# Patient Record
Sex: FEMALE | Race: WHITE | NOT HISPANIC OR LATINO | Employment: STUDENT | URBAN - METROPOLITAN AREA
[De-identification: names, ages, dates, MRNs, and addresses within clinical notes are randomized per-mention and may not be internally consistent; named-entity substitution may affect disease eponyms.]

---

## 2016-10-10 LAB
HBV SURFACE AG SERPL QL IA: NEGATIVE
HIV 1+2 AB+HIV1 P24 AG SERPL QL IA: NON REACTIVE
RPR: NON REACTIVE
RUBELLA IMMUNE STATUS: NORMAL

## 2016-11-15 LAB
HBV SURFACE AG SERPL QL IA: NEGATIVE
HIV 1+2 AB+HIV1 P24 AG SERPL QL IA: NORMAL
RPR: NORMAL
RUBELLA IMMUNE STATUS: NORMAL

## 2017-01-12 ENCOUNTER — HOSPITAL ENCOUNTER (EMERGENCY)
Facility: HOSPITAL | Age: 37
Discharge: HOME | End: 2017-01-12
Attending: EMERGENCY MEDICINE
Payer: COMMERCIAL

## 2017-01-12 VITALS
SYSTOLIC BLOOD PRESSURE: 141 MMHG | WEIGHT: 158 LBS | DIASTOLIC BLOOD PRESSURE: 87 MMHG | TEMPERATURE: 99 F | OXYGEN SATURATION: 98 % | HEIGHT: 67 IN | BODY MASS INDEX: 24.8 KG/M2 | HEART RATE: 89 BPM | RESPIRATION RATE: 13 BRPM

## 2017-01-12 DIAGNOSIS — O20.0 THREATENED ABORTION IN EARLY PREGNANCY: Primary | ICD-10-CM

## 2017-01-12 DIAGNOSIS — O20.0 THREATENED ABORTION: ICD-10-CM

## 2017-01-12 LAB
ALBUMIN SERPL BCP-MCNC: 3.5 G/DL
ALP SERPL-CCNC: 48 U/L
ALT SERPL W/O P-5'-P-CCNC: 23 U/L
ANION GAP SERPL CALC-SCNC: 10 MMOL/L
AST SERPL-CCNC: 15 U/L
BASOPHILS # BLD AUTO: 0.02 K/UL
BASOPHILS NFR BLD: 0.2 %
BILIRUB SERPL-MCNC: 0.8 MG/DL
BUN SERPL-MCNC: 9 MG/DL
CALCIUM SERPL-MCNC: 8.9 MG/DL
CHLORIDE SERPL-SCNC: 106 MMOL/L
CO2 SERPL-SCNC: 20 MMOL/L
CREAT SERPL-MCNC: 0.6 MG/DL
DIFFERENTIAL METHOD: ABNORMAL
EOSINOPHIL # BLD AUTO: 0.1 K/UL
EOSINOPHIL NFR BLD: 0.5 %
ERYTHROCYTE [DISTWIDTH] IN BLOOD BY AUTOMATED COUNT: 12.7 %
EST. GFR  (AFRICAN AMERICAN): >60 ML/MIN/1.73 M^2
EST. GFR  (NON AFRICAN AMERICAN): >60 ML/MIN/1.73 M^2
GLUCOSE SERPL-MCNC: 97 MG/DL
HCG INTACT+B SERPL-ACNC: NORMAL MIU/ML
HCT VFR BLD AUTO: 40.5 %
HGB BLD-MCNC: 13.7 G/DL
LYMPHOCYTES # BLD AUTO: 1.3 K/UL
LYMPHOCYTES NFR BLD: 12.6 %
MCH RBC QN AUTO: 29.7 PG
MCHC RBC AUTO-ENTMCNC: 33.8 %
MCV RBC AUTO: 88 FL
MONOCYTES # BLD AUTO: 0.5 K/UL
MONOCYTES NFR BLD: 4.6 %
NEUTROPHILS # BLD AUTO: 8.2 K/UL
NEUTROPHILS NFR BLD: 81.6 %
PLATELET # BLD AUTO: 269 K/UL
PMV BLD AUTO: 9.8 FL
POTASSIUM SERPL-SCNC: 3.6 MMOL/L
PROT SERPL-MCNC: 6.5 G/DL
RBC # BLD AUTO: 4.61 M/UL
SODIUM SERPL-SCNC: 136 MMOL/L
WBC # BLD AUTO: 9.99 K/UL

## 2017-01-12 PROCEDURE — 85025 COMPLETE CBC W/AUTO DIFF WBC: CPT

## 2017-01-12 PROCEDURE — 99284 EMERGENCY DEPT VISIT MOD MDM: CPT | Mod: 25

## 2017-01-12 PROCEDURE — 96361 HYDRATE IV INFUSION ADD-ON: CPT

## 2017-01-12 PROCEDURE — 96360 HYDRATION IV INFUSION INIT: CPT

## 2017-01-12 PROCEDURE — 80053 COMPREHEN METABOLIC PANEL: CPT

## 2017-01-12 PROCEDURE — 84702 CHORIONIC GONADOTROPIN TEST: CPT

## 2017-01-12 PROCEDURE — 25000003 PHARM REV CODE 250: Performed by: EMERGENCY MEDICINE

## 2017-01-12 RX ORDER — SODIUM CHLORIDE 9 MG/ML
125 INJECTION, SOLUTION INTRAVENOUS CONTINUOUS
Status: DISCONTINUED | OUTPATIENT
Start: 2017-01-12 | End: 2017-01-12 | Stop reason: HOSPADM

## 2017-01-12 RX ADMIN — SODIUM CHLORIDE 1000 ML: 0.9 INJECTION, SOLUTION INTRAVENOUS at 07:01

## 2017-01-12 NOTE — ED PROVIDER NOTES
"Encounter Date: 2017       History     Chief Complaint   Patient presents with    Vaginal Bleeding     vaginal bleeding x 30 minutes. pt is approximately 8 weeks preg. pt is under the care of .      Review of patient's allergies indicates:  No Known Allergies  Patient is a 36 y.o. female presenting with the following complaint: female genitourinary complaint. The history is provided by the patient.   Female  Problem   Primary symptoms include vaginal bleeding.    Primary symptoms include no pelvic pain, no fever.   This is a new problem. Illness onset: about 30 minutes PTA. The problem has been unchanged. The symptoms occur spontaneously. She is pregnant (8 weeks). Pertinent negatives include no light-headedness. She has tried nothing for the symptoms. Associated medical issues include miscarriage.   Patient is currently seeing a fertility doctor.   Past Medical History   Diagnosis Date    Anxiety     Arthritis 10/2014     right hip    Elevated BP     Miscarriage      No past medical history pertinent negatives.  Past Surgical History   Procedure Laterality Date     section      Dilation and curettage of uterus  2014     Family History   Problem Relation Age of Onset    Breast cancer Neg Hx     Colon cancer Neg Hx     Ovarian cancer Neg Hx      Social History   Substance Use Topics    Smoking status: Never Smoker    Smokeless tobacco: Never Used    Alcohol use No     Review of Systems   Constitutional: Negative for fever.   Gastrointestinal:        "morning sickness"   Genitourinary: Positive for vaginal bleeding. Negative for flank pain and pelvic pain.   Musculoskeletal: Negative for back pain.   Neurological: Negative for light-headedness.   All other systems reviewed and are negative.      Physical Exam   Initial Vitals   BP Pulse Resp Temp SpO2   17 0602 17 0602 17 0602 17 0607 17 0607   148/104 108 16 98.7 °F (37.1 °C) 98 %     Physical " Exam    Nursing note and vitals reviewed.  Constitutional: No distress.   HENT:   Head: Normocephalic and atraumatic.   Eyes: EOM are normal.   Neck: Normal range of motion. Neck supple.   Cardiovascular: Normal rate, regular rhythm and normal heart sounds.   Pulmonary/Chest: Breath sounds normal.   Abdominal: Soft. She exhibits no distension. There is no tenderness.   Genitourinary:   Genitourinary Comments: No vulvar or vaginal lesions.  Vaginal vault with scant amount of blood.  Cervix is open to fingertip.   Musculoskeletal: Normal range of motion. She exhibits no edema.   Neurological: She is alert and oriented to person, place, and time.   Skin: Skin is warm and dry.   Psychiatric: Her behavior is normal. Thought content normal.         ED Course   Procedures  Labs Reviewed   CBC W/ AUTO DIFFERENTIAL - Abnormal; Notable for the following:        Result Value    Gran # 8.2 (*)     Gran% 81.6 (*)     Lymph% 12.6 (*)     All other components within normal limits   COMPREHENSIVE METABOLIC PANEL - Abnormal; Notable for the following:     CO2 20 (*)     Alkaline Phosphatase 48 (*)     All other components within normal limits   HCG, QUANTITATIVE, PREGNANCY     Imaging Results         US OB Less Than 14 Wks with Transvaginal (xpd) (Final result) Result time:  01/12/17 09:05:42    Procedure changed from US OB Less Than 14 Wks First Gestation        Final result by Nemo Cervantes MD (01/12/17 09:05:42)    Impression:        1. Single viable intrauterine pregnancy with an estimated age of 8 weeks and 2 days.         Electronically signed by: NEMO CERVANTES MD  Date:     01/12/17  Time:    09:05     Narrative:    US OB less than 14 weeks with transvaginal    History: Positive urine pregnancy test, last menstrual period 11/19/16.Vaginal bleeding    Comparison: None    Results: Transabdominal followed by a transvaginal ultrasound of the pelvis obtained.    There is a single viable intrauterine pregnancy.  A yolk  sac is also identified. The crown-rump length is 1.7cm.  It corresponds to an estimated gestational age of  8 weeks and 2 days .  The fetal heart rate is 164 bpm.  The right ovary measures 4.3 x 2.3 x 2.8 cm .Possible corpus luteal cyst measuring 3.0 x 2.5 cm.  The left ovary measures 3.2 x 1.9 x 2.6 cm .Corpus luteal cyst 1.8 x 1.9 cm.  There is arterial and venous flow within both ovaries.  No free fluid.                      Medical Decision Making:   Clinical Tests:   Lab Tests: Ordered and Reviewed  The following lab test(s) were unremarkable: CBC and CMP  Radiological Study: Ordered and Reviewed  ED Management:  36-year-old female with a threatened  at 8 weeks.  Ultrasound shows a viable IUP.  Case was discussed with Dr. [place], who agrees patient may be discharged home.  She is been advised to return if patient saturates a pad every 30 minutes to 1 hour and this continues struck the day.  She'll also return for any weakness, lightheadedness or shortness of breath.  She will follow-up with Dr. Alves as soon as able for recheck.                   ED Course     Clinical Impression:   The primary encounter diagnosis was Threatened  in early pregnancy. A diagnosis of Threatened  was also pertinent to this visit.          Rhett Coffman MD  [id number]

## 2017-01-12 NOTE — DISCHARGE INSTRUCTIONS
Possible Miscarriage (Threatened )  You may be having a miscarriage.  Common signs of a miscarriage are pain and bleeding. A small amount of bleeding can be normal during the first 3 months of pregnancy. Often the pain and bleeding stop, and you have a normal pregnancy and baby. But heavy bleeding or severe cramping can be an early sign of miscarriage. A miscarriage means an unexpected loss of your pregnancy.  At this time, we dont know whether you will have a miscarriage, or if things will clear up and your pregnancy will continue normally. We understand that this is emotionally difficult. There is little we can say to change the way you feel. But understand that miscarriages are common.  About 1 or 2 out of every 10 pregnancies end this way. Some end even before you know you are pregnant. This happens for a number of reasons, and usually we never figure out why. Its important you know that it is not your fault. It didnt happen because you did anything wrong.  Having sex or exercising does not cause a miscarriage. These activities are usually safe unless you have pain or bleeding or your doctor tells you to stop. Even minor falls wont cause a miscarriage. Miscarriages happen because things were not developing as they were supposed to. No medicine can prevent a miscarriage.  Again, understand that things are uncertain right now. You may still have some bleeding. This may be light spotting or like a period, and you may pass some tissue. You may have some cramping. This is why follow-up care is important.  Home care  To improve the chance of keeping your pregnancy, you should take these steps:  · Rest in bed until the pain and bleeding stop.  · Dont have sex until your health care provider says its OK.  · Use sanitary napkins instead of tampons.  · Dont douche.  · Dont take aspirin, ibuprofen, or naproxen.  · Dont have alcoholic or caffeinated beverage or smoke.  Follow-up care  Make an  appointment with your doctor within the next week, or as directed by our staff.  Note: If you had an ultrasound, a radiologist will review it. You will be told of any new findings that may affect your care.  Call 911  Call 911 if you have:  · Severe pain and very heavy bleeding  · Severe lightheadedness, passing out, or fainting  · Rapid heart rate  · Difficulty breathing  · Confusion or difficulty waking up  When to seek medical advice  Call your health care provider right away if any of these occur:  · Vaginal bleeding or pain that lasts for more than 3 days  · Heavy bleeding. This means soaking 1 new pad an hour over 3 hours.  · Fever of 100.4°F (38°C) or higher, or as directed by your health care provider  · Pain in your lower belly (abdomen) that gets worse  · Weakness or dizziness  · Passage of anything that resembles tissue. This would be pink or grayish membrane or solid material. Save the tissue in a clean container and bring it to your provider.  © 1096-6707 [employer]. [address] [place] [address], [place] [address]. All rights reserved. This information is not intended as a substitute for professional medical care. Always follow your healthcare professional's instructions.

## 2017-01-12 NOTE — ED NOTES
Pt felt heavy vaginal bleeding so got up to sit on the bedside commode. Passed 2 large blood clots. Clots placed in specimen cup. Dr. Coffman informed

## 2017-01-12 NOTE — ED AVS SNAPSHOT
[facility]  [address]  [place] [address]               Crystal Hurley   2017  6:41 AM   ED    Description:  Female : 1980   Department:  [facility]           Your Care was Coordinated By:     Provider Role From To    Rhett Coffman MD Attending Provider 17 0710 --      Reason for Visit     Vaginal Bleeding           Diagnoses this Visit        Comments    Threatened  in early pregnancy    -  Primary     Threatened            ED Disposition     ED Disposition Condition Comment    Discharge             To Do List           Follow-up Information     Schedule an appointment as soon as possible for a visit with Tiffany Alves MD.    Specialties:  Obstetrics, Obstetrics and Gynecology    Contact information:    [address] [place] [address]  [phone]        [facility]     [facility] Nurse Care Line -  Assistance  Registered nurses in the [facility] provide clinical advisement, health education, appointment booking, and other advisory services.  Call for this free service at [phone].             Medications           Message regarding Medications     Verify the changes and/or additions to your medication regime listed below are the same as discussed with your clinician today.  If any of these changes or additions are incorrect, please notify your healthcare provider.        These medications were administered today        Dose Freq    sodium chloride 0.9% bolus 1,000 mL 1,000 mL ED 1 Time    Sig: Inject 1,000 mLs into the vein ED 1 Time.    Class: Normal    Route: Intravenous    0.9%  NaCl infusion 125 mL/hr Continuous    Sig: Inject 125 mL/hr into the vein continuous.    Class: Normal    Route: Intravenous           Verify that the below list of medications is an accurate representation of the medications you are currently taking.  If none reported, the list may be  "blank. If incorrect, please contact your healthcare provider. Carry this list with you in case of emergency.           Current Medications     0.9%  NaCl infusion Inject 125 mL/hr into the vein continuous.    folic acid (FOLVITE) 1 MG tablet Take 1 tablet (1 mg total) by mouth once daily.    ibuprofen (ADVIL,MOTRIN) 100 mg/5 mL suspension Take by mouth every 6 (six) hours as needed for Temperature greater than.    prenatal vit no.91-iron-FA-dha (PRENATAL + DHA) 28 mg iron- 975 mcg-200 mg Cmpk Take by mouth.    ranitidine (ZANTAC) 150 MG capsule Take 150 mg by mouth 2 (two) times daily as needed for Heartburn.           Clinical Reference Information           Your Vitals Were     BP Pulse Temp Resp Height Weight    146/84 84 98.7 °F (37.1 °C) (Oral) 13 5' 7" (1.702 m) 71.7 kg (158 lb)    SpO2 BMI             98% 24.75 kg/m2         Allergies as of 2017     No Known Allergies      Immunizations Administered on Date of Encounter - 2017     None      ED Micro, Lab, POCT     Start Ordered       Status Ordering Provider    17 0619  CBC W/ AUTO DIFFERENTIAL  STAT      Final result     17 0620 17 0619  Comp. Metabolic Panel  STAT      Final result     17 0620 17 0619  hCG, quantitative  STAT      Final result     17 0620 17 0619    Once,   Status:  Canceled      Canceled       ED Imaging Orders     Start Ordered       Status Ordering Provider    17 0809 17 0620  US OB Less Than 14 Wks with Transvaginal (xpd)  1 time imaging      Final result     17 0621 17 0620    1 time imaging,   Status:  Canceled      Canceled         Discharge Instructions         Possible Miscarriage (Threatened )  You may be having a miscarriage.  Common signs of a miscarriage are pain and bleeding. A small amount of bleeding can be normal during the first 3 months of pregnancy. Often the pain and bleeding stop, and you have a normal pregnancy and " baby. But heavy bleeding or severe cramping can be an early sign of miscarriage. A miscarriage means an unexpected loss of your pregnancy.  At this time, we dont know whether you will have a miscarriage, or if things will clear up and your pregnancy will continue normally. We understand that this is emotionally difficult. There is little we can say to change the way you feel. But understand that miscarriages are common.  About 1 or 2 out of every 10 pregnancies end this way. Some end even before you know you are pregnant. This happens for a number of reasons, and usually we never figure out why. Its important you know that it is not your fault. It didnt happen because you did anything wrong.  Having sex or exercising does not cause a miscarriage. These activities are usually safe unless you have pain or bleeding or your doctor tells you to stop. Even minor falls wont cause a miscarriage. Miscarriages happen because things were not developing as they were supposed to. No medicine can prevent a miscarriage.  Again, understand that things are uncertain right now. You may still have some bleeding. This may be light spotting or like a period, and you may pass some tissue. You may have some cramping. This is why follow-up care is important.  Home care  To improve the chance of keeping your pregnancy, you should take these steps:  · Rest in bed until the pain and bleeding stop.  · Dont have sex until your health care provider says its OK.  · Use sanitary napkins instead of tampons.  · Dont douche.  · Dont take aspirin, ibuprofen, or naproxen.  · Dont have alcoholic or caffeinated beverage or smoke.  Follow-up care  Make an appointment with your doctor within the next week, or as directed by our staff.  Note: If you had an ultrasound, a radiologist will review it. You will be told of any new findings that may affect your care.  Call 911  Call 911 if you have:  · Severe pain and very heavy bleeding  · Severe  lightheadedness, passing out, or fainting  · Rapid heart rate  · Difficulty breathing  · Confusion or difficulty waking up  When to seek medical advice  Call your health care provider right away if any of these occur:  · Vaginal bleeding or pain that lasts for more than 3 days  · Heavy bleeding. This means soaking 1 new pad an hour over 3 hours.  · Fever of 100.4°F (38°C) or higher, or as directed by your health care provider  · Pain in your lower belly (abdomen) that gets worse  · Weakness or dizziness  · Passage of anything that resembles tissue. This would be pink or grayish membrane or solid material. Save the tissue in a clean container and bring it to your provider.  © 4791-2907 [employer]. [address] [place] [address], [place] [address]. All rights reserved. This information is not intended as a substitute for professional medical care. Always follow your healthcare professional's instructions.           [facility] complies with applicable Federal civil rights laws and does not discriminate on the basis of race, color, national origin, age, disability, or sex.        Language Assistance Services     ATTENTION: Language assistance services are available, free of charge. Please call [phone].      ATENCIÓN: Si habla español, tiene a matias disposición servicios gratuitos de asistencia lingüística. Llame al [phone].     DANIELLE Ý: N?u b?n nói Ti?ng Vi?t, có các d?ch v? h? tr? ngôn ng? mi?n phí dành cho b?n. G?i s? [phone].

## 2017-01-12 NOTE — ED NOTES
Pt c/o heavy vaginal bleeding this morning. States she is 8 weeks pregnant, and had a normal ultrasound on Monday with her physician. Denies any recent abdominal pain. Reports history of 2 miscarriages in the past few years. Pt denies feeling weak or lightheaded.

## 2017-01-21 ENCOUNTER — HOSPITAL ENCOUNTER (EMERGENCY)
Facility: HOSPITAL | Age: 37
Discharge: HOME | End: 2017-01-21
Attending: EMERGENCY MEDICINE
Payer: COMMERCIAL

## 2017-01-21 VITALS
SYSTOLIC BLOOD PRESSURE: 150 MMHG | HEIGHT: 67 IN | TEMPERATURE: 98 F | RESPIRATION RATE: 18 BRPM | WEIGHT: 158 LBS | OXYGEN SATURATION: 98 % | BODY MASS INDEX: 24.8 KG/M2 | DIASTOLIC BLOOD PRESSURE: 69 MMHG | HEART RATE: 89 BPM

## 2017-01-21 DIAGNOSIS — O20.0 THREATENED MISCARRIAGE: Primary | ICD-10-CM

## 2017-01-21 LAB
ABO + RH BLD: NORMAL
ALBUMIN SERPL BCP-MCNC: 3.1 G/DL
ALP SERPL-CCNC: 45 U/L
ALT SERPL W/O P-5'-P-CCNC: 23 U/L
ANION GAP SERPL CALC-SCNC: 7 MMOL/L
AST SERPL-CCNC: 13 U/L
BACTERIA #/AREA URNS HPF: ABNORMAL /HPF
BASOPHILS # BLD AUTO: 0.02 K/UL
BASOPHILS NFR BLD: 0.3 %
BILIRUB SERPL-MCNC: 0.8 MG/DL
BILIRUB UR QL STRIP: NEGATIVE
BUN SERPL-MCNC: 7 MG/DL
CALCIUM SERPL-MCNC: 8 MG/DL
CHLORIDE SERPL-SCNC: 109 MMOL/L
CLARITY UR: ABNORMAL
CO2 SERPL-SCNC: 19 MMOL/L
COLOR UR: ABNORMAL
CREAT SERPL-MCNC: 0.6 MG/DL
DIFFERENTIAL METHOD: ABNORMAL
EOSINOPHIL # BLD AUTO: 0.1 K/UL
EOSINOPHIL NFR BLD: 1.2 %
ERYTHROCYTE [DISTWIDTH] IN BLOOD BY AUTOMATED COUNT: 12.8 %
EST. GFR  (AFRICAN AMERICAN): >60 ML/MIN/1.73 M^2
EST. GFR  (NON AFRICAN AMERICAN): >60 ML/MIN/1.73 M^2
GLUCOSE SERPL-MCNC: 96 MG/DL
GLUCOSE UR QL STRIP: NEGATIVE
HCG INTACT+B SERPL-ACNC: NORMAL MIU/ML
HCT VFR BLD AUTO: 38.2 %
HGB BLD-MCNC: 13 G/DL
HGB UR QL STRIP: ABNORMAL
HYALINE CASTS #/AREA URNS LPF: 0 /LPF
KETONES UR QL STRIP: NEGATIVE
LEUKOCYTE ESTERASE UR QL STRIP: ABNORMAL
LYMPHOCYTES # BLD AUTO: 1.2 K/UL
LYMPHOCYTES NFR BLD: 15.4 %
MCH RBC QN AUTO: 29.8 PG
MCHC RBC AUTO-ENTMCNC: 34 %
MCV RBC AUTO: 88 FL
MICROSCOPIC COMMENT: ABNORMAL
MONOCYTES # BLD AUTO: 0.5 K/UL
MONOCYTES NFR BLD: 5.8 %
NEUTROPHILS # BLD AUTO: 6 K/UL
NEUTROPHILS NFR BLD: 76.8 %
NITRITE UR QL STRIP: NEGATIVE
PH UR STRIP: >8 [PH] (ref 5–8)
PLATELET # BLD AUTO: 235 K/UL
PMV BLD AUTO: 10 FL
POTASSIUM SERPL-SCNC: 3.5 MMOL/L
PROT SERPL-MCNC: 5.8 G/DL
PROT UR QL STRIP: ABNORMAL
RBC # BLD AUTO: 4.36 M/UL
RBC #/AREA URNS HPF: >100 /HPF (ref 0–4)
SODIUM SERPL-SCNC: 135 MMOL/L
SP GR UR STRIP: 1.01 (ref 1–1.03)
URN SPEC COLLECT METH UR: ABNORMAL
UROBILINOGEN UR STRIP-ACNC: NEGATIVE EU/DL
WBC # BLD AUTO: 7.75 K/UL
WBC #/AREA URNS HPF: 5 /HPF (ref 0–5)

## 2017-01-21 PROCEDURE — 80053 COMPREHEN METABOLIC PANEL: CPT

## 2017-01-21 PROCEDURE — 84702 CHORIONIC GONADOTROPIN TEST: CPT

## 2017-01-21 PROCEDURE — 86901 BLOOD TYPING SEROLOGIC RH(D): CPT

## 2017-01-21 PROCEDURE — 86900 BLOOD TYPING SEROLOGIC ABO: CPT

## 2017-01-21 PROCEDURE — 85025 COMPLETE CBC W/AUTO DIFF WBC: CPT

## 2017-01-21 PROCEDURE — 81000 URINALYSIS NONAUTO W/SCOPE: CPT

## 2017-01-21 PROCEDURE — 99285 EMERGENCY DEPT VISIT HI MDM: CPT

## 2017-01-21 NOTE — ED AVS SNAPSHOT
[facility]  [address]  [place] [address]               Crystal Hurley   2017  6:30 AM   ED    Description:  Female : 1980   Department:  [facility]           Your Care was Coordinated By:     Provider Role From To    Khris Macias MD Attending Provider 17 0715 17 1210      Reason for Visit     Vaginal Bleeding           Diagnoses this Visit        Comments    Threatened miscarriage    -  Primary       ED Disposition     ED Disposition Condition Comment    Discharge             To Do List           Follow-up Information     Follow up with Dusty Navarro MD.    Specialty:  Family Medicine    Contact information:    [address]  [address]  [place] [address]  [phone]        [facility]     [facility] Care [facility] -  Assistance  Registered nurses in the [facility] provide clinical advisement, health education, appointment booking, and other advisory services.  Call for this free service at [phone].             Medications           Message regarding Medications     Verify the changes and/or additions to your medication regime listed below are the same as discussed with your clinician today.  If any of these changes or additions are incorrect, please notify your healthcare provider.             Verify that the below list of medications is an accurate representation of the medications you are currently taking.  If none reported, the list may be blank. If incorrect, please contact your healthcare provider. Carry this list with you in case of emergency.           Current Medications     folic acid (FOLVITE) 1 MG tablet Take 1 tablet (1 mg total) by mouth once daily.    ibuprofen (ADVIL,MOTRIN) 100 mg/5 mL suspension Take by mouth every 6 (six) hours as needed for Temperature greater than.    prenatal vit no.91-iron-FA-dha (PRENATAL + DHA) 28 mg iron- 975 mcg-200 mg Cmpk Take by mouth.  "   ranitidine (ZANTAC) 150 MG capsule Take 150 mg by mouth 2 (two) times daily as needed for Heartburn.           Clinical Reference Information           Your Vitals Were     BP Pulse Temp Resp Height Weight    150/69 89 98.2 °F (36.8 °C) (Oral) 18 5' 7" (1.702 m) 71.7 kg (158 lb)    SpO2 BMI             98% 24.75 kg/m2         Allergies as of 2017     No Known Allergies      Immunizations Administered on Date of Encounter - 2017     None      ED Micro, Lab, POCT     Start Ordered       Status Ordering Provider    17 0641 17 0641  CBC W/ AUTO DIFFERENTIAL  STAT      Final result     17 0641 17 0641  Comp. Metabolic Panel  STAT      Final result     17 0641 17 0641  hCG, quantitative  STAT      Final result     17 0641 17 0641  POCT urine pregnancy  Once      Acknowledged     17 0641 17 0641  Urinalysis  STAT      Final result     17 0641 17 0641  Urinalysis Microscopic  Once      Final result       ED Imaging Orders     Start Ordered       Status Ordering Provider    17 0856 17 0855  US OB Less Than 14 Wks with Transvag(xpd  1 time imaging      Final result         Discharge Instructions         Please follow-up with Dr. Pak or Dr. Alves for further evaluation and management of your pregnancy.  Refer to the additional material provided for further information.    Possible Miscarriage (Threatened )  You may be having a miscarriage.  Common signs of a miscarriage are pain and bleeding. A small amount of bleeding can be normal during the first 3 months of pregnancy. Often the pain and bleeding stop, and you have a normal pregnancy and baby. But heavy bleeding or severe cramping can be an early sign of miscarriage. A miscarriage means an unexpected loss of your pregnancy.  At this time, we dont know whether you will have a miscarriage, or if things will clear up and your pregnancy will continue normally. We " understand that this is emotionally difficult. There is little we can say to change the way you feel. But understand that miscarriages are common.  About 1 or 2 out of every 10 pregnancies end this way. Some end even before you know you are pregnant. This happens for a number of reasons, and usually we never figure out why. Its important you know that it is not your fault. It didnt happen because you did anything wrong.  Having sex or exercising does not cause a miscarriage. These activities are usually safe unless you have pain or bleeding or your doctor tells you to stop. Even minor falls wont cause a miscarriage. Miscarriages happen because things were not developing as they were supposed to. No medicine can prevent a miscarriage.  Again, understand that things are uncertain right now. You may still have some bleeding. This may be light spotting or like a period, and you may pass some tissue. You may have some cramping. This is why follow-up care is important.  Home care  To improve the chance of keeping your pregnancy, you should take these steps:  · Rest in bed until the pain and bleeding stop.  · Dont have sex until your health care provider says its OK.  · Use sanitary napkins instead of tampons.  · Dont douche.  · Dont take aspirin, ibuprofen, or naproxen.  · Dont have alcoholic or caffeinated beverage or smoke.  Follow-up care  Make an appointment with your doctor within the next week, or as directed by our staff.  Note: If you had an ultrasound, a radiologist will review it. You will be told of any new findings that may affect your care.  Call 911  Call 911 if you have:  · Severe pain and very heavy bleeding  · Severe lightheadedness, passing out, or fainting  · Rapid heart rate  · Difficulty breathing  · Confusion or difficulty waking up  When to seek medical advice  Call your health care provider right away if any of these occur:  · Vaginal bleeding or pain that lasts for more than 3  days  · Heavy bleeding. This means soaking 1 new pad an hour over 3 hours.  · Fever of 100.4°F (38°C) or higher, or as directed by your health care provider  · Pain in your lower belly (abdomen) that gets worse  · Weakness or dizziness  · Passage of anything that resembles tissue. This would be pink or grayish membrane or solid material. Save the tissue in a clean container and bring it to your provider.  © 3720-1596 [employer]. [address] [place] [address], [place] [address]. All rights reserved. This information is not intended as a substitute for professional medical care. Always follow your healthcare professional's instructions.           [facility] complies with applicable Federal civil rights laws and does not discriminate on the basis of race, color, national origin, age, disability, or sex.        Language Assistance Services     ATTENTION: Language assistance services are available, free of charge. Please call [phone].      ATENCIÓN: Si habla gem, tiene a matias disposición servicios gratuitos de asistencia lingüística. Llame al [phone].     CHÚ Ý: N?u b?n nói Ti?ng Vi?t, có các d?ch v? h? tr? ngôn ng? mi?n phí dành cho b?n. G?i s? [phone].

## 2017-01-21 NOTE — ED NOTES
Pt states she is 9 weeks pregnant.  States she just stood up and started having vaginal bleeding. States she was here last week for same complaint.

## 2017-01-21 NOTE — ED NOTES
Patient identifiers for Crystal Hurley checked and correct.  LOC: The patient is awake, alert and aware of environment with an appropriate affect, the patient is oriented x 3 and speaking appropriately.  APPEARANCE: Anxious. Tearful.  Patient uncomfortable.  SKIN: The skin is warm and dry, patient has normal skin turgor and moist mucus membranes, skin intact, no breakdown or brusing noted.  MUSKULOSKELETAL: Patient moving all extremities well, no obvious swelling or deformities noted.  RESPIRATORY: Airway is open and patent, respirations are spontaneous, patient has a normal effort and rate.  ABDOMEN: Soft and  tender to palpation, no distention noted.

## 2017-01-21 NOTE — ED PROVIDER NOTES
Encounter Date: 2017       History     Chief Complaint   Patient presents with    Vaginal Bleeding     intermittent vag bleeding x 1 week. pt was seen here last week for same complaint     Review of patient's allergies indicates:  No Known Allergies  HPI Comments: CHIEF COMPLAINT: Vaginal bleeding    HISTORY OF PRESENT ILLNESS: This is a 36-year-old female who presents to the emergency Department today with vaginal bleeding.  She is 10 weeks pregnant.  She is seen by Dr. Pak at the fertility clinic and Dr. Alves with OB/GYN.  She started with some vaginal spotting last week.  She had an ultrasound at that time which showed good heart rate.  Yesterday and today she started to develop a little bit more bleeding.  She is having no pain.  No vaginal discharge.  No lightheadedness or dizziness.  No palpitations.      REVIEW OF SYSTEMS:  Constitutional: No fever, no chills.  Eyes: No discharge. No pain.  HENT: No nasal drainage. No ear ache. No sore throat.  Cardiovascular: No chest pain, no palpitations.  Respiratory: No cough, no shortness of breath.  Gastrointestinal: No vomiting.  No diarrhea.  Genitourinary: No hematuria, dysuria, urgency.  Musculoskeletal: No back pain.  Skin: No rashes, no lesions.  Neurological: No headache, no focal weakness.    ALLERGIES reviewed  Family history reviewed  Home medications reviewed  Problem list reviewed        The history is provided by the patient.     Past Medical History   Diagnosis Date    Anxiety     Arthritis 10/2014     right hip    Elevated BP     Miscarriage      No past medical history pertinent negatives.  Past Surgical History   Procedure Laterality Date     section      Dilation and curettage of uterus  2014     Family History   Problem Relation Age of Onset    Breast cancer Neg Hx     Colon cancer Neg Hx     Ovarian cancer Neg Hx      Social History   Substance Use Topics    Smoking status: Never Smoker    Smokeless tobacco: Never  Used    Alcohol use No     Review of Systems   All other systems reviewed and are negative.      Physical Exam   Initial Vitals   BP Pulse Resp Temp SpO2   01/21/17 0631 01/21/17 0631 01/21/17 0631 01/21/17 0631 01/21/17 0631   155/92 93 18 98.2 °F (36.8 °C) 100 %     Physical Exam    Nursing note and vitals reviewed.  Constitutional: She appears well-developed and well-nourished.   HENT:   Head: Normocephalic and atraumatic.   Nose: Nose normal.   Mouth/Throat: Oropharynx is clear and moist.   Eyes: Conjunctivae and EOM are normal. Pupils are equal, round, and reactive to light. No scleral icterus.   Neck: Normal range of motion. Neck supple. No JVD present.   Cardiovascular: Normal rate, regular rhythm, normal heart sounds and intact distal pulses. Exam reveals no gallop and no friction rub.    No murmur heard.  Pulmonary/Chest: Breath sounds normal. No stridor. No respiratory distress. She has no wheezes. She exhibits no tenderness.   Abdominal: Soft. Bowel sounds are normal. She exhibits no distension and no mass. There is no tenderness. There is no rebound and no guarding.   Musculoskeletal: Normal range of motion. She exhibits no edema or tenderness.   Neurological: She is alert and oriented to person, place, and time. She has normal strength. No cranial nerve deficit.   Skin: Skin is warm and dry. No rash noted. No pallor.   Psychiatric: She has a normal mood and affect. Thought content normal.         ED Course   Procedures  Labs Reviewed   CBC W/ AUTO DIFFERENTIAL - Abnormal; Notable for the following:        Result Value    Gran% 76.8 (*)     Lymph% 15.4 (*)     All other components within normal limits   COMPREHENSIVE METABOLIC PANEL - Abnormal; Notable for the following:     Sodium 135 (*)     CO2 19 (*)     Calcium 8.0 (*)     Total Protein 5.8 (*)     Albumin 3.1 (*)     Alkaline Phosphatase 45 (*)     Anion Gap 7 (*)     All other components within normal limits   URINALYSIS - Abnormal; Notable for  the following:     Color, UA Red (*)     Appearance, UA Cloudy (*)     pH, UA >8.0 (*)     Protein, UA 2+ (*)     Occult Blood UA 3+ (*)     Leukocytes, UA Trace (*)     All other components within normal limits   URINALYSIS MICROSCOPIC - Abnormal; Notable for the following:     RBC, UA >100 (*)     Bacteria, UA Few (*)     All other components within normal limits   HCG, QUANTITATIVE, PREGNANCY   POCT URINE PREGNANCY   GROUP & RH             Medical Decision Making:   Differential Diagnosis:   Ectopic pregnancy, placental abruption, placenta previa, ruptured ovarian cyst, ovarian torsion, infection, foreign body, coagulation disorder, neoplasm, menstruation, drug effect.  Clinical Tests:   Lab Tests: Ordered and Reviewed  Radiological Study: Ordered and Reviewed  ED Management:  This is a 36-year-old who presents emergency Department today with vaginal bleeding.  She has a previous ultrasound which showed an appropriate heart rate.  Given her increased bleeding and have obtained a ultrasound today.  We do see appropriate heartbeat.  There is a small subchorionic hemorrhage seen today.  She will need to follow up with her OB/GYN for continued management of this threatened miscarriage.  I have discussed this at length with the patient she understands and will comply. After taking into careful account the historical factors and physical exam findings of the patient's presentation today, in conjunction with the empirical and objective data obtained on ED workup, no acute emergent medical condition has been identified. The patient appears to be low risk for an emergent medical condition and I feel it is safe and appropriate at this time for the patient to be discharged to follow-up as detailed in their discharge instructions for reevaluation and possible continued outpatient workup and management. I have discussed the specifics of the workup with the patient and the patient has verbalized understanding of the details of  the workup, the diagnosis, the treatment plan, and the need for outpatient follow-up. In addition, I have advised the patient that they can return to the ED and/or activate EMS at any time with worsening of their symptoms, change of their symptoms, or with any other medical complaint. The patient remained comfortable and stable during their visit in the ED.  Discharge and follow-up instructions discussed with the patient who expressed understanding and willingness to comply with my recommendations.     This medical record was prepared using voice dictation software. There may be phonetic errors.                    ED Course    I have discussed the results the patient's workup with the patient.    Past medical records reviewed    Clinical Impression:   The encounter diagnosis was Threatened miscarriage.    Disposition:   Disposition: Discharged  Condition: Stable       Khris Macias MD  01/21/17 [id number]

## 2017-01-21 NOTE — ED NOTES
Dr. Macias at bedside for re-eval of pt. Pt updated on plan of care and verbalized understanding of further instructions.

## 2017-01-21 NOTE — ED NOTES
Assisted pt to bedside commode. Pt stated she felt weak, transferred with assistance. Tolerated well. NAD noted. Will cont to monitor.

## 2017-01-21 NOTE — ED NOTES
Bedside report with PAULINE Garcia. Care assumed. Pt lying awake in bed, just returned from ultrasound. No acute distress noted.  at bedside. Pt has no other complaints at this time. Side rails up x 2, call light within reach. Will continue to monitor.

## 2017-01-21 NOTE — DISCHARGE INSTRUCTIONS
Please follow-up with Dr. Pak or Dr. Alves for further evaluation and management of your pregnancy.  Refer to the additional material provided for further information.    Possible Miscarriage (Threatened )  You may be having a miscarriage.  Common signs of a miscarriage are pain and bleeding. A small amount of bleeding can be normal during the first 3 months of pregnancy. Often the pain and bleeding stop, and you have a normal pregnancy and baby. But heavy bleeding or severe cramping can be an early sign of miscarriage. A miscarriage means an unexpected loss of your pregnancy.  At this time, we dont know whether you will have a miscarriage, or if things will clear up and your pregnancy will continue normally. We understand that this is emotionally difficult. There is little we can say to change the way you feel. But understand that miscarriages are common.  About 1 or 2 out of every 10 pregnancies end this way. Some end even before you know you are pregnant. This happens for a number of reasons, and usually we never figure out why. Its important you know that it is not your fault. It didnt happen because you did anything wrong.  Having sex or exercising does not cause a miscarriage. These activities are usually safe unless you have pain or bleeding or your doctor tells you to stop. Even minor falls wont cause a miscarriage. Miscarriages happen because things were not developing as they were supposed to. No medicine can prevent a miscarriage.  Again, understand that things are uncertain right now. You may still have some bleeding. This may be light spotting or like a period, and you may pass some tissue. You may have some cramping. This is why follow-up care is important.  Home care  To improve the chance of keeping your pregnancy, you should take these steps:  · Rest in bed until the pain and bleeding stop.  · Dont have sex until your health care provider says its OK.  · Use sanitary napkins  instead of tampons.  · Dont douche.  · Dont take aspirin, ibuprofen, or naproxen.  · Dont have alcoholic or caffeinated beverage or smoke.  Follow-up care  Make an appointment with your doctor within the next week, or as directed by our staff.  Note: If you had an ultrasound, a radiologist will review it. You will be told of any new findings that may affect your care.  Call 911  Call 911 if you have:  · Severe pain and very heavy bleeding  · Severe lightheadedness, passing out, or fainting  · Rapid heart rate  · Difficulty breathing  · Confusion or difficulty waking up  When to seek medical advice  Call your health care provider right away if any of these occur:  · Vaginal bleeding or pain that lasts for more than 3 days  · Heavy bleeding. This means soaking 1 new pad an hour over 3 hours.  · Fever of 100.4°F (38°C) or higher, or as directed by your health care provider  · Pain in your lower belly (abdomen) that gets worse  · Weakness or dizziness  · Passage of anything that resembles tissue. This would be pink or grayish membrane or solid material. Save the tissue in a clean container and bring it to your provider.  © 4125-7443 [employer]. [address] [place] [address], [place] [address]. All rights reserved. This information is not intended as a substitute for professional medical care. Always follow your healthcare professional's instructions.

## 2017-01-23 ENCOUNTER — TELEPHONE (OUTPATIENT)
Dept: OBSTETRICS AND GYNECOLOGY | Facility: CLINIC | Age: 37
End: 2017-01-23

## 2017-01-23 DIAGNOSIS — O20.9: Primary | ICD-10-CM

## 2017-01-23 NOTE — TELEPHONE ENCOUNTER
Patient was seen in the ED on 1/12/17 and 1/21/17 for bleeding.  Ultrasound done in ED showed a 10w 0d IUP.  There is a subchorionic hemorrhage w/ a small blood clot that measures 1.5cm.    Patient states she is being released from  but she wants the patient to be seen two weeks from today.  Patient states when she was seen by  this morning she did another ultrasound and the blood clot was much smaller and moved away from the baby.   Patient states  is suppose to be sending her records to  and was not sure if  is recommending an ultrasound with her visit when she sees  in two weeks.   Notified I will discuss with  and get back with her on scheduling.  Patient verbalized understanding.    Please advise.

## 2017-01-23 NOTE — TELEPHONE ENCOUNTER
----- Message from Andreia Quiroga sent at 1/23/2017 10:56 AM CST -----  Contact: Self [phone]  Patient is calling to schedule a 2 week follow up and the next available is until 2/16/17 and she would like something sooner. Please advice

## 2017-02-06 ENCOUNTER — ROUTINE PRENATAL (OUTPATIENT)
Dept: OBSTETRICS AND GYNECOLOGY | Facility: CLINIC | Age: 37
End: 2017-02-06
Payer: COMMERCIAL

## 2017-02-06 ENCOUNTER — OFFICE VISIT (OUTPATIENT)
Dept: OBSTETRICS AND GYNECOLOGY | Facility: CLINIC | Age: 37
End: 2017-02-06
Payer: COMMERCIAL

## 2017-02-06 VITALS
BODY MASS INDEX: 24.62 KG/M2 | WEIGHT: 157.19 LBS | SYSTOLIC BLOOD PRESSURE: 144 MMHG | DIASTOLIC BLOOD PRESSURE: 86 MMHG

## 2017-02-06 DIAGNOSIS — O16.1 HYPERTENSION AFFECTING PREGNANCY IN FIRST TRIMESTER: ICD-10-CM

## 2017-02-06 DIAGNOSIS — Z34.81 ENCOUNTER FOR SUPERVISION OF OTHER NORMAL PREGNANCY IN FIRST TRIMESTER: ICD-10-CM

## 2017-02-06 DIAGNOSIS — Z98.891 HISTORY OF C-SECTION: ICD-10-CM

## 2017-02-06 DIAGNOSIS — Z87.51 HISTORY OF PRETERM DELIVERY: ICD-10-CM

## 2017-02-06 DIAGNOSIS — O20.9: ICD-10-CM

## 2017-02-06 PROBLEM — Z34.91 ENCOUNTER FOR SUPERVISION OF NORMAL PREGNANCY IN FIRST TRIMESTER: Status: ACTIVE | Noted: 2017-02-06

## 2017-02-06 PROCEDURE — 87480 CANDIDA DNA DIR PROBE: CPT

## 2017-02-06 PROCEDURE — 76801 OB US < 14 WKS SINGLE FETUS: CPT | Mod: S$GLB,,, | Performed by: OBSTETRICS & GYNECOLOGY

## 2017-02-06 PROCEDURE — 0500F INITIAL PRENATAL CARE VISIT: CPT | Mod: S$GLB,,, | Performed by: OBSTETRICS & GYNECOLOGY

## 2017-02-06 PROCEDURE — 87086 URINE CULTURE/COLONY COUNT: CPT

## 2017-02-06 PROCEDURE — 99999 PR PBB SHADOW E&M-EST. PATIENT-LVL II: CPT | Mod: PBBFAC,,, | Performed by: OBSTETRICS & GYNECOLOGY

## 2017-02-06 RX ORDER — DOXYLAMINE SUCCINATE AND PYRIDOXINE HYDROCHLORIDE 10; 10 MG/1; MG/1
TABLET, DELAYED RELEASE ORAL
COMMUNITY
Start: 2017-01-31 | End: 2017-03-06 | Stop reason: SDUPTHER

## 2017-02-06 NOTE — PROGRESS NOTES
OBSTETRIC HISTORY:   OB History      Para Term  AB TAB SAB Ectopic Multiple Living    4 1 0 1 2  2   1         COMPREHENSIVE GYN HISTORY:  PAP History: Denies abnormal Paps.  Infection History: Denies STDs. Denies PID.  Benign History: Denies uterine fibroids. Denies ovarian cysts. Denies endometriosis.   Cancer History: Denies cervical cancer. Denies uterine cancer or hyperplasia. Denies ovarian cancer. Denies vulvar cancer or pre-cancer. Denies vaginal cancer or pre-cancer. Denies breast cancer. Denies colon cancer.  Sexual Activity History:  has no sexual activity history on file.   Menstrual History: Age of menarche: 13 years. Every 28 days, flows for 5 days. Moderate flow.  Dysmenorrhea History: Reports severe dysmenorrhea.  Contraception History: None      HPI:   36 y.o.  Patient's last menstrual period was 2016 (approximate).   Patient is  here for pregnancy conceived by IUI. All OB labs have been done as well as Materni T 21(per patient normal) She denies bladder, breast and bowel complaints.    ROS:  GENERAL: Denies weight gain or weight loss. Feeling well overall.   SKIN: Denies rash or lesions.   HEAD: Denies headache.   NODES: Denies enlarged lymph nodes.   CHEST: Denies shortness of breath.   ABDOMEN: No abdominal pain, constipation, diarrhea, nausea, vomiting or rectal bleeding.   URINARY: No frequency, dysuria, hematuria, or burning on urination.  REPRODUCTIVE: See HPI.   BREASTS: The patient denies pain, lumps, or nipple discharge.   HEMATOLOGIC: No easy bruisability.   MUSCULOSKELETAL: Denies joint pain or back pain.   NEUROLOGIC: Denies weakness.   PSYCHIATRIC: Denies depression, anxiety or mood swings.    PE:   Visit Vitals    BP (!) 144/86    Wt 71.3 kg (157 lb 3 oz)    LMP 2016 (Approximate)    BMI 24.62 kg/m2     APPEARANCE: Well nourished, well developed, in no acute distress.  ABDOMEN: Soft. No tenderness or masses. No hernias.  PELVIC:   VULVA: No lesions.  Normal female genitalia.  URETHRAL MEATUS: Normal size and location, no lesions, no prolapse.  URETHRA: No masses, tenderness, prolapse or scarring.  VAGINA: Moist and well rugated, some discharge (maybe Crinone), no significant cystocele or rectocele.  CERVIX: No lesions and discharge.  UTERUS: Normal size, regular shape, mobile, non-tender, bladder base nontender.  ADNEXA: No masses or tenderness.    ASSESSMENT:  AMA pregnancy  Conceived IUI  History of PTD at 36 weeks will need Pippa BUSCHN    PLAN:  RTO 4 weeks  Increase Diclegis to 1 tablet qam and 1 at 2pm.  Continue Crinone for now with history

## 2017-02-06 NOTE — PROGRESS NOTES
"Complaining of nausea, mostly in the evenings but is "queezy" all through the day. States she doesn't find Diclegis is really helping.  "

## 2017-02-08 LAB
BACTERIA UR CULT: NO GROWTH
CANDIDA RRNA VAG QL PROBE: NEGATIVE
G VAGINALIS RRNA GENITAL QL PROBE: NEGATIVE
T VAGINALIS RRNA GENITAL QL PROBE: NEGATIVE

## 2017-02-10 ENCOUNTER — TELEPHONE (OUTPATIENT)
Dept: OBSTETRICS AND GYNECOLOGY | Facility: CLINIC | Age: 37
End: 2017-02-10

## 2017-02-10 NOTE — TELEPHONE ENCOUNTER
We do not have coupon. Other option would be to try vaginal Prometrium but it is expensive too but not as expensive.

## 2017-02-10 NOTE — TELEPHONE ENCOUNTER
Patient states she went to the pharmacy and was charged $900 for CRINONE. Patient called clinic looking for coupon, she was notified we do not have any coupons for CRINONE to look on the website, patient verbalized the website did not have any coupons for CRINONE and was hoping we would have some in office. Patient was advised message would be sent to Dr Alves if she can recommend any other suggestions. Please advise.

## 2017-02-10 NOTE — TELEPHONE ENCOUNTER
----- Message from Eileen Stone sent at 2/10/2017 11:41 AM CST -----  Contact: [phone]  Sulaiman cost $900 and would like to know if she can  a coupon from the office today

## 2017-02-13 ENCOUNTER — TELEPHONE (OUTPATIENT)
Dept: OBSTETRICS AND GYNECOLOGY | Facility: CLINIC | Age: 37
End: 2017-02-13

## 2017-02-13 NOTE — TELEPHONE ENCOUNTER
Prescription was phoned in with Jonas from [facility] states she will call patient to confirm address for delivery. Patient was notified and verbalized understanding.

## 2017-02-13 NOTE — TELEPHONE ENCOUNTER
----- Message from Andreia Quiroga sent at 2/13/2017  1:41 PM CST -----  Contact: Self [phone]  Patient is calling to see if her medication was sent to the pharmacy. Please advice

## 2017-02-13 NOTE — TELEPHONE ENCOUNTER
Pt was informed, verbalized understanding.  States she has gone through [facility] [phone], they will fill the CRINONE for a cheaper price, patient doesn't mind paying $250 through them. Patient was advised I will call after clinic this morning.

## 2017-02-13 NOTE — TELEPHONE ENCOUNTER
Left v/m with Bimal at [facility] for [facility]

## 2017-02-16 ENCOUNTER — TELEPHONE (OUTPATIENT)
Dept: OBSTETRICS AND GYNECOLOGY | Facility: CLINIC | Age: 37
End: 2017-02-16

## 2017-02-16 NOTE — TELEPHONE ENCOUNTER
----- Message from Kaitlynn Mares sent at 2/16/2017 11:36 AM CST -----  Contact: Marek Hubbard/Katiana  [phone]  She states the pt marek has been approved with [employer] from 2/16/17--7/16/17  Authorization# [id number].  [facility] will be filling and [facility] at [phone] will be providing Home Health.  (this is the message Katiana wanted me to send).  Please advise

## 2017-02-24 ENCOUNTER — PATIENT MESSAGE (OUTPATIENT)
Dept: OBSTETRICS AND GYNECOLOGY | Facility: CLINIC | Age: 37
End: 2017-02-24

## 2017-02-24 NOTE — TELEPHONE ENCOUNTER
1. Did not receive labs from Dr. Pak' office  2. Use Crinone the night before starting injections which start at 16 weeks     --I know it is approved but do we have it set up for her to receive medicine (her Dad will give her the shots)  No weaning needed and needs to be same day each week.  3. Home dopplers safe but they can cause a lot of anxiety.

## 2017-03-01 ENCOUNTER — PATIENT MESSAGE (OUTPATIENT)
Dept: OBSTETRICS AND GYNECOLOGY | Facility: CLINIC | Age: 37
End: 2017-03-01

## 2017-03-02 NOTE — TELEPHONE ENCOUNTER
Spoke with patient.  She will have her dad or her aunt who is a nurse administer the injections to her.

## 2017-03-06 ENCOUNTER — ROUTINE PRENATAL (OUTPATIENT)
Dept: OBSTETRICS AND GYNECOLOGY | Facility: CLINIC | Age: 37
End: 2017-03-06
Payer: COMMERCIAL

## 2017-03-06 ENCOUNTER — CLINICAL SUPPORT (OUTPATIENT)
Dept: OBSTETRICS AND GYNECOLOGY | Facility: CLINIC | Age: 37
End: 2017-03-06
Payer: COMMERCIAL

## 2017-03-06 VITALS
BODY MASS INDEX: 24.9 KG/M2 | SYSTOLIC BLOOD PRESSURE: 160 MMHG | WEIGHT: 158.94 LBS | DIASTOLIC BLOOD PRESSURE: 100 MMHG

## 2017-03-06 DIAGNOSIS — Z87.51 HISTORY OF PRETERM DELIVERY: ICD-10-CM

## 2017-03-06 DIAGNOSIS — Z34.82 ENCOUNTER FOR SUPERVISION OF OTHER NORMAL PREGNANCY IN SECOND TRIMESTER: Primary | ICD-10-CM

## 2017-03-06 DIAGNOSIS — O09.522 AMA (ADVANCED MATERNAL AGE) MULTIGRAVIDA 35+, SECOND TRIMESTER: ICD-10-CM

## 2017-03-06 DIAGNOSIS — I10 ESSENTIAL HYPERTENSION: ICD-10-CM

## 2017-03-06 DIAGNOSIS — Z98.891 HISTORY OF C-SECTION: ICD-10-CM

## 2017-03-06 PROBLEM — Z34.92 ENCOUNTER FOR SUPERVISION OF NORMAL PREGNANCY IN SECOND TRIMESTER: Status: ACTIVE | Noted: 2017-02-06

## 2017-03-06 LAB
CREAT UR-MCNC: 57 MG/DL
PROT UR-MCNC: <7 MG/DL
PROT/CREAT RATIO, UR: NORMAL

## 2017-03-06 PROCEDURE — 82570 ASSAY OF URINE CREATININE: CPT

## 2017-03-06 PROCEDURE — 99999 PR PBB SHADOW E&M-EST. PATIENT-LVL II: CPT | Mod: PBBFAC,,, | Performed by: OBSTETRICS & GYNECOLOGY

## 2017-03-06 PROCEDURE — 0502F SUBSEQUENT PRENATAL CARE: CPT | Mod: S$GLB,,, | Performed by: OBSTETRICS & GYNECOLOGY

## 2017-03-06 RX ORDER — NAPROXEN SODIUM 220 MG/1
81 TABLET, FILM COATED ORAL DAILY
COMMUNITY
End: 2017-03-24

## 2017-03-06 RX ORDER — HYDROXYPROGESTERONE CAPROATE 250 MG/ML
INJECTION INTRAMUSCULAR WEEKLY
COMMUNITY
Start: 2017-02-16 | End: 2017-07-19 | Stop reason: ALTCHOICE

## 2017-03-06 RX ORDER — DOXYLAMINE SUCCINATE AND PYRIDOXINE HYDROCHLORIDE 10; 10 MG/1; MG/1
1 TABLET, DELAYED RELEASE ORAL 2 TIMES DAILY
Qty: 60 TABLET | Refills: 2 | Status: SHIPPED | OUTPATIENT
Start: 2017-03-06 | End: 2017-06-09 | Stop reason: SDUPTHER

## 2017-03-06 RX ORDER — HYDROXYPROGESTERONE CAPROATE 250 MG/ML
250 INJECTION INTRAMUSCULAR
Status: DISCONTINUED | OUTPATIENT
Start: 2017-03-06 | End: 2017-07-21 | Stop reason: HOSPADM

## 2017-03-06 NOTE — PROGRESS NOTES
White coat HTN as well will have Home Health Nurse fax BP at home since it is always so much better at home. Start baby aspirin, MFM next visit, protein/creatnine ratio, start Pippa. Labs from Dr. Pak will be scanned in.    PE:   BP (!) 160/100  Wt 72.1 kg (158 lb 15.2 oz)  LMP 07/08/2016 (Approximate)  BMI 24.9 kg/m2  APPEARANCE: Well nourished, well developed, in no acute distress.  PELVIC:   VULVA: Left sided Bartholin's Gland enlargement declines I&D and is non-tender. Normal female genitalia.  URETHRAL MEATUS: Normal size and location, no lesions, no prolapse.  URETHRA: No masses, tenderness, prolapse or scarring.  Bartholin's Gland Cyst

## 2017-03-06 NOTE — PROGRESS NOTES
Pt received marek 250mg IM to the R upper outer quad. Pt tolerated well. Pt will wait in waiting area for 10 min to be sure she doesn't have a reaction.   Pt supplied mediation  Lot [id number]  Exp 4/19

## 2017-03-11 ENCOUNTER — PATIENT MESSAGE (OUTPATIENT)
Dept: OBSTETRICS AND GYNECOLOGY | Facility: CLINIC | Age: 37
End: 2017-03-11

## 2017-03-12 ENCOUNTER — HOSPITAL ENCOUNTER (EMERGENCY)
Facility: HOSPITAL | Age: 37
Discharge: HOME | End: 2017-03-12
Attending: EMERGENCY MEDICINE
Payer: COMMERCIAL

## 2017-03-12 VITALS
WEIGHT: 156.5 LBS | HEART RATE: 95 BPM | BODY MASS INDEX: 24.56 KG/M2 | RESPIRATION RATE: 20 BRPM | OXYGEN SATURATION: 99 % | TEMPERATURE: 98 F | SYSTOLIC BLOOD PRESSURE: 136 MMHG | DIASTOLIC BLOOD PRESSURE: 85 MMHG | HEIGHT: 67 IN

## 2017-03-12 DIAGNOSIS — N75.0 BARTHOLIN GLAND CYST: Primary | ICD-10-CM

## 2017-03-12 PROCEDURE — 99283 EMERGENCY DEPT VISIT LOW MDM: CPT | Mod: 25

## 2017-03-12 PROCEDURE — 56420 I&D BARTHOLINS GLAND ABSCESS: CPT

## 2017-03-12 PROCEDURE — 25000003 PHARM REV CODE 250: Performed by: EMERGENCY MEDICINE

## 2017-03-12 RX ORDER — CLINDAMYCIN HYDROCHLORIDE 150 MG/1
300 CAPSULE ORAL 4 TIMES DAILY
Qty: 40 CAPSULE | Refills: 0 | Status: SHIPPED | OUTPATIENT
Start: 2017-03-12 | End: 2017-03-16 | Stop reason: SDUPTHER

## 2017-03-12 RX ORDER — CLINDAMYCIN HYDROCHLORIDE 150 MG/1
300 CAPSULE ORAL
Status: COMPLETED | OUTPATIENT
Start: 2017-03-12 | End: 2017-03-12

## 2017-03-12 RX ORDER — BUPIVACAINE HYDROCHLORIDE 5 MG/ML
5 INJECTION, SOLUTION EPIDURAL; INTRACAUDAL
Status: COMPLETED | OUTPATIENT
Start: 2017-03-12 | End: 2017-03-12

## 2017-03-12 RX ADMIN — CLINDAMYCIN HYDROCHLORIDE 300 MG: 150 CAPSULE ORAL at 08:03

## 2017-03-12 RX ADMIN — BUPIVACAINE HYDROCHLORIDE 25 MG: 5 INJECTION, SOLUTION EPIDURAL; INTRACAUDAL at 06:03

## 2017-03-12 NOTE — DISCHARGE INSTRUCTIONS
Bartholins Cyst: Common Treatments    The Bartholins glands are a pair of very small glands found inside the labia (vaginal lips). (There is one on either side.) The glands produce fluid to help keep the vagina moist. When the opening of a Bartholins gland becomes blocked, the gland may swell and form a cyst. The cyst may vary in size from small to large (1 to 3 cm in size). It may feel like a firm lump within the labia.  Treatment depends on various factors. These include the size of the cyst, whether it causes symptoms, and whether its infected. Small and/or uninfected cysts dont usually need treatment. Large cysts and those that are painful or infected will likely need treatment. Below are some common treatments that may be done:  · Incision and drainage: With this procedure, the area over the cyst is numbed. The cyst is cut and drained. Often, a thin tube with a balloon on the end (called a Word catheter) is then inserted into the empty cyst. This allows for further drainage of fluid. The catheter is left in place for 4 to 6 weeks. It is then removed by the healthcare provider.  · Marsupialization: With this procedure, the area over the cyst is numbed. The cyst is cut and drained. The edges of the skin are then stitched to create a small opening that will allow for further drainage of fluid.  Note: If you have recurrent cysts, other treatments may be needed. Your provider can tell you more about these options.  If your cyst is infected, antibiotics will likely be prescribed. Most infections of Bartholins cysts are due to bacteria that are normally present in the vagina. Sometimes, the bacteria may have been passed to you during sex. This is called a sexually transmitted disease (STD). A test (culture) of the fluid can confirm if you have an STD.  Home care  Medicines  · If antibiotics are prescribed, be sure to take them exactly as directed. Dont stop taking the medicine, even if you start to feel  better. Note: If the cause of your infection is an STD, any sexual partners you have will also need to be tested and treated.  · If you have pain, use over-the-counter pain medicine as directed. If needed, stronger pain medicines can be prescribed.   General care  · To help relieve discomfort, you may be advised to take sitz baths for the next few days. For this, you soak in bath filled with 2 to 3 inches of warm water for 10 to 15 minutes, a few times a day.  · Avoid having sex until the cyst has healed. If the cause of your infection is an STD, also avoid having sex until you and any partners you have are done with treatment.  Follow-up care  Follow up with your healthcare provider as directed. Be sure to keep all appointments. These are needed to ensure that you are recovering well after your treatment. If you have a catheter, your provider will let you know when to return to have it removed.  When to seek medical advice  Call your healthcare provider right away if any of these occur:  · Fever does not go down or worsens  · Increased redness, pain, swelling, or foul-smelling drainage from the cyst or the area around it  · Pain in the lower abdomen   · New rash or joint pain  · Catheter falls out before the scheduled time to remove it (only if a Word catheter was placed)  Date Last Reviewed: 6/11/2015  © 6746-2064 [employer]. [address] [place] [address]. All rights reserved. This information is not intended as a substitute for professional medical care. Always follow your healthcare professional's instructions.          Understanding Bartholin Cyst and Abscess    A woman has two Bartholin glands. They are located on the sides of the vaginal opening. These pea-sized glands make mucus. This mucus lubricates the outside part of the vagina (vulva). If a tube (duct) in one of these glands becomes blocked, it can cause a cyst or abscess.  What causes a Bartholin cyst or abscess?  A cyst can  form when the duct of a Bartholin gland becomes blocked. The mucus cant come out of the gland. It builds up. If the cyst becomes infected, it may turn into an abscess.  A blockage in the gland can occur from an injury or an infection. It may also be linked to a sexually transmitted disease.  Symptoms of a Bartholin cyst or abscess  A Bartholin cyst starts as a small bump. It may cause no other symptoms. Or it may grow bigger. It can then cause swelling and pain. If an abscess forms, it can be very painful. You may have trouble walking, sitting, or having sex.  Treatment for a Bartholin cyst or abscess  A cyst that doesnt cause any symptoms may not need to be treated. It may go away on its own. But if you feel discomfort or pain, treatment options include:  · Medicine. Over-the-counter pain medicines can help. In some cases, you may need antibiotics if an infection is severe or a cyst or abscess comes back.  · Sitz bath. Soaking the genital area in warm water can ease pain.  · Drainage. Cutting open the cyst allows the fluid inside it to drain. This eases discomfort and pain. The doctor may insert a tube (catheter) to help with drainage. This catheter may need to stay in place for up to 3 weeks.  · Surgery. You may need to have the Bartholin glands removed if other treatments dont work.  When to call your healthcare provider  Call your healthcare provider right away if you have any of these:  · Fever of 100.4°F (38°C) or higher, or as directed  · Redness, swelling, or fluid leaking from the cyst that gets worse  · Pain that gets worse  · Symptoms that dont get better, or get worse  · New symptoms   Date Last Reviewed: 6/1/2016  © 4524-2631 [employer]. [address] [place] [address], [place] [address]. All rights reserved. This information is not intended as a substitute for professional medical care. Always follow your healthcare professional's instructions.

## 2017-03-12 NOTE — ED AVS SNAPSHOT
[facility]  [address]  [place] [address]               Crystal Hurley   3/12/2017  5:39 AM   ED    Description:  Female : 1980   Department:  [facility]           Your Care was Coordinated By:     Provider Role From To    Swati Mitchell MD Attending Provider 17 0659 --      Reason for Visit     Bartholin's Cyst           Diagnoses this Visit        Comments    Bartholin gland cyst    -  Primary       ED Disposition     None           To Do List           Follow-up Information     Follow up with Tiffany Alves MD In 1 day.    Specialties:  Obstetrics, Obstetrics and Gynecology    Contact information:    [address]  [place] [id number] [phone]         These Medications        Disp Refills Start End    clindamycin (CLEOCIN) 150 MG capsule 40 capsule 0 3/12/2017 3/17/2017    Take 2 capsules (300 mg total) by mouth 4 (four) times daily. - Oral    Pharmacy: [facility]/pharmacy #[id number] - [place] - [address] AT [facility] Ph #: [phone]         [facility]     [facility] Care [facility] -  Assistance  Registered nurses in the [facility] provide clinical advisement, health education, appointment booking, and other advisory services.  Call for this free service at [phone].             Medications           Message regarding Medications     Verify the changes and/or additions to your medication regime listed below are the same as discussed with your clinician today.  If any of these changes or additions are incorrect, please notify your healthcare provider.        START taking these NEW medications        Refills    clindamycin (CLEOCIN) 150 MG capsule 0    Sig: Take 2 capsules (300 mg total) by mouth 4 (four) times daily.    Class: Print    Route: Oral      These medications were administered today        Dose Freq    bupivacaine (PF) 0.5% (5 mg/ml) injection  "25 mg 5 mL ED 1 Time    Sig: Inject 5 mLs (25 mg total) into the skin ED 1 Time.    Class: Normal    Route: Subcutaneous    clindamycin capsule 300 mg 300 mg ED 1 Time    Sig: Take 2 capsules (300 mg total) by mouth ED 1 Time.    Class: Normal    Route: Oral           Verify that the below list of medications is an accurate representation of the medications you are currently taking.  If none reported, the list may be blank. If incorrect, please contact your healthcare provider. Carry this list with you in case of emergency.           Current Medications     DICLEGIS 10-10 mg TbEC Take 1 tablet by mouth 2 (two) times daily.    ROOPA 250 mg/mL (1 mL) Oil Inject into the muscle once a week.     aspirin 81 MG Chew Take 81 mg by mouth once daily.    clindamycin (CLEOCIN) 150 MG capsule Take 2 capsules (300 mg total) by mouth 4 (four) times daily.    clindamycin capsule 300 mg Take 2 capsules (300 mg total) by mouth ED 1 Time.    docusate sodium (COLACE) 50 MG capsule Take by mouth 2 (two) times daily.    ROOPA Oil 250 mg Inject 1 mL (250 mg total) into the muscle one time.    prenatal vit no.91-iron-FA-dha (PRENATAL + DHA) 28 mg iron- 975 mcg-200 mg Cmpk Take by mouth.    ranitidine (ZANTAC) 150 MG capsule Take 150 mg by mouth 2 (two) times daily as needed for Heartburn.           Clinical Reference Information           Your Vitals Were     BP Pulse Temp Resp Height Weight    148/101 (Patient Position: Sitting) 107 98.1 °F (36.7 °C) (Oral) 20 5' 7" (1.702 m) 71 kg (156 lb 8.4 oz)    Last Period SpO2 BMI          07/08/2016 (Approximate) 99% 24.52 kg/m2        Allergies as of 3/12/2017     No Known Allergies      Immunizations Administered on Date of Encounter - 3/12/2017     None      ED Micro, Lab, POCT     None      ED Imaging Orders     None        Discharge Instructions         Bartholins Cyst: Common Treatments    The Bartholins glands are a pair of very small glands found inside the labia (vaginal lips). " (There is one on either side.) The glands produce fluid to help keep the vagina moist. When the opening of a Bartholins gland becomes blocked, the gland may swell and form a cyst. The cyst may vary in size from small to large (1 to 3 cm in size). It may feel like a firm lump within the labia.  Treatment depends on various factors. These include the size of the cyst, whether it causes symptoms, and whether its infected. Small and/or uninfected cysts dont usually need treatment. Large cysts and those that are painful or infected will likely need treatment. Below are some common treatments that may be done:  · Incision and drainage: With this procedure, the area over the cyst is numbed. The cyst is cut and drained. Often, a thin tube with a balloon on the end (called a Word catheter) is then inserted into the empty cyst. This allows for further drainage of fluid. The catheter is left in place for 4 to 6 weeks. It is then removed by the healthcare provider.  · Marsupialization: With this procedure, the area over the cyst is numbed. The cyst is cut and drained. The edges of the skin are then stitched to create a small opening that will allow for further drainage of fluid.  Note: If you have recurrent cysts, other treatments may be needed. Your provider can tell you more about these options.  If your cyst is infected, antibiotics will likely be prescribed. Most infections of Bartholins cysts are due to bacteria that are normally present in the vagina. Sometimes, the bacteria may have been passed to you during sex. This is called a sexually transmitted disease (STD). A test (culture) of the fluid can confirm if you have an STD.  Home care  Medicines  · If antibiotics are prescribed, be sure to take them exactly as directed. Dont stop taking the medicine, even if you start to feel better. Note: If the cause of your infection is an STD, any sexual partners you have will also need to be tested and treated.  · If you have  pain, use over-the-counter pain medicine as directed. If needed, stronger pain medicines can be prescribed.   General care  · To help relieve discomfort, you may be advised to take sitz baths for the next few days. For this, you soak in bath filled with 2 to 3 inches of warm water for 10 to 15 minutes, a few times a day.  · Avoid having sex until the cyst has healed. If the cause of your infection is an STD, also avoid having sex until you and any partners you have are done with treatment.  Follow-up care  Follow up with your healthcare provider as directed. Be sure to keep all appointments. These are needed to ensure that you are recovering well after your treatment. If you have a catheter, your provider will let you know when to return to have it removed.  When to seek medical advice  Call your healthcare provider right away if any of these occur:  · Fever does not go down or worsens  · Increased redness, pain, swelling, or foul-smelling drainage from the cyst or the area around it  · Pain in the lower abdomen   · New rash or joint pain  · Catheter falls out before the scheduled time to remove it (only if a Word catheter was placed)  Date Last Reviewed: 6/11/2015  © 8242-9611 [employer]. [address] [place] [address], [place] [address]. All rights reserved. This information is not intended as a substitute for professional medical care. Always follow your healthcare professional's instructions.          Understanding Bartholin Cyst and Abscess    A woman has two Bartholin glands. They are located on the sides of the vaginal opening. These pea-sized glands make mucus. This mucus lubricates the outside part of the vagina (vulva). If a tube (duct) in one of these glands becomes blocked, it can cause a cyst or abscess.  What causes a Bartholin cyst or abscess?  A cyst can form when the duct of a Bartholin gland becomes blocked. The mucus cant come out of the gland. It builds up. If the cyst becomes infected,  it may turn into an abscess.  A blockage in the gland can occur from an injury or an infection. It may also be linked to a sexually transmitted disease.  Symptoms of a Bartholin cyst or abscess  A Bartholin cyst starts as a small bump. It may cause no other symptoms. Or it may grow bigger. It can then cause swelling and pain. If an abscess forms, it can be very painful. You may have trouble walking, sitting, or having sex.  Treatment for a Bartholin cyst or abscess  A cyst that doesnt cause any symptoms may not need to be treated. It may go away on its own. But if you feel discomfort or pain, treatment options include:  · Medicine. Over-the-counter pain medicines can help. In some cases, you may need antibiotics if an infection is severe or a cyst or abscess comes back.  · Sitz bath. Soaking the genital area in warm water can ease pain.  · Drainage. Cutting open the cyst allows the fluid inside it to drain. This eases discomfort and pain. The doctor may insert a tube (catheter) to help with drainage. This catheter may need to stay in place for up to 3 weeks.  · Surgery. You may need to have the Bartholin glands removed if other treatments dont work.  When to call your healthcare provider  Call your healthcare provider right away if you have any of these:  · Fever of 100.4°F (38°C) or higher, or as directed  · Redness, swelling, or fluid leaking from the cyst that gets worse  · Pain that gets worse  · Symptoms that dont get better, or get worse  · New symptoms   Date Last Reviewed: 6/1/2016  © 8331-2665 [employer]. [address] [place] [address], [place] [address]. All rights reserved. This information is not intended as a substitute for professional medical care. Always follow your healthcare professional's instructions.          Your Scheduled Appointments     Apr 12, 2017  8:30 AM CDT   Ultrasound with ULTRASOUND, VINCENT   [facility] ([facility])    [address] [place] [address]  Diagnostics  [place] [address]               Apr 12, 2017 10:15 AM CDT   Routine Prenatal Visit with MD [place] [facility] ([place])    [address]  [address]th [address] [facility], [address]  [place] [address] [phone]               [facility] complies with applicable Federal civil rights laws and does not discriminate on the basis of race, color, national origin, age, disability, or sex.        Language Assistance Services     ATTENTION: Language assistance services are available, free of charge. Please call [phone].      ATENCIÓN: Si habla español, tiene a matias disposición servicios gratuitos de asistencia lingüística. Llame al [phone].     CHÚ Ý: N?u b?n nói Ti?ng Vi?t, có các d?ch v? h? tr? ngôn ng? mi?n phí dành cho b?n. G?i s? [phone].

## 2017-03-12 NOTE — ED PROVIDER NOTES
Encounter Date: 3/12/2017       History     Chief Complaint   Patient presents with    Bartholin's Cyst     pt presents to triage aMBULATORY IN NO DISTRESS AND REQUESTING TO HAVE A BARTHOLIN CYST ON THE LEFT SIDE RECHECKED; PT REPORTS APPROX 17 WEEKS PREGNANT AND WAS SEEN AT OB OFFICE ON MONDAY AND CYST WAS NOT READY TO BE LANCED AT THAT TIME     Review of patient's allergies indicates:  No Known Allergies  HPI Comments: 37 Y/O WF PRESENTS WITH BARTHOLIN CYST LEFT LABIA SINCE LAST WEEK.  PT REPORTS SHE IS 17 WEEKS PREGNANT AND WAS SEEN BY DR. MATIAS LAST WEEK FOR THIS COMPLAINT.  SHE WAS TOLD THERE WAS NOTHING TO DRAIN.  PT HAS NOTED INCREASED SWELLING AND PAIN IN THE REGION.  NO FEVER/CHILLS.  NO DRAINAGE.  + HX OF SIMILAR PROBLEM DURING LAST PREGNANCY 6 YEARS AGO.  NO N/V.  NO RECENT ABX TREATMENT.  NO HX OF STD.  NO ABD PAIN.  NO VAGINAL BLEEDING.   PT REPORTS SHE HAS BEEN TOLD HER B/P WAS HIGH IN THE PAST BUT DOES NOT TAKE ANTI-HTN MEDS.  NO HA, NUMBNESS/WEAKNESS.     The history is provided by the patient. No  was used.     Past Medical History:   Diagnosis Date    Anxiety     Arthritis 10/2014    right hip    Bartholin's cyst     Elevated BP     Miscarriage      Past Surgical History:   Procedure Laterality Date     SECTION      DILATION AND CURETTAGE OF UTERUS  2014     Family History   Problem Relation Age of Onset    Breast cancer Neg Hx     Colon cancer Neg Hx     Ovarian cancer Neg Hx      Social History   Substance Use Topics    Smoking status: Never Smoker    Smokeless tobacco: Never Used    Alcohol use No     Review of Systems   Constitutional: Negative for chills and fever.   Eyes: Negative.    Respiratory: Negative for cough and shortness of breath.    Cardiovascular: Negative for leg swelling.   Gastrointestinal: Negative for abdominal pain, diarrhea, nausea and vomiting.   Endocrine: Negative for polydipsia and polyphagia.   Genitourinary: Negative  for difficulty urinating, dysuria, flank pain, hematuria, pelvic pain and vaginal bleeding.   Musculoskeletal: Negative for arthralgias and myalgias.   Skin: Negative for pallor.        BARTHOLINS CYST   Allergic/Immunologic: Negative for immunocompromised state.   Neurological: Negative for dizziness and headaches.   Hematological: Does not bruise/bleed easily.   Psychiatric/Behavioral: Negative for agitation. The patient is nervous/anxious.    All other systems reviewed and are negative.      Physical Exam   Initial Vitals   BP Pulse Resp Temp SpO2   03/12/17 0537 03/12/17 0537 03/12/17 0537 03/12/17 0537 03/12/17 0537   148/101 107 20 98.1 °F (36.7 °C) 99 %     Physical Exam    Nursing note and vitals reviewed.  Constitutional: She appears well-developed and well-nourished. She is not diaphoretic. No distress.   ANXIOUS WF IN NO DISTRESS.  REPEAT B/P IS WNL   HENT:   Head: Normocephalic and atraumatic.   Eyes: Conjunctivae and EOM are normal. No scleral icterus.   Neck: Normal range of motion. Neck supple.   Cardiovascular: Normal rate, regular rhythm and normal heart sounds. Exam reveals no gallop and no friction rub.    No murmur heard.  Pulmonary/Chest: Breath sounds normal. No respiratory distress. She has no wheezes. She has no rhonchi. She has no rales.   Abdominal: Soft. Bowel sounds are normal. She exhibits no distension. There is no tenderness. There is no rebound and no guarding.   Genitourinary:       Pelvic exam was performed with patient supine.   Musculoskeletal: Normal range of motion.   Lymphadenopathy:     She has no cervical adenopathy.   Neurological: She is alert and oriented to person, place, and time.   Skin: Skin is warm and dry. There is erythema.   BARTHOLINS CYST   Psychiatric: Her behavior is normal. Judgment and thought content normal.         ED Course   I & D - Incision and Drainage  Date/Time: 3/12/2017 7:55 PM  Location procedure was performed: [facility] EMERGENCY DEPARTMENT  Performed  by: SHANNON VILLAFUERTE  Authorized by: SHANNON VILLAFUERTE   Assisting provider: SHANNON VILLAFUERTE  Pre-operative diagnosis: BARTHOLINS CYST  Post-operative diagnosis: BARTHOLINS CYST  Consent Done: Yes  Consent: Verbal consent obtained.  Risks and benefits: risks, benefits and alternatives were discussed  Consent given by: patient  Type: cyst  Body area: anogenital  Location details: Bartholin's gland  Anesthesia: local infiltration    Anesthesia:  Anesthesia: local infiltration  Local Anesthetic: bupivacaine 0.5% without epinephrine   Anesthetic total: 3 mL  Patient sedated: no  Description of findings: INFECTED CYST   Scalpel size: 11  Incision type: single straight  Complexity: complex  Drainage: pus  Drainage amount: copious  Wound treatment: incision,  drainage and  expression of material  Estimated blood loss (mL): 0  Patient tolerance: Patient tolerated the procedure well with no immediate complications        Labs Reviewed - No data to display          Medical Decision Making:   Initial Assessment:   HPI Comments: 37 Y/O WF PRESENTS WITH BARTHOLIN CYST LEFT LABIA SINCE LAST WEEK.  PT REPORTS SHE IS 17 WEEKS PREGNANT AND WAS SEEN BY DR. MATIAS LAST WEEK FOR THIS COMPLAINT.  SHE WAS TOLD THERE WAS NOTHING TO DRAIN.  PT HAS NOTED INCREASED SWELLING AND PAIN IN THE REGION.  NO FEVER/CHILLS.  NO DRAINAGE.  + HX OF SIMILAR PROBLEM DURING LAST PREGNANCY 6 YEARS AGO.  NO N/V.  NO RECENT ABX TREATMENT.  NO HX OF STD.  NO ABD PAIN.  NO VAGINAL BLEEDING.   PT REPORTS SHE HAS BEEN TOLD HER B/P WAS HIGH IN THE PAST BUT DOES NOT TAKE ANTI-HTN MEDS.  NO HA, NUMBNESS/WEAKNESS.         Differential Diagnosis:   DDX: INFECTED BARTHOLINS CYST, ABSCESS, CELLULITIS  ED Management:  BLOOD PRESSURE MARKEDLY IMPROVED ON RECHECK    CYST I AND D DONE IN ED.  PT TOLERATED WELL.  PT WILL BE D/C HOME WITH RX FOR CLINDAMYCIN.  SHE IS ALLERGIC TO PCN.  PT IS INSTRUCTED TO F/U WITH OB IN 2 DAYS.     Pt counseled on their  diagnosis and the importance of following up with PCP.  Pt also cautioned on when to return to ED.  Pt verbalizes understanding of discharge plan and will return to ED immediately if symptoms worsen                     ED Course     Clinical Impression:   The encounter diagnosis was Bartholin gland cyst.    Disposition:   Disposition: Discharged  Condition: Stable       Swati Mitchell MD  03/12/17 1957       Swati Mitchell MD  03/12/17 1957

## 2017-03-13 NOTE — TELEPHONE ENCOUNTER
Please advise on mychart encounter (see second message from patient before sending, if medication is appropriate)

## 2017-03-13 NOTE — TELEPHONE ENCOUNTER
Patient states she went to the ED yesterday; they drained it and put her on antibiotics.      Notified per  to go a head and complete the antibiotics.  No need for follow up unless it worsens.  Patient verbalized understanding

## 2017-03-13 NOTE — TELEPHONE ENCOUNTER
----- Message from Susie Macias MA sent at 3/13/2017 10:33 AM CDT -----  Contact: [phone]  Please return her call at your earliest convenience. Please advise

## 2017-03-16 ENCOUNTER — OFFICE VISIT (OUTPATIENT)
Dept: OBSTETRICS AND GYNECOLOGY | Facility: CLINIC | Age: 37
End: 2017-03-16
Payer: COMMERCIAL

## 2017-03-16 ENCOUNTER — TELEPHONE (OUTPATIENT)
Dept: OBSTETRICS AND GYNECOLOGY | Facility: CLINIC | Age: 37
End: 2017-03-16

## 2017-03-16 VITALS
HEIGHT: 67 IN | SYSTOLIC BLOOD PRESSURE: 146 MMHG | DIASTOLIC BLOOD PRESSURE: 92 MMHG | BODY MASS INDEX: 25.19 KG/M2 | WEIGHT: 160.5 LBS

## 2017-03-16 DIAGNOSIS — N75.1 BARTHOLIN'S GLAND ABSCESS: Primary | ICD-10-CM

## 2017-03-16 PROCEDURE — 99499 UNLISTED E&M SERVICE: CPT | Mod: S$GLB,,, | Performed by: OBSTETRICS & GYNECOLOGY

## 2017-03-16 PROCEDURE — 99999 PR PBB SHADOW E&M-EST. PATIENT-LVL II: CPT | Mod: PBBFAC,,, | Performed by: OBSTETRICS & GYNECOLOGY

## 2017-03-16 PROCEDURE — 56420 I&D BARTHOLINS GLAND ABSCESS: CPT | Mod: S$GLB,,, | Performed by: OBSTETRICS & GYNECOLOGY

## 2017-03-16 RX ORDER — CLINDAMYCIN HYDROCHLORIDE 150 MG/1
300 CAPSULE ORAL 4 TIMES DAILY
Qty: 40 CAPSULE | Refills: 0 | Status: SHIPPED | OUTPATIENT
Start: 2017-03-16 | End: 2017-03-21

## 2017-03-16 NOTE — PROGRESS NOTES
Pre-Vulvar Abscess I&D Counseling:  The patient was informed of the risk of bleeding, pain and infection.  She was counseled on the conservative treatment of a vulvar abscess as well as operative outpatient management, and she agrees to proceed with in-office incision and drainage.    Exam:  There is a 4cm abscess of the left labium majus.  There is not surrounding cellulitis.  Normal hair distribution.  Adequate perineal body and normal urethral meatus.  Vaginal moist and well-rugated.    Procedure:  A time out was performed.  The base of the abscess was injected with 1% lidocaine without epinephrine.  A stab wound was made into the abscess with a #11 blade, and a small hemostat was used to widen the incision and allow for more adequate drainage.  A wick of iodoform gauze was inserted into the incision.    The patient tolerated the procedure well.    Assessment:  Incision and drainage of a vulvar abscess.    Post I&D of Vulvar Abscess Counseling:  The patient was counseled to manage post I&D pain with NSAIDs, Tylenol, or medication per the medication card.  She was advised to expect a bloody yellowish discharge for 24-48 hours.  If a gauze wick was placed, the patient should expect it to fall out.  She was advised to take warm sitz baths twice daily and to dry well with a cool hair dryer until the lesion has healed fully.  The patient was advised to avoid vaginal intercourse, douching, and tampons for at least a week.  She was counseled to call for evaluation in the event of heavy bleeding, fever greater than 101.0F, or worsening pain and redness at the I&D site.  Counseling lasted about 15 minutes, and all questions were answered.    Follow-up:  Pending biopsy results.

## 2017-03-16 NOTE — TELEPHONE ENCOUNTER
10 day treatment should have been enough. I do not feel any urgency to see her unless she strongly desires to be seen

## 2017-03-16 NOTE — TELEPHONE ENCOUNTER
----- Message from Blanca Hurtado sent at 3/16/2017  1:22 PM CDT -----  Contact: self, [phone]  Patient called in returning your call. Please advise.

## 2017-03-16 NOTE — TELEPHONE ENCOUNTER
"Patient states she meant to say she was taking it 2 po four times a day.  The area feels worse today then it did yesterday and it "feels tight".  Patient advised to come now.  Patient verbalized understanding.  "

## 2017-03-16 NOTE — TELEPHONE ENCOUNTER
----- Message from Chloe Horner sent at 3/16/2017  8:08 AM CDT -----  Contact: [phone]/ self   Patient called stating she feeling a little better after the treatment she had but the problem its still there . Pt wants to know if Dr Alves wants to see her again . Please advise

## 2017-03-16 NOTE — TELEPHONE ENCOUNTER
Area is  to touch but not as red.  She will complete the antibiotics today.  Wants to know should she come in today?    Please advise

## 2017-03-17 ENCOUNTER — TELEPHONE (OUTPATIENT)
Dept: OBSTETRICS AND GYNECOLOGY | Facility: CLINIC | Age: 37
End: 2017-03-17

## 2017-03-17 ENCOUNTER — PATIENT MESSAGE (OUTPATIENT)
Dept: OBSTETRICS AND GYNECOLOGY | Facility: CLINIC | Age: 37
End: 2017-03-17

## 2017-03-17 NOTE — TELEPHONE ENCOUNTER
----- Message from Chloe Horner sent at 3/17/2017 10:07 AM CDT -----  Contact: [phone]/Indra with [facility]   Ms Burrell its requesting to speak with you in regarding pt's orders. Please advise

## 2017-03-17 NOTE — TELEPHONE ENCOUNTER
States the orders did not come through completely on her end.  Requesting order to be refaxed.      Order has been re-faxed

## 2017-03-17 NOTE — TELEPHONE ENCOUNTER
COULD JUST BE NORMAL PREGNANCY STUFF BUT IF TEMPERATURE GREATER THAN 100.4 NEEDS TO GO TO EMERGENCY ROOM

## 2017-03-17 NOTE — TELEPHONE ENCOUNTER
"Patient is concerned about getting an infection and it harming the baby.  Complaining of body ache and had vomiting 7 times yesterday.  She has not had any vomiting yesterday but having a lot of body aches and just feeling over all bad "Like I have the flu".  Patient advised if she thinks she has the flu she needs to go to Urgent Care to be swabbed.  Patient states she doesn't think she has the flu but is concerned that by draining the cyst yesterday it has caused an infection that could hurt the baby.  I asked her if she had any swelling or redness or any indication of infection?  Patient states that area feels better then it has in days since it was drained "but I'm running a low grad fever of 99.  And when should I be concerned that this can harm the baby".  Notified it is no longer considered a fever until greater then 100.4.  Can continue to monitor and if has a fever greater then 100.4 needs to go to ED otherwise it could be muscle fatigue from being in the [facility] for so long yesterday.  Also because she is such an anxious person she maybe putting undo stress on her body.  Needs to relax over the weekend and I will discuss with Dr. Alves on Monday.  If any fever or site becomes infected needs to go to ED.  Patient verbalized understanding.     Please advise.  "

## 2017-03-20 ENCOUNTER — TELEPHONE (OUTPATIENT)
Dept: OBSTETRICS AND GYNECOLOGY | Facility: CLINIC | Age: 37
End: 2017-03-20

## 2017-03-20 NOTE — TELEPHONE ENCOUNTER
Patient called to cancel injection appt. States she was finally able to reach home health and they were going to administer injection today.

## 2017-03-20 NOTE — TELEPHONE ENCOUNTER
----- Message from Kaitlynn Mares sent at 3/20/2017  2:27 PM CDT -----  Contact: [phone]/self  Pt requesting to come into the office today to get her injection she states the home health nurse never showed up.  Please advise

## 2017-03-20 NOTE — TELEPHONE ENCOUNTER
----- Message from Tonie Salazar sent at 3/20/2017  2:46 PM CDT -----  Contact: Self  Pt would like to speak with the nurse in regards to her weekly shot pt would like to cancel her appt made with the nurse due to home coming to give her shot.     Pt can be contacted at [phone]

## 2017-03-20 NOTE — TELEPHONE ENCOUNTER
Notified to complete antibiotic.  Patient states she is feeling better.  As far as her Pippa is concerned the nurse can give it to her today, come between 3-3:30.  Patient verbalized understanding

## 2017-03-23 ENCOUNTER — PATIENT MESSAGE (OUTPATIENT)
Dept: OBSTETRICS AND GYNECOLOGY | Facility: CLINIC | Age: 37
End: 2017-03-23

## 2017-03-24 ENCOUNTER — ROUTINE PRENATAL (OUTPATIENT)
Dept: OBSTETRICS AND GYNECOLOGY | Facility: CLINIC | Age: 37
End: 2017-03-24
Payer: COMMERCIAL

## 2017-03-24 ENCOUNTER — TELEPHONE (OUTPATIENT)
Dept: OBSTETRICS AND GYNECOLOGY | Facility: CLINIC | Age: 37
End: 2017-03-24

## 2017-03-24 VITALS
DIASTOLIC BLOOD PRESSURE: 82 MMHG | SYSTOLIC BLOOD PRESSURE: 148 MMHG | BODY MASS INDEX: 25.03 KG/M2 | WEIGHT: 159.81 LBS

## 2017-03-24 DIAGNOSIS — N75.1 BARTHOLIN'S GLAND ABSCESS: ICD-10-CM

## 2017-03-24 PROCEDURE — 56420 I&D BARTHOLINS GLAND ABSCESS: CPT | Mod: 58,S$GLB,, | Performed by: OBSTETRICS & GYNECOLOGY

## 2017-03-24 PROCEDURE — 1160F RVW MEDS BY RX/DR IN RCRD: CPT | Mod: S$GLB,,, | Performed by: OBSTETRICS & GYNECOLOGY

## 2017-03-24 PROCEDURE — 99999 PR PBB SHADOW E&M-EST. PATIENT-LVL II: CPT | Mod: PBBFAC,,, | Performed by: OBSTETRICS & GYNECOLOGY

## 2017-03-24 PROCEDURE — 99499 UNLISTED E&M SERVICE: CPT | Mod: S$GLB,,, | Performed by: OBSTETRICS & GYNECOLOGY

## 2017-03-24 RX ORDER — CLINDAMYCIN HYDROCHLORIDE 300 MG/1
300 CAPSULE ORAL EVERY 8 HOURS
Qty: 21 CAPSULE | Refills: 1 | Status: SHIPPED | OUTPATIENT
Start: 2017-03-24 | End: 2017-04-03

## 2017-03-24 NOTE — MR AVS SNAPSHOT
[place] [facility]  [address], [address]  [address] [place] [address]  Phone: [phone]                  Crystal Hurley   3/24/2017 10:00 AM   Routine Prenatal    Description:  Female : 1980   Provider:  Pierre Obregon MD   Department:  [facility]           Reason for Visit     Routine Prenatal Visit                To Do List           Future Appointments        Provider Department Dept Phone    2017 8:30 AM ULTRASOUND, [facility] [place]     2017 10:15 AM Tiffany Alves MD [place] - [phone]      Goals (5 Years of Data)     None      [facility]     [facility] On Call Nurse Care Line -  Assistance  Registered nurses in the [facility] Center provide clinical advisement, health education, appointment booking, and other advisory services.  Call for this free service at [phone].             Medications           Message regarding Medications     Verify the changes and/or additions to your medication regime listed below are the same as discussed with your clinician today.  If any of these changes or additions are incorrect, please notify your healthcare provider.        STOP taking these medications     aspirin 81 MG Chew Take 81 mg by mouth once daily.           Verify that the below list of medications is an accurate representation of the medications you are currently taking.  If none reported, the list may be blank. If incorrect, please contact your healthcare provider. Carry this list with you in case of emergency.           Current Medications     DICLEGIS 10-10 mg TbEC Take 1 tablet by mouth 2 (two) times daily.    docusate sodium (COLACE) 50 MG capsule Take by mouth 2 (two) times daily.    ROOPA 250 mg/mL (1 mL) Oil Inject into the muscle once a week.     prenatal vit no.91-iron-FA-dha (PRENATAL + DHA) 28 mg iron- 975 mcg-200 mg Cmpk Take by mouth.    ranitidine (ZANTAC) 150 MG capsule Take 150 mg by mouth 2 (two)  "times daily as needed for Heartburn.           Clinical Reference Information           Prenatal Vitals     Enc. Date GA Prenatal Vitals Prenatal Pulse Pain Level Urine Albumin/Glucose Edema Presentation Dilation/Effacement/Station    3/24/17 18w4d 148/82 (A) / 72.5 kg (159 lb 13.3 oz)   5        3/16/17 17w3d 146/92 (A) / 72.8 kg (160 lb 7.9 oz)           3/6/17 16w0d 160/100 (A) / 72.1 kg (158 lb 15.2 oz)  / 160 / Present  0 Negative / Negative       17 12w0d 144/86 (A) / 71.3 kg (157 lb 3 oz)   0 Negative / Negative          TW.2 kg (2 lb 10.3 oz)   Pregravid weight: 71.3 kg (157 lb 3 oz)   Height: 5' 7" (1.702 m)   BMI: 24.6       Your Vitals Were     BP Weight Last Period BMI       148/82 72.5 kg (159 lb 13.3 oz) 2016 (Approximate) 25.03 kg/m2       Allergies as of 3/24/2017     No Known Allergies      Immunizations Administered on Date of Encounter - 3/24/2017     None      Language Assistance Services     ATTENTION: Language assistance services are available, free of charge. Please call [phone].      ATENCIÓN: Si habla español, tiene a matias disposición servicios gratuitos de asistencia lingüística. Llame al [phone].     DANIELLE Ý: N?u b?n nói Ti?ng Vi?t, có các d?ch v? h? tr? ngôn ng? mi?n phí dành cho b?n. G?i s? [phone].         [facility] complies with applicable Federal civil rights laws and does not discriminate on the basis of race, color, national origin, age, disability, or sex.        "

## 2017-03-24 NOTE — PROGRESS NOTES
Procedure note:  Patient presents with recurrence of Bartholin's gland in the left labia.  It is moderately high in the midportion of the left labia.  This was drained with I&D by Dr. Alves approximately 1 week ago.  It has reformed and is painful for the patient.  Examination shows no surrounding inflammation and the old approximately 1 cm incision site is present.  A sterile cotton swab was attempted to be insinuated into the previous incision but this was not possible as it had partially healed.  Patient states the drain fell out before the patient got home with the previous procedure.  Betadine was applied to the area and a 5 cc syringe with 18-gauge 1 and a needle was passed through the previous incision and into the abscess cavity.  Approximately 18 cc of 10 to gray liquid material was aspirated away and the patient felt immediate relief.  The opening was attempted to be enlarged to facilitate drainage but due to the thickened scarring at the incision site this was not satisfactorily enhanced.  Patient will be placed on clindamycin as before, she is informed that if this reoccurs a Word catheter or formal marsupialization may be required.  She is instructed to soak at home and to gently attempt to express any material from inside labia out through the opening.  Her next visit with Dr. Alves will be moved forward to accommodate a follow-up exam when Dr. Alves returns.  Patient may come in prior to that if needed.

## 2017-03-24 NOTE — TELEPHONE ENCOUNTER
----- Message from Madalyn Bansal sent at 3/24/2017  8:03 AM CDT -----  Contact: [phone]/ self   Patient called in because her condition is worsening and she would like to come in today. Please advise.

## 2017-03-24 NOTE — TELEPHONE ENCOUNTER
Patient has been scheduled with Dr. Obregon today. States she doesn't know why Bartholin cyst keeps coming back. Has tried warm compresses and warm baths with epson salt.

## 2017-03-29 ENCOUNTER — OFFICE VISIT (OUTPATIENT)
Dept: OBSTETRICS AND GYNECOLOGY | Facility: CLINIC | Age: 37
End: 2017-03-29
Payer: COMMERCIAL

## 2017-03-29 ENCOUNTER — TELEPHONE (OUTPATIENT)
Dept: OBSTETRICS AND GYNECOLOGY | Facility: CLINIC | Age: 37
End: 2017-03-29

## 2017-03-29 VITALS
DIASTOLIC BLOOD PRESSURE: 78 MMHG | WEIGHT: 160.69 LBS | SYSTOLIC BLOOD PRESSURE: 142 MMHG | BODY MASS INDEX: 25.22 KG/M2 | HEIGHT: 67 IN

## 2017-03-29 DIAGNOSIS — N75.1 BARTHOLIN'S GLAND ABSCESS: Primary | ICD-10-CM

## 2017-03-29 PROCEDURE — 99999 PR PBB SHADOW E&M-EST. PATIENT-LVL II: CPT | Mod: PBBFAC,,, | Performed by: OBSTETRICS & GYNECOLOGY

## 2017-03-29 PROCEDURE — 87186 SC STD MICRODIL/AGAR DIL: CPT

## 2017-03-29 PROCEDURE — 87077 CULTURE AEROBIC IDENTIFY: CPT

## 2017-03-29 PROCEDURE — 56420 I&D BARTHOLINS GLAND ABSCESS: CPT | Mod: 76,S$GLB,, | Performed by: OBSTETRICS & GYNECOLOGY

## 2017-03-29 PROCEDURE — 87070 CULTURE OTHR SPECIMN AEROBIC: CPT

## 2017-03-29 PROCEDURE — 99499 UNLISTED E&M SERVICE: CPT | Mod: S$GLB,,, | Performed by: OBSTETRICS & GYNECOLOGY

## 2017-03-29 NOTE — PROGRESS NOTES
Pre-Vulvar Abscess I&D Counseling:  The patient was informed of the risk of bleeding, pain and infection.  She was counseled on the conservative treatment of a vulvar abscess as well as operative outpatient management, and she agrees to proceed with in-office incision and drainage.    Exam:  There is a 3cm abscess of the left labium majus.  There is not surrounding cellulitis.  Normal hair distribution.  Adequate perineal body and normal urethral meatus.  Vaginal moist and well-rugated.    Procedure:  A time out was performed.  The base of the abscess was injected with 1% lidocaine with epinephrine.  A stab wound was made into the abscess with a #11 blade, and a small hemostat was used to widen the incision and allow for more adequate drainage.  A Word catheter was inserted into the incision.    The patient tolerated the procedure well.    Assessment:  Incision and drainage of a vulvar abscess.    Post I&D of Vulvar Abscess Counseling:  The patient was counseled to manage post I&D pain with NSAIDs, Tylenol, or medication per the medication card.  She was advised to expect a bloody yellowish discharge for 24-48 hours.  If a gauze wick was placed, the patient should expect it to fall out.  She was advised to take warm sitz baths twice daily and to dry well with a cool hair dryer until the lesion has healed fully.  The patient was advised to avoid vaginal intercourse, douching, and tampons for at least a week.  She was counseled to call for evaluation in the event of heavy bleeding, fever greater than 101.0F, or worsening pain and redness at the I&D site.  Counseling lasted about 15 minutes, and all questions were answered.    Follow-up:  Culture obtained

## 2017-03-29 NOTE — TELEPHONE ENCOUNTER
----- Message from Tonie Salazar sent at 3/29/2017  2:13 PM CDT -----  Contact: self  Pt stated she was seen today for a small procedure and her incision is opening. Pt would like to speak with someone as soon as possible in regards to how she should treat her incision     Pt can be contacted at [phone]

## 2017-03-29 NOTE — TELEPHONE ENCOUNTER
Per Dr. Alves patient ok to come now to put monsel's on incision. Patient verbalized and confirmed she is on her way.

## 2017-03-29 NOTE — PROGRESS NOTES
Patient called this afternoon stating she was bleeding from incision site and that the blood was running down her leg. She has returned for further exam. Minimal bleeding noted on gauze. No bleeding from vagina or incision site. Patient instructed to walk around in hallway to see if bleeding can be elicited.

## 2017-04-02 LAB — BACTERIA SPEC AEROBE CULT: NORMAL

## 2017-04-03 ENCOUNTER — PATIENT MESSAGE (OUTPATIENT)
Dept: OBSTETRICS AND GYNECOLOGY | Facility: CLINIC | Age: 37
End: 2017-04-03

## 2017-04-03 RX ORDER — SULFAMETHOXAZOLE AND TRIMETHOPRIM 400; 80 MG/1; MG/1
1 TABLET ORAL 2 TIMES DAILY
Qty: 20 TABLET | Refills: 0 | Status: SHIPPED | OUTPATIENT
Start: 2017-04-03 | End: 2017-04-13 | Stop reason: SDUPTHER

## 2017-04-03 NOTE — TELEPHONE ENCOUNTER
----- Message from Annamaria Castaneda sent at 4/3/2017 10:06 AM CDT -----  No. [phone]    Patient is returning Dr. Alves's call.

## 2017-04-03 NOTE — TELEPHONE ENCOUNTER
Patient notified culture grew out E. Coli. Appropriate antibiotics ordered that are safe in pregnancy

## 2017-04-05 ENCOUNTER — TELEPHONE (OUTPATIENT)
Dept: OBSTETRICS AND GYNECOLOGY | Facility: CLINIC | Age: 37
End: 2017-04-05

## 2017-04-05 ENCOUNTER — OFFICE VISIT (OUTPATIENT)
Dept: OBSTETRICS AND GYNECOLOGY | Facility: CLINIC | Age: 37
End: 2017-04-05
Payer: COMMERCIAL

## 2017-04-05 VITALS — BODY MASS INDEX: 25.08 KG/M2 | HEIGHT: 67 IN | WEIGHT: 159.81 LBS

## 2017-04-05 DIAGNOSIS — N75.1 BARTHOLIN'S GLAND ABSCESS: Primary | ICD-10-CM

## 2017-04-05 PROCEDURE — 99999 PR PBB SHADOW E&M-EST. PATIENT-LVL II: CPT | Mod: PBBFAC,,, | Performed by: OBSTETRICS & GYNECOLOGY

## 2017-04-05 PROCEDURE — 99024 POSTOP FOLLOW-UP VISIT: CPT | Mod: S$GLB,,, | Performed by: OBSTETRICS & GYNECOLOGY

## 2017-04-05 PROCEDURE — 87186 SC STD MICRODIL/AGAR DIL: CPT

## 2017-04-05 PROCEDURE — 87070 CULTURE OTHR SPECIMN AEROBIC: CPT

## 2017-04-05 PROCEDURE — 87077 CULTURE AEROBIC IDENTIFY: CPT

## 2017-04-05 PROCEDURE — 1160F RVW MEDS BY RX/DR IN RCRD: CPT | Mod: S$GLB,,, | Performed by: OBSTETRICS & GYNECOLOGY

## 2017-04-05 NOTE — TELEPHONE ENCOUNTER
----- Message from Vandana Levine sent at 4/5/2017  3:25 PM CDT -----  Contact: [facility] [phone]/ [facility]  requesting to speak with you regarding  patient's injections. Please advise.

## 2017-04-05 NOTE — PROGRESS NOTES
"OBSTETRIC HISTORY:   OB History      Para Term  AB TAB SAB Ectopic Multiple Living    4 1 0 1 2  2   1         COMPREHENSIVE GYN HISTORY:  PAP History: Denies abnormal Paps.  Infection History: Denies STDs. Denies PID.  Benign History: Denies uterine fibroids. Denies ovarian cysts. Denies endometriosis.   Cancer History: Denies cervical cancer. Denies uterine cancer or hyperplasia. Denies ovarian cancer. Denies vulvar cancer or pre-cancer. Denies vaginal cancer or pre-cancer. Denies breast cancer. Denies colon cancer.  Sexual Activity History:  has no sexual activity history on file.   Menstrual History: Age of menarche: 13 years. Every 28 days, flows for 5 days. Moderate flow.  Dysmenorrhea History: Reports severe dysmenorrhea.  Contraception History: None        HPI:   36 y.o.  Patient's last menstrual period was 2016 (approximate).   Patient is  here for follow up of Bartholin's Gland abscess for which she started Bactrim yesterday morning. The Word catheter had fallen out. She does not want I&D today but would like me to aspirate area. She denies bladder, breast and bowel complaints.    ROS:  GENERAL: Denies weight gain or weight loss. Feeling well overall.   SKIN: Denies rash or lesions.   HEAD: Denies headache.   NODES: Denies enlarged lymph nodes.   CHEST: Denies shortness of breath.   ABDOMEN: No abdominal pain, constipation, diarrhea, nausea, vomiting or rectal bleeding.   URINARY: No frequency, dysuria, hematuria, or burning on urination.  REPRODUCTIVE: See HPI.   BREASTS: The patient denies pain, lumps, or nipple discharge.   HEMATOLOGIC: No easy bruisability.   MUSCULOSKELETAL: Denies joint pain or back pain.   NEUROLOGIC: Denies weakness.   PSYCHIATRIC: Denies depression, anxiety or mood swings.    PE:   Ht 5' 7" (1.702 m)  Wt 72.5 kg (159 lb 13.3 oz)  LMP 2016 (Approximate)  BMI 25.03 kg/m2  APPEARANCE: Well nourished, well developed, in no acute distress.  ABDOMEN: Soft. " No tenderness or masses. No hernias.  PELVIC:   VULVA: Left sided Bartholin's abscess with aspirate of approximately 25cc of purulent odiferous fluid. Sent for culture. Monsel's applied to area of aspiration..    ASSESSMENT:  Bartholin's Gland abscess-- continue Bactrim send repeat culture      PLAN:  RTO 1 week for OB visit

## 2017-04-05 NOTE — TELEPHONE ENCOUNTER
Notify many pregnant women have used antibiotic and it is OK to use. If she would prefer a penicillin derivative we can but I was under the impression she could not do penicillins because of allergy but see it says no known drug allergy.

## 2017-04-08 LAB — BACTERIA SPEC AEROBE CULT: NORMAL

## 2017-04-09 ENCOUNTER — PATIENT MESSAGE (OUTPATIENT)
Dept: OBSTETRICS AND GYNECOLOGY | Facility: CLINIC | Age: 37
End: 2017-04-09

## 2017-04-09 ENCOUNTER — HOSPITAL ENCOUNTER (EMERGENCY)
Facility: HOSPITAL | Age: 37
Discharge: HOME | End: 2017-04-09
Attending: EMERGENCY MEDICINE
Payer: COMMERCIAL

## 2017-04-09 VITALS
HEIGHT: 66 IN | SYSTOLIC BLOOD PRESSURE: 138 MMHG | DIASTOLIC BLOOD PRESSURE: 86 MMHG | WEIGHT: 158.75 LBS | RESPIRATION RATE: 16 BRPM | OXYGEN SATURATION: 99 % | HEART RATE: 86 BPM | BODY MASS INDEX: 25.51 KG/M2 | TEMPERATURE: 98 F

## 2017-04-09 DIAGNOSIS — N75.1 BARTHOLIN'S GLAND ABSCESS: Primary | ICD-10-CM

## 2017-04-09 PROCEDURE — 56420 I&D BARTHOLINS GLAND ABSCESS: CPT

## 2017-04-09 PROCEDURE — 99283 EMERGENCY DEPT VISIT LOW MDM: CPT | Mod: 25

## 2017-04-09 PROCEDURE — 25000003 PHARM REV CODE 250: Performed by: PHYSICIAN ASSISTANT

## 2017-04-09 RX ORDER — LIDOCAINE HYDROCHLORIDE 10 MG/ML
10 INJECTION INFILTRATION; PERINEURAL
Status: COMPLETED | OUTPATIENT
Start: 2017-04-09 | End: 2017-04-09

## 2017-04-09 RX ADMIN — LIDOCAINE HYDROCHLORIDE 10 ML: 10 INJECTION, SOLUTION INFILTRATION; PERINEURAL at 04:04

## 2017-04-09 NOTE — DISCHARGE INSTRUCTIONS
Understanding Bartholin Cyst and Abscess    A woman has two Bartholin glands. They are located on the sides of the vaginal opening. These pea-sized glands make mucus. This mucus lubricates the outside part of the vagina (vulva). If a tube (duct) in one of these glands becomes blocked, it can cause a cyst or abscess.  What causes a Bartholin cyst or abscess?  A cyst can form when the duct of a Bartholin gland becomes blocked. The mucus cant come out of the gland. It builds up. If the cyst becomes infected, it may turn into an abscess.  A blockage in the gland can occur from an injury or an infection. It may also be linked to a sexually transmitted disease.  Symptoms of a Bartholin cyst or abscess  A Bartholin cyst starts as a small bump. It may cause no other symptoms. Or it may grow bigger. It can then cause swelling and pain. If an abscess forms, it can be very painful. You may have trouble walking, sitting, or having sex.  Treatment for a Bartholin cyst or abscess  A cyst that doesnt cause any symptoms may not need to be treated. It may go away on its own. But if you feel discomfort or pain, treatment options include:  · Medicine. Over-the-counter pain medicines can help. In some cases, you may need antibiotics if an infection is severe or a cyst or abscess comes back.  · Sitz bath. Soaking the genital area in warm water can ease pain.  · Drainage. Cutting open the cyst allows the fluid inside it to drain. This eases discomfort and pain. The doctor may insert a tube (catheter) to help with drainage. This catheter may need to stay in place for up to 3 weeks.  · Surgery. You may need to have the Bartholin glands removed if other treatments dont work.  When to call your healthcare provider  Call your healthcare provider right away if you have any of these:  · Fever of 100.4°F (38°C) or higher, or as directed  · Redness, swelling, or fluid leaking from the cyst that gets worse  · Pain that gets worse  · Symptoms  that dont get better, or get worse  · New symptoms   Date Last Reviewed: 6/1/2016  © 8532-0382 [employer]. [address] [place] [address], [place] [address]. All rights reserved. This information is not intended as a substitute for professional medical care. Always follow your healthcare professional's instructions.

## 2017-04-09 NOTE — ED PROVIDER NOTES
Encounter Date: 2017       History     Chief Complaint   Patient presents with    Bartholin's Cyst     states is unable to wait until MD appt tomorrow due to pain; 21 weeks pregnant; currently on bactrim x4 days     Review of patient's allergies indicates:  No Known Allergies  HPI Comments:  Crystal Hurley 36 y.o. female with pregnancy at 21 wks pregnancy presented to the ED with C/O continued left bartholin gland abscess. She is presently followed by Dr. Alves for this problem and the pregnancy. She states I&D of the areas x 4 with last incision Wednesday. Culture is showing E. coli and patient has been on Bactrim for 5 days. She described that the area improved however today the pain and swelling worsened.  She denies any fever, chills, abdominal pain, vaginal discharge or vaginal bleeding.      The history is provided by the patient.     Past Medical History:   Diagnosis Date    Anxiety     Arthritis 10/2014    right hip    Bartholin's cyst     Elevated BP     Miscarriage      Past Surgical History:   Procedure Laterality Date     SECTION      DILATION AND CURETTAGE OF UTERUS  2014     Family History   Problem Relation Age of Onset    Breast cancer Neg Hx     Colon cancer Neg Hx     Ovarian cancer Neg Hx      Social History   Substance Use Topics    Smoking status: Never Smoker    Smokeless tobacco: Never Used    Alcohol use No     Review of Systems   Constitutional: Negative for activity change, appetite change, chills and fever.   HENT: Negative for sore throat.    Cardiovascular: Negative for chest pain.   Gastrointestinal: Negative for abdominal pain, constipation, diarrhea, nausea and vomiting.   Genitourinary: Positive for vaginal pain. Negative for dysuria, flank pain, hematuria, pelvic pain, vaginal bleeding and vaginal discharge.   Musculoskeletal: Negative for arthralgias, back pain and myalgias.   Skin: Positive for wound. Negative for color change and rash.         Abscess of the left bartholin gland   Neurological: Negative for weakness.   Hematological: Does not bruise/bleed easily.       Physical Exam   Initial Vitals   BP Pulse Resp Temp SpO2   04/09/17 1536 04/09/17 1536 04/09/17 1536 04/09/17 1536 04/09/17 1536   156/86 100 20 98.1 °F (36.7 °C) 99 %     Physical Exam    Nursing note and vitals reviewed.  Constitutional: Vital signs are normal. She appears well-developed and well-nourished. She is cooperative.  Non-toxic appearance. She does not appear ill. No distress.   HENT:   Head: Normocephalic and atraumatic.   Eyes: Conjunctivae and lids are normal.   Neck: Neck supple. No rigidity.   Cardiovascular: Normal rate and regular rhythm.   Pulmonary/Chest: Breath sounds normal. No respiratory distress. She has no wheezes. She has no rhonchi.   Abdominal: Soft. Normal appearance and bowel sounds are normal. There is no tenderness. There is no rigidity and no guarding.   Gravid abdomen   Genitourinary:       Pelvic exam was performed with patient supine. There is tenderness on the left labia.   Genitourinary Comments: Circled region with 3 cm area of fluctuance and induration with small incision noted to the medial aspect; no active drainage or bleeding   Musculoskeletal: Normal range of motion.   Neurological: She is alert and oriented to person, place, and time. She has normal strength. GCS eye subscore is 4. GCS verbal subscore is 5. GCS motor subscore is 6.   Skin: Skin is warm, dry and intact. Abscess noted. No rash noted.   Psychiatric: She has a normal mood and affect. Her speech is normal and behavior is normal. Thought content normal.         ED Course   I & D - Incision and Drainage  Date/Time: 4/9/2017 4:54 PM  Location procedure was performed: [facility] EMERGENCY DEPARTMENT  Performed by: ELIA STRATTON  Authorized by: HARMONY NGUYEN JR   Assisting provider: HARMONY NGUYEN JR  Pre-operative diagnosis: bartholin gland abscess  Post-operative diagnosis:  bartholin gland abscess  Consent Done: Yes  Consent: Verbal consent obtained.  Consent given by: patient  Patient understanding: patient states understanding of the procedure being performed  Patient identity confirmed: verbally with patient  Type: abscess  Body area: anogenital  Location details: Bartholin's gland  Anesthesia: local infiltration    Anesthesia:  Anesthesia: local infiltration  Local Anesthetic: lidocaine 1% without epinephrine   Anesthetic total: 3 mL  Patient sedated: no  Scalpel size: 11  Incision type: single straight  Complexity: complex  Drainage: purulent  Drainage amount: copious  Wound treatment: incision,  drainage,  wound left open and  wound packed  Packing material: 1/4 in gauze  Complications: No  Specimens: No  Implants: No  Patient tolerance: Patient tolerated the procedure well with no immediate complications        Labs Reviewed - No data to display    Crystal Hurley 36 y.o. female with pregnancy at 21 wks pregnancy presented to the ED with C/O continued left bartholin gland abscess. She is presently followed by Dr. Alves for this problem and the pregnancy. She states I&D of the areas x 4 with last incision Wednesday. Culture is showing E. coli and patient has been on Bactrim for 5 days. She described that the area improved however today the pain and swelling worsened.  She denies any fever, chills, abdominal pain, vaginal discharge or vaginal bleeding.  ROS positive for abscess.  Physical exam reveals patient is well appearing in no obvious distress. Abdomen is nontender and gravid at 21 wks gestation. Circled region with 3 cm area of fluctuance and induration with small incision noted to the medial aspect; no active drainage or bleeding. No other lesions or vaginal bleeding noted.       DDX: bartholin gland cyst vs. abscess    ED management: moderate improvement of edema with I&D of the area with copious purulent drainage. Discussed word catheter placement and patient declined  at this time. Area packed to help prevent closure and secondary closure    Impression/Plan: Patient informed of diagnosis The encounter diagnosis was Bartholin's gland abscess.  Instructed to continue bactrim Patient will follow up with Primary.  Patient cautioned on when to return to ED.  Pt. Understands and agrees with current treatment plan                         ED Course     Clinical Impression:   The encounter diagnosis was Bartholin's gland abscess.          LUISITO Bo  04/09/17 3263

## 2017-04-09 NOTE — ED AVS SNAPSHOT
[facility]  [address]  [place] [address]               Crystal Hurley   2017  3:42 PM   ED    Description:  Female : 1980   Department:  [facility]           Your Care was Coordinated By:     Provider Role From To    Neri Pham Jr., MD Attending Provider 17 [phone] --    LUISITO Bo Physician Assistant 17 [phone] --      Reason for Visit     Bartholin's Cyst           Diagnoses this Visit        Comments    Bartholin's gland abscess    -  Primary       ED Disposition     None           To Do List           Follow-up Information     Follow up with Tiffany Alves MD. Go in 3 days.    Specialties:  Obstetrics, Obstetrics and Gynecology    Contact information:    [address] [place] [address]  [place] [address]  [phone]        [facility]     [facility] Nurse Care Line -  Assistance  Unless otherwise directed by your provider, please contact [facility], our nurse care line that is available for  assistance.     Registered nurses in the [facility] provide: appointment scheduling, clinical advisement, health education, and other advisory services.  Call: [phone] (toll free)               Medications           Message regarding Medications     Verify the changes and/or additions to your medication regime listed below are the same as discussed with your clinician today.  If any of these changes or additions are incorrect, please notify your healthcare provider.        These medications were administered today        Dose Freq    lidocaine HCL 10 mg/ml (1%) injection 10 mL 10 mL ED 1 Time    Sig: 10 mLs by Infiltration route ED 1 Time.    Class: Normal    Route: Infiltration    Cosign for Ordering: Required by Neri Pham Jr., MD           Verify that the below list of medications is an accurate representation of the medications you are currently taking.  If none reported, the list  "may be blank. If incorrect, please contact your healthcare provider. Carry this list with you in case of emergency.           Current Medications     DICLEGIS 10-10 mg TbEC Take 1 tablet by mouth 2 (two) times daily.    docusate sodium (COLACE) 50 MG capsule Take by mouth 2 (two) times daily.    ROOPA 250 mg/mL (1 mL) Oil Inject into the muscle once a week.     prenatal vit no.91-iron-FA-dha (PRENATAL + DHA) 28 mg iron- 975 mcg-200 mg Cmpk Take by mouth.    ranitidine (ZANTAC) 150 MG capsule Take 150 mg by mouth 2 (two) times daily as needed for Heartburn.    sulfamethoxazole-trimethoprim 400-80mg (BACTRIM) 400-80 mg per tablet Take 1 tablet by mouth 2 (two) times daily.           Clinical Reference Information           Your Vitals Were     BP Pulse Temp Resp Height Weight    156/86 (BP Location: Right arm, Patient Position: Sitting) 100 98.1 °F (36.7 °C) (Oral) 20 5' 6" (1.676 m) 72 kg (158 lb 11.7 oz)    Last Period SpO2 BMI          07/08/2016 (Approximate) 99% 25.62 kg/m2        Allergies as of 4/9/2017     No Known Allergies      Immunizations Administered on Date of Encounter - 4/9/2017     None      ED Micro, Lab, POCT     None      ED Imaging Orders     None        Discharge Instructions         Understanding Bartholin Cyst and Abscess    A woman has two Bartholin glands. They are located on the sides of the vaginal opening. These pea-sized glands make mucus. This mucus lubricates the outside part of the vagina (vulva). If a tube (duct) in one of these glands becomes blocked, it can cause a cyst or abscess.  What causes a Bartholin cyst or abscess?  A cyst can form when the duct of a Bartholin gland becomes blocked. The mucus cant come out of the gland. It builds up. If the cyst becomes infected, it may turn into an abscess.  A blockage in the gland can occur from an injury or an infection. It may also be linked to a sexually transmitted disease.  Symptoms of a Bartholin cyst or abscess  A Bartholin cyst " starts as a small bump. It may cause no other symptoms. Or it may grow bigger. It can then cause swelling and pain. If an abscess forms, it can be very painful. You may have trouble walking, sitting, or having sex.  Treatment for a Bartholin cyst or abscess  A cyst that doesnt cause any symptoms may not need to be treated. It may go away on its own. But if you feel discomfort or pain, treatment options include:  · Medicine. Over-the-counter pain medicines can help. In some cases, you may need antibiotics if an infection is severe or a cyst or abscess comes back.  · Sitz bath. Soaking the genital area in warm water can ease pain.  · Drainage. Cutting open the cyst allows the fluid inside it to drain. This eases discomfort and pain. The doctor may insert a tube (catheter) to help with drainage. This catheter may need to stay in place for up to 3 weeks.  · Surgery. You may need to have the Bartholin glands removed if other treatments dont work.  When to call your healthcare provider  Call your healthcare provider right away if you have any of these:  · Fever of 100.4°F (38°C) or higher, or as directed  · Redness, swelling, or fluid leaking from the cyst that gets worse  · Pain that gets worse  · Symptoms that dont get better, or get worse  · New symptoms   Date Last Reviewed: 6/1/2016  © 3281-6328 [employer]. [address] [place] [address]. All rights reserved. This information is not intended as a substitute for professional medical care. Always follow your healthcare professional's instructions.          Your Scheduled Appointments     Apr 12, 2017  8:30 AM CDT   Ultrasound with [facility]-Maternal Fetal Medicine ([facility])    [address]  [place] [address]               Apr 12, 2017 10:15 AM CDT   Routine Prenatal Visit with MD [place] - [facility]N ([facility])    [address], [address]  [address]  [place]  [place] [address]   [phone]               [facility] complies with applicable Federal civil rights laws and does not discriminate on the basis of race, color, national origin, age, disability, or sex.        Language Assistance Services     ATTENTION: Language assistance services are available, free of charge. Please call [phone].      ATENCIÓN: Si habla español, tiene a matias disposición servicios gratuitos de asistencia lingüística. Llame al [phone].     CHÚ Ý: N?u b?n nói Ti?ng Vi?t, có các d?ch v? h? tr? ngôn ng? mi?n phí dành cho b?n. G?i s? [phone].

## 2017-04-09 NOTE — ED NOTES
Bartholin cyst, is currently taking bactrim for same.  Had Word catheter placed by OB MD but catheter came out and pain has continued.  21 weeks gestation, denies vaginal bleeding, dysuria or abdominal pain

## 2017-04-10 ENCOUNTER — PATIENT MESSAGE (OUTPATIENT)
Dept: OBSTETRICS AND GYNECOLOGY | Facility: CLINIC | Age: 37
End: 2017-04-10

## 2017-04-10 NOTE — TELEPHONE ENCOUNTER
Patient states she went to ED yesterday and had the gland drained.  States she is good to just keep appointment with  on Wednesday

## 2017-04-12 ENCOUNTER — OFFICE VISIT (OUTPATIENT)
Dept: MATERNAL FETAL MEDICINE | Facility: HOSPITAL | Age: 37
End: 2017-04-12
Attending: OBSTETRICS & GYNECOLOGY
Payer: COMMERCIAL

## 2017-04-12 VITALS
DIASTOLIC BLOOD PRESSURE: 80 MMHG | SYSTOLIC BLOOD PRESSURE: 138 MMHG | WEIGHT: 158 LBS | BODY MASS INDEX: 25.39 KG/M2 | HEIGHT: 66 IN | HEART RATE: 102 BPM

## 2017-04-12 DIAGNOSIS — O09.892 H/O PRETERM DELIVERY, CURRENTLY PREGNANT, SECOND TRIMESTER: ICD-10-CM

## 2017-04-12 DIAGNOSIS — O16.9 HYPERTENSION DURING PREGNANCY, ANTEPARTUM, UNSPECIFIED HYPERTENSION IN PREGNANCY TYPE: ICD-10-CM

## 2017-04-12 DIAGNOSIS — Z36.3 ENCOUNTER FOR ROUTINE SCREENING FOR MALFORMATION USING ULTRASONICS: ICD-10-CM

## 2017-04-12 DIAGNOSIS — O09.522 AMA (ADVANCED MATERNAL AGE) MULTIGRAVIDA 35+, SECOND TRIMESTER: ICD-10-CM

## 2017-04-12 DIAGNOSIS — Z15.89 COMPOUND HETEROZYGOUS MTHFR MUTATION C677T/A1298C: ICD-10-CM

## 2017-04-12 PROCEDURE — 76811 OB US DETAILED SNGL FETUS: CPT | Mod: 26,,, | Performed by: OBSTETRICS & GYNECOLOGY

## 2017-04-12 PROCEDURE — 76816 OB US FOLLOW-UP PER FETUS: CPT

## 2017-04-12 PROCEDURE — 76817 TRANSVAGINAL US OBSTETRIC: CPT | Mod: 26,,, | Performed by: OBSTETRICS & GYNECOLOGY

## 2017-04-12 PROCEDURE — 99202 OFFICE O/P NEW SF 15 MIN: CPT | Mod: 25,,, | Performed by: OBSTETRICS & GYNECOLOGY

## 2017-04-12 NOTE — PROGRESS NOTES
Chief complaint: AMA    Provider requesting consultation: Dr. Alves    AMA:  Materni T21 negative     IUI for this index pregnancy    HTN (no meds) per patient's prenatal visits: 144/86, 160/100, 148/82  -patient denies HTN    OB hx:  1. , 36 wks, male, LTCS, PTL   2. , chemical pregnancy  3, 2014, SAB with D & C    Ultrasound performed: see report for details     Counseling: AMA  Today I counseled the patient on the relationship between maternal age and genetic aneuploidy.  We discussed the risks and benefits of screening tests versus definitive genetic testing (amniocentesis). She was counseled about her specific age related risk of Down Syndrome. We discussed the limitations of ultrasound in the definitive diagnosis of Down Syndrome and we discussed amniocentesis as providing definitive diagnosis.  I quoted the patient a 1 in 900 procedure related risk of fetal loss with genetic amniocenetesis.  I also discussed with the patient the option of non-invasive prenatal testing (NIPT) (ex. Materni T21) including the sensitivity and specificity of the test. Patient already had Materni T21 which was negative.   Her questions were answered and after today's consultation she declined amniocentesis.     Counseling: hx of PTL/PTD  In patients with a prior history of spontaneous  delivery prior to 37 weeks gestation, ACOG currently recommends that these patients be offered progesterone therapy for the prevention of recurrent  birth.  This is usually accomplished via weekly 250mg intramuscular injections of 17-alpha-hydroxyprogesterone caproate begining at 16-20 weeks estimated gestational age.  Patient is already receiving weekly 17-OHP injections.     In patients at risk for recurrent  birth, surveilance for cervical length shortening with periodic cervical length measurements may help identify those in need for tocolytic therapy and administration of corticosteroids.  Patient had transvaginal  sonographic evaluation of cervix today which was wnl. Recommend a f/u sono of cervix in 2 to 3 wks and if reassuring, no further cervical length assessments routinely performed once 24 wks gestation. Patient's questions were answered.     Counseling: HTN  Today I counseled the patient on maternal/fetal risks associated with CHTN during pregnancy including fetal growth restriction, miscarriage,  delivery, development of superimposed preeclampsia, and eclampsia. She was counseled on the recommendations for blood pressure control, serial ultrasound for fetal growth assessment, and timing of delivery. I also counseled her on the recommendation for aspirin 81 mg daily which may decrease her risk of developing superimposed preeclampsia.      I spent about 25 minutes in consultation separate from ultrasound     Recommendations/follow up:  1. AMA   Recommendations from our MFM group at [facility]:  -For women who are of advanced maternal age at the time of delivery we recommend a follow up fetal ultrasound at 32-34 weeks for interval fetal growth and well being (biophysical profile).  -In addition, because of the association of increased stillbirth noted in women over the age of 40 at time of delivery, we recommend weekly fetal surveillance (ex. NST/MVP) starting at 35 weeks until delivery.    -In this population of women over the age of 40 at time of delivery, we do not recommend to allow the pregnancy to proceed beyond 41 weeks gestation.    2. Hx of PTD      -continue with 17-OHP injections weekly not to proceed beyond 36w 6d gestation       -recommend a f/u transvaginal sonographic evaluation of cervix in 2 to 3 wks and if reassuring, no further routine cervical length assessment necessary once           24 wks gestation              (this sono can be performed in your office and notify MFM if cervical length <2.5cm). If you desire MFM to perform sono, please contact our office.     3.  CHTN (no meds) per prenatal  blood pressures   Listed below are recommendations for HTN in pregnancy  - Because of the increased risk of preeclampsia in patients with underlying chronic hypertension we recommend starting aspirin 81mg daily beginning in the late first trimester.    -Because of the increased risk of preeclampsia in patients with chronic hypertension we recommend obtaining a baseline 24 hour urine protein evaluation or a baseline urine protein to creatinine ratio.    -Secondary to the higher incidence of intrauterine growth restriction (IUGR) in patients with chronic hypertension, we recommend periodic (every 4-6 weeks) fetal growth assessments.                We will schedule patient for a f/u sono in 6 wks with MFM    -Continued close observation of patient's blood pressures.  Care should be taken also to avoid hypotension in pregnancy as this can be associated with uteroplacental insufficiency.  Currently we recommend:   -For women with a systolic BP less than 160mmHg or a diastolic BP less than 105mmHg, and no evidence of end organ damage, no medication treatment needed   -For women with a systolic BP persistently greater than or equal to 160mmHg or a diastolic BP greater than or equal to 105mmHg, antihypertensive medication is                   recommended with goal to be between 120-160mmHg systolic and 80-105mmHg diastolic.     -We also recommend fetal surveillance for women with chronic hypertension that are requiring medications.  We recommend twice weekly fetal NSTs with once weekly MVP starting at 32 weeks until delivery.    -In regards to timing of delivery we recommend to follow the guidelines from ACOG Committee Opinion Number 560 from April of 2013.  The committee opinion recommends to consider delivery in the following:    -Women on no medications: at 38 0/7 - 39 6/7 weeks EGA  -Women that are well controlled on medications: at 37 0/7 - 39 6/7 weeks EGA  -Women that are difficult to control on medications: at 36  0/7 - 37 6/7 weeks EGA.    Further recommendations can be provided with advancing EGA and per clinical status

## 2017-04-13 ENCOUNTER — TELEPHONE (OUTPATIENT)
Dept: OBSTETRICS AND GYNECOLOGY | Facility: CLINIC | Age: 37
End: 2017-04-13

## 2017-04-13 ENCOUNTER — ROUTINE PRENATAL (OUTPATIENT)
Dept: OBSTETRICS AND GYNECOLOGY | Facility: CLINIC | Age: 37
End: 2017-04-13
Payer: COMMERCIAL

## 2017-04-13 VITALS
SYSTOLIC BLOOD PRESSURE: 140 MMHG | BODY MASS INDEX: 26.15 KG/M2 | WEIGHT: 162.06 LBS | DIASTOLIC BLOOD PRESSURE: 82 MMHG

## 2017-04-13 DIAGNOSIS — Z34.82 ENCOUNTER FOR SUPERVISION OF OTHER NORMAL PREGNANCY IN SECOND TRIMESTER: ICD-10-CM

## 2017-04-13 DIAGNOSIS — O16.9 HYPERTENSION DURING PREGNANCY, ANTEPARTUM, UNSPECIFIED HYPERTENSION IN PREGNANCY TYPE: ICD-10-CM

## 2017-04-13 DIAGNOSIS — Z87.51 HISTORY OF PRETERM DELIVERY: Primary | ICD-10-CM

## 2017-04-13 DIAGNOSIS — Z3A.21 21 WEEKS GESTATION OF PREGNANCY: ICD-10-CM

## 2017-04-13 DIAGNOSIS — Z98.891 HISTORY OF C-SECTION: ICD-10-CM

## 2017-04-13 PROCEDURE — 0502F SUBSEQUENT PRENATAL CARE: CPT | Mod: S$GLB,,, | Performed by: OBSTETRICS & GYNECOLOGY

## 2017-04-13 PROCEDURE — 99999 PR PBB SHADOW E&M-EST. PATIENT-LVL II: CPT | Mod: PBBFAC,,, | Performed by: OBSTETRICS & GYNECOLOGY

## 2017-04-13 RX ORDER — SULFAMETHOXAZOLE AND TRIMETHOPRIM 400; 80 MG/1; MG/1
1 TABLET ORAL 2 TIMES DAILY
Qty: 20 TABLET | Refills: 0 | Status: SHIPPED | OUTPATIENT
Start: 2017-04-13 | End: 2017-04-23

## 2017-04-13 NOTE — TELEPHONE ENCOUNTER
Sorry but patient states she only has one pill of Bactrim left and she wanted to make sure it was called in before we leave for Easter holidays. Was unsure how you wanted it called in. Patient did make a comment stating a low dose of Bactrim was suppose to be sent to pharmacy. Pharmacy updated, please advise.

## 2017-04-13 NOTE — PROGRESS NOTES
Bartholin's gland has decreased in size and not as indurated. Continue Bactrm. Cervical length in 2 weeks

## 2017-04-13 NOTE — TELEPHONE ENCOUNTER
----- Message from Eileen Stone sent at 4/13/2017  2:42 PM CDT -----  Contact: [phone]  Patient states the pharm. Did not order for bactrim

## 2017-04-19 ENCOUNTER — PATIENT MESSAGE (OUTPATIENT)
Dept: OBSTETRICS AND GYNECOLOGY | Facility: CLINIC | Age: 37
End: 2017-04-19

## 2017-04-19 ENCOUNTER — OFFICE VISIT (OUTPATIENT)
Dept: OBSTETRICS AND GYNECOLOGY | Facility: CLINIC | Age: 37
End: 2017-04-19
Payer: COMMERCIAL

## 2017-04-19 VITALS
BODY MASS INDEX: 25.86 KG/M2 | SYSTOLIC BLOOD PRESSURE: 128 MMHG | WEIGHT: 160.94 LBS | DIASTOLIC BLOOD PRESSURE: 82 MMHG | HEIGHT: 66 IN

## 2017-04-19 DIAGNOSIS — N75.1 BARTHOLIN'S GLAND ABSCESS: Primary | ICD-10-CM

## 2017-04-19 PROCEDURE — 99999 PR PBB SHADOW E&M-EST. PATIENT-LVL II: CPT | Mod: PBBFAC,,, | Performed by: OBSTETRICS & GYNECOLOGY

## 2017-04-19 PROCEDURE — 99213 OFFICE O/P EST LOW 20 MIN: CPT | Mod: S$GLB,,, | Performed by: OBSTETRICS & GYNECOLOGY

## 2017-04-19 PROCEDURE — 87186 SC STD MICRODIL/AGAR DIL: CPT

## 2017-04-19 PROCEDURE — 87077 CULTURE AEROBIC IDENTIFY: CPT

## 2017-04-19 PROCEDURE — 87070 CULTURE OTHR SPECIMN AEROBIC: CPT

## 2017-04-19 PROCEDURE — 1160F RVW MEDS BY RX/DR IN RCRD: CPT | Mod: S$GLB,,, | Performed by: OBSTETRICS & GYNECOLOGY

## 2017-04-19 NOTE — PROGRESS NOTES
Pre-Vulvar Abscess Drainage Counseling:  The patient was informed of the risk of bleeding, pain and infection.  She was counseled on the conservative treatment of a vulvar abscess as well as operative outpatient management, and she agrees to proceed with in-office incision and drainage.    Exam:  There is a 4cm abscess of the left labium majus.  There is not surrounding cellulitis.  Normal hair distribution.  Adequate perineal body and normal urethral meatus.  Vaginal moist and well-rugated.    Procedure:  A time out was performed. The area was cleaned with betadine and an 18 gauge needle was used to drain fluid from cyst and sent for culture.The patient tolerated the procedure well.    Assessment:  Drainage of a vulvar abscess.    Plan: pending culture  Continue antibiotics but take 2 tablets BID

## 2017-04-22 LAB — BACTERIA SPEC AEROBE CULT: NORMAL

## 2017-04-23 ENCOUNTER — HOSPITAL ENCOUNTER (EMERGENCY)
Facility: HOSPITAL | Age: 37
Discharge: HOME | End: 2017-04-23
Payer: COMMERCIAL

## 2017-04-23 ENCOUNTER — PATIENT MESSAGE (OUTPATIENT)
Dept: OBSTETRICS AND GYNECOLOGY | Facility: CLINIC | Age: 37
End: 2017-04-23

## 2017-04-23 VITALS
BODY MASS INDEX: 25.71 KG/M2 | SYSTOLIC BLOOD PRESSURE: 135 MMHG | OXYGEN SATURATION: 99 % | DIASTOLIC BLOOD PRESSURE: 91 MMHG | HEART RATE: 97 BPM | WEIGHT: 160 LBS | TEMPERATURE: 98 F | HEIGHT: 66 IN | RESPIRATION RATE: 18 BRPM

## 2017-04-23 DIAGNOSIS — N75.1 ABSCESS OF BARTHOLIN'S GLAND: Primary | ICD-10-CM

## 2017-04-23 DIAGNOSIS — N75.0 BARTHOLIN CYST: ICD-10-CM

## 2017-04-23 PROCEDURE — 99283 EMERGENCY DEPT VISIT LOW MDM: CPT | Mod: 25

## 2017-04-23 PROCEDURE — 56420 I&D BARTHOLINS GLAND ABSCESS: CPT | Mod: LT

## 2017-04-23 RX ORDER — ACETAMINOPHEN 500 MG
1000 TABLET ORAL 3 TIMES DAILY PRN
Qty: 30 TABLET | Refills: 0 | Status: ON HOLD
Start: 2017-04-23 | End: 2017-07-21 | Stop reason: HOSPADM

## 2017-04-23 NOTE — ED NOTES
"Bartholin cyst to left labia, has been aspirated multiple times and had word catheter placed that "popped out".  Denies fever.    "

## 2017-04-23 NOTE — ED AVS SNAPSHOT
[facility]  [address]  [place] [address]               Crystal Hurley   2017  4:15 PM   ED    Description:  Female : 1980   Department:  [facility]           Your Care was Coordinated By:     Provider Role From To    Myriam Flannery NP Nurse Practitioner 17 1617 --      Reason for Visit     Bartholin's Cyst           Diagnoses this Visit        Comments    Bartholin cyst    -  Primary       ED Disposition     ED Disposition Condition Comment    Discharge             To Do List           Follow-up Information     Follow up with Dusty Navarro MD In 1 week(s).    Specialty:  Family Medicine    Contact information:    [address] [facility]  [address]  [place] [address]  [phone]          Follow up with Tiffany Alves MD .    Specialties:  Obstetrics, Obstetrics and Gynecology    Contact information:    [address] [place] [id number] [phone]         These Medications        Disp Refills Start End    acetaminophen (TYLENOL) 500 MG tablet 30 tablet 0 2017     Take 2 tablets (1,000 mg total) by mouth 3 (three) times daily as needed for Pain. - Oral    Pharmacy: [facility]/pharmacy #[id number] - [place] - [address] AT [facility] Ph #: [phone]         [facility]     [facility] Care [facility] - 24 Assistance  Unless otherwise directed by your provider, please contact [facility], our nurse care line that is available for  assistance.     Registered nurses in the [facility] provide: appointment scheduling, clinical advisement, health education, and other advisory services.  Call: [phone] (toll free)               Medications           Message regarding Medications     Verify the changes and/or additions to your medication regime listed below are the same as discussed with your clinician today.  If any of these changes or additions are incorrect,  "please notify your healthcare provider.        START taking these NEW medications        Refills    acetaminophen (TYLENOL) 500 MG tablet 0    Sig: Take 2 tablets (1,000 mg total) by mouth 3 (three) times daily as needed for Pain.    Class: No Print    Route: Oral      These medications were administered today        Dose Freq    LET (lidocaine-epinephrine-tetracaine) 4 %-1:1,000 -0.5 % solution 5 mL 5 mL Once    Sig: Apply 5 mLs topically once.    Class: Normal    Route: Topical (Top)           Verify that the below list of medications is an accurate representation of the medications you are currently taking.  If none reported, the list may be blank. If incorrect, please contact your healthcare provider. Carry this list with you in case of emergency.           Current Medications     acetaminophen (TYLENOL) 500 MG tablet Take 2 tablets (1,000 mg total) by mouth 3 (three) times daily as needed for Pain.    DICLEGIS 10-10 mg TbEC Take 1 tablet by mouth 2 (two) times daily.    LET (lidocaine-epinephrine-tetracaine) 4 %-1:1,000 -0.5 % solution 5 mL Apply 5 mLs topically once.    ROOPA 250 mg/mL (1 mL) Oil Inject into the muscle once a week.     ROOPA Oil 250 mg Inject 1 mL (250 mg total) into the muscle one time.    prenatal vit no.91-iron-FA-dha (PRENATAL + DHA) 28 mg iron- 975 mcg-200 mg Cmpk Take by mouth.    ranitidine (ZANTAC) 150 MG capsule Take 150 mg by mouth 2 (two) times daily as needed for Heartburn.    sulfamethoxazole-trimethoprim 400-80mg (BACTRIM) 400-80 mg per tablet Take 1 tablet by mouth 2 (two) times daily.           Clinical Reference Information           Your Vitals Were     BP Pulse Temp Resp Height Weight    161/101 (BP Location: Right arm, Patient Position: Sitting) 97 97.9 °F (36.6 °C) (Oral) 18 5' 6" (1.676 m) 72.6 kg (160 lb)    Last Period SpO2 BMI          07/08/2016 (Approximate) 99% 25.82 kg/m2        Allergies as of 4/23/2017     No Known Allergies      Immunizations Administered on " Date of Encounter - 4/23/2017     None      ED Micro, Lab, POCT     None      ED Imaging Orders     None        Discharge Instructions           Bartholins Cyst: Common Treatments    The Bartholins glands are a pair of very small glands found inside the labia (vaginal lips). (There is one on either side.) The glands produce fluid to help keep the vagina moist. When the opening of a Bartholins gland becomes blocked, the gland may swell and form a cyst. The cyst may vary in size from small to large (1 to 3 cm in size). It may feel like a firm lump within the labia.  Treatment depends on various factors. These include the size of the cyst, whether it causes symptoms, and whether its infected. Small and/or uninfected cysts dont usually need treatment. Large cysts and those that are painful or infected will likely need treatment. Below are some common treatments that may be done:  · Incision and drainage: With this procedure, the area over the cyst is numbed. The cyst is cut and drained. Often, a thin tube with a balloon on the end (called a Word catheter) is then inserted into the empty cyst. This allows for further drainage of fluid. The catheter is left in place for 4 to 6 weeks. It is then removed by the healthcare provider.  · Marsupialization: With this procedure, the area over the cyst is numbed. The cyst is cut and drained. The edges of the skin are then stitched to create a small opening that will allow for further drainage of fluid.  Note: If you have recurrent cysts, other treatments may be needed. Your provider can tell you more about these options.  If your cyst is infected, antibiotics will likely be prescribed. Most infections of Bartholins cysts are due to bacteria that are normally present in the vagina. Sometimes, the bacteria may have been passed to you during sex. This is called a sexually transmitted disease (STD). A test (culture) of the fluid can confirm if you have an STD.  Home  care  Medicines  · If antibiotics are prescribed, be sure to take them exactly as directed. Dont stop taking the medicine, even if you start to feel better. Note: If the cause of your infection is an STD, any sexual partners you have will also need to be tested and treated.  · If you have pain, use over-the-counter pain medicine as directed. If needed, stronger pain medicines can be prescribed.   General care  · To help relieve discomfort, you may be advised to take sitz baths for the next few days. For this, you soak in bath filled with 2 to 3 inches of warm water for 10 to 15 minutes, a few times a day.  · Avoid having sex until the cyst has healed. If the cause of your infection is an STD, also avoid having sex until you and any partners you have are done with treatment.  Follow-up care  Follow up with your healthcare provider as directed. Be sure to keep all appointments. These are needed to ensure that you are recovering well after your treatment. If you have a catheter, your provider will let you know when to return to have it removed.  When to seek medical advice  Call your healthcare provider right away if any of these occur:  · Fever does not go down or worsens  · Increased redness, pain, swelling, or foul-smelling drainage from the cyst or the area around it  · Pain in the lower abdomen   · New rash or joint pain  · Catheter falls out before the scheduled time to remove it (only if a Word catheter was placed)  Date Last Reviewed: 6/11/2015  © 6044-2594 [employer]. [address] [place] [address], [place] [address]. All rights reserved. This information is not intended as a substitute for professional medical care. Always follow your healthcare professional's instructions.          Your Scheduled Appointments     Apr 26, 2017  1:30 PM CDT   Procedure with ULTRASOUND, JOHANNA Mosher - OB/GYN ([facility])    [address] [facility], [address]  5th [address] [place] [address] [phone]             May 10, 2017 10:15 AM CDT   Routine Prenatal Visit with MD [place] - OB/GYN ([facility])    [address]  [address]th [address]  [place] [address] [phone]            May 24, 2017  8:00 AM CDT   Ultrasound with ULTRASOUND, VINCENT Mosher-Maternal Fetal Medicine ([facility])    [address] [facility] [address] [facility]  [place] [address]                  [facility] complies with applicable Federal civil rights laws and does not discriminate on the basis of race, color, national origin, age, disability, or sex.        Language Assistance Services     ATTENTION: Language assistance services are available, free of charge. Please call [phone].      ATENCIÓN: Si habla gaelañol, tiene a matias disposición servicios gratuitos de asistencia lingüística. Llame al [phone].     CHÚ Ý: N?u b?n nói Ti?ng Vi?t, có các d?ch v? h? tr? ngôn ng? mi?n phí dành cho b?n. G?i s? [phone].

## 2017-04-23 NOTE — ED PROVIDER NOTES
Encounter Date: 2017       History     Chief Complaint   Patient presents with    Bartholin's Cyst     recurrent through pregnancy     Review of patient's allergies indicates:  No Known Allergies  HPI Comments: Pt reports recurrent left sided bartholin abscess for several months since pregnancy. Pt 24 weeks pregnant. Denies associated fever, chills, oor body aches. Seen by OB last Wednesday for aspiration and cultures.  Abscess has returned and is larger and painful.  No associated drainage.  Recently completed treatment of Bactrim. Has follow up appt with OB on Wednesday. Is requesting aspiration again today.    Patient is a 36 y.o. female presenting with the following complaint: abscess. The history is provided by the patient.   Abscess    This is a new problem. The abscess is present on the genitalia. Pertinent negatives include no fever.     Past Medical History:   Diagnosis Date    Anxiety     Arthritis 10/2014    right hip    Bartholin's cyst     Elevated BP     Miscarriage      Past Surgical History:   Procedure Laterality Date     SECTION      DILATION AND CURETTAGE OF UTERUS  2014     Family History   Problem Relation Age of Onset    Breast cancer Neg Hx     Colon cancer Neg Hx     Ovarian cancer Neg Hx      Social History   Substance Use Topics    Smoking status: Never Smoker    Smokeless tobacco: Never Used    Alcohol use No     Review of Systems   Constitutional: Negative.  Negative for chills and fever.   Respiratory: Negative.    Cardiovascular: Negative.    Gastrointestinal: Negative.    Genitourinary: Positive for vaginal pain. Negative for hematuria, menstrual problem, pelvic pain, vaginal bleeding and vaginal discharge.        Pain to L labia   Neurological: Negative.    Hematological: Negative.    Psychiatric/Behavioral: Negative.    All other systems reviewed and are negative.      Physical Exam   Initial Vitals   BP Pulse Resp Temp SpO2   17 1613 17  1613 04/23/17 1613 04/23/17 1613 04/23/17 1613   161/101 97 18 97.9 °F (36.6 °C) 99 %     Physical Exam    Nursing note and vitals reviewed.  Constitutional: She appears well-developed and well-nourished. No distress.   HENT:   Head: Normocephalic and atraumatic.   Eyes: Conjunctivae are normal.   Neck: Normal range of motion. Neck supple.   Cardiovascular: Normal rate.   Pulmonary/Chest: No respiratory distress.   Abdominal:   pregnant   Genitourinary:   Genitourinary Comments: Large bartholin cyst to L labia   Musculoskeletal: Normal range of motion.   Neurological: She is alert and oriented to person, place, and time.   Skin: Skin is warm and dry. No rash noted. No erythema.   Psychiatric: She has a normal mood and affect. Her behavior is normal. Judgment and thought content normal.         ED Course   I & D - Incision and Drainage  Date/Time: 4/23/2017 4:34 PM  Location procedure was performed: [facility] EMERGENCY DEPARTMENT  Performed by: AKBAR JACKSON  Authorized by: AKBAR JACKSON   Assisting provider: AKBAR JACKSON  Pre-operative diagnosis: bartholin's cyst  Post-operative diagnosis: bartholin's cyst  Consent Done: Yes  Consent: Verbal consent obtained.  Risks and benefits: risks, benefits and alternatives were discussed  Consent given by: patient  Patient understanding: patient states understanding of the procedure being performed  Patient consent: the patient's understanding of the procedure matches consent given  Type: cyst  Body area: anogenital  Anesthesia: see MAR for details    Anesthesia:  Anesthesia: see MAR for details  Local Anesthetic: topical anesthetic   Description of findings: bartholin's cyst to L labia   Scalpel size: 18G needle.  Complexity: simple  Drainage: purulent  Wound treatment: drainage  Packing material: none  Technical procedures used: Aspiration of 30mL purelent dranage from bartholin's cyst  Significant surgical tasks conducted by the assistant(s): none  Complications:  "No  Estimated blood loss (mL): 0  Specimens: No  Implants: No  Patient tolerance: Patient tolerated the procedure well with no immediate complications        Labs Reviewed - No data to display          Medical Decision Making:   History:   Old Medical Records: I decided to obtain old medical records.  Old Records Summarized: records from clinic visits.       <> Summary of Records: Previous aspiration of same abscess.  Unable to keep Word catheter in on previous attempts.  OB does not want to do marsupalization procedure while pregnant  Initial Assessment:   Pt reports recurrent bartholin abscess for several months since pregnancy. Pt 24 weeks pregnant. Denies fever, chills, body aches. Seen by OB last Wednesday for aspiration and cultures. Recently completed treatment of Bactrim. Has follow up appt with OB on Wednesday. Is requesting aspiration of cyst today. Golf ball sized cyst noted to L labial wall. Pt states "hole is too big for word's catheter and it increases the pain".   Differential Diagnosis:   Bartholin cyst  ED Management:  LET applied to skin. 18G needle used to aspirate approx 30cc of brown drainage from L labia. Pt reports relieve after aspiration. Tolerated well.    BP elevated on today's visit, pt asymptomatic reports pain. Will follow up with OB on visit tomorrow regarding elevated reading today.              Attending Attestation:     Physician Attestation Statement for NP/PA:   I have conducted a face to face encounter with this patient in addition to the NP/PA, due to NP/PA Request    Other NP/PA Attestation Additions:    History of Present Illness: Recurrent left Bartholin's abscess    Medical Decision Making: Aspiration with drainage of copious amounts of pus.  Follow up with OB for further care.                 ED Course     Clinical Impression:   The primary encounter diagnosis was Abscess of Bartholin's gland. A diagnosis of Bartholin cyst was also pertinent to this visit.          Myriam" KELSEY Flannery, ALANA  04/23/17 1748       Myriam Flannery, ALANA  04/25/17 1208       Myriam Flannery NP  04/29/17 1549       Lucretia Contreras MD  04/29/17 1600

## 2017-04-23 NOTE — DISCHARGE INSTRUCTIONS
Bartholins Cyst: Common Treatments    The Bartholins glands are a pair of very small glands found inside the labia (vaginal lips). (There is one on either side.) The glands produce fluid to help keep the vagina moist. When the opening of a Bartholins gland becomes blocked, the gland may swell and form a cyst. The cyst may vary in size from small to large (1 to 3 cm in size). It may feel like a firm lump within the labia.  Treatment depends on various factors. These include the size of the cyst, whether it causes symptoms, and whether its infected. Small and/or uninfected cysts dont usually need treatment. Large cysts and those that are painful or infected will likely need treatment. Below are some common treatments that may be done:  · Incision and drainage: With this procedure, the area over the cyst is numbed. The cyst is cut and drained. Often, a thin tube with a balloon on the end (called a Word catheter) is then inserted into the empty cyst. This allows for further drainage of fluid. The catheter is left in place for 4 to 6 weeks. It is then removed by the healthcare provider.  · Marsupialization: With this procedure, the area over the cyst is numbed. The cyst is cut and drained. The edges of the skin are then stitched to create a small opening that will allow for further drainage of fluid.  Note: If you have recurrent cysts, other treatments may be needed. Your provider can tell you more about these options.  If your cyst is infected, antibiotics will likely be prescribed. Most infections of Bartholins cysts are due to bacteria that are normally present in the vagina. Sometimes, the bacteria may have been passed to you during sex. This is called a sexually transmitted disease (STD). A test (culture) of the fluid can confirm if you have an STD.  Home care  Medicines  · If antibiotics are prescribed, be sure to take them exactly as directed. Dont stop taking the medicine, even if you start to feel  better. Note: If the cause of your infection is an STD, any sexual partners you have will also need to be tested and treated.  · If you have pain, use over-the-counter pain medicine as directed. If needed, stronger pain medicines can be prescribed.   General care  · To help relieve discomfort, you may be advised to take sitz baths for the next few days. For this, you soak in bath filled with 2 to 3 inches of warm water for 10 to 15 minutes, a few times a day.  · Avoid having sex until the cyst has healed. If the cause of your infection is an STD, also avoid having sex until you and any partners you have are done with treatment.  Follow-up care  Follow up with your healthcare provider as directed. Be sure to keep all appointments. These are needed to ensure that you are recovering well after your treatment. If you have a catheter, your provider will let you know when to return to have it removed.  When to seek medical advice  Call your healthcare provider right away if any of these occur:  · Fever does not go down or worsens  · Increased redness, pain, swelling, or foul-smelling drainage from the cyst or the area around it  · Pain in the lower abdomen   · New rash or joint pain  · Catheter falls out before the scheduled time to remove it (only if a Word catheter was placed)  Date Last Reviewed: 6/11/2015  © 2830-7069 [employer]. [address] [place] [address], [place] [address]. All rights reserved. This information is not intended as a substitute for professional medical care. Always follow your healthcare professional's instructions.

## 2017-04-24 ENCOUNTER — PATIENT MESSAGE (OUTPATIENT)
Dept: OBSTETRICS AND GYNECOLOGY | Facility: CLINIC | Age: 37
End: 2017-04-24

## 2017-04-24 RX ORDER — SULFAMETHOXAZOLE AND TRIMETHOPRIM 800; 160 MG/1; MG/1
1 TABLET ORAL 2 TIMES DAILY
Qty: 28 TABLET | Refills: 0 | Status: SHIPPED | OUTPATIENT
Start: 2017-04-24 | End: 2017-05-08

## 2017-04-24 NOTE — TELEPHONE ENCOUNTER
I would prefer 14 days of the Bactrim but at the higher dose (I will send RX). The chance of sepsis is low since we are treating her

## 2017-04-26 ENCOUNTER — OFFICE VISIT (OUTPATIENT)
Dept: OBSTETRICS AND GYNECOLOGY | Facility: CLINIC | Age: 37
End: 2017-04-26
Payer: COMMERCIAL

## 2017-04-26 ENCOUNTER — PATIENT MESSAGE (OUTPATIENT)
Dept: OBSTETRICS AND GYNECOLOGY | Facility: CLINIC | Age: 37
End: 2017-04-26

## 2017-04-26 VITALS
DIASTOLIC BLOOD PRESSURE: 88 MMHG | SYSTOLIC BLOOD PRESSURE: 148 MMHG | WEIGHT: 163.81 LBS | HEIGHT: 66 IN | BODY MASS INDEX: 26.33 KG/M2

## 2017-04-26 DIAGNOSIS — O16.9 HYPERTENSION IN PREGNANCY, ANTEPARTUM: Primary | ICD-10-CM

## 2017-04-26 DIAGNOSIS — Z87.51 HISTORY OF PRETERM DELIVERY: ICD-10-CM

## 2017-04-26 DIAGNOSIS — N75.1 BARTHOLIN'S GLAND ABSCESS: Primary | ICD-10-CM

## 2017-04-26 PROCEDURE — 56420 I&D BARTHOLINS GLAND ABSCESS: CPT | Mod: S$GLB,,, | Performed by: OBSTETRICS & GYNECOLOGY

## 2017-04-26 PROCEDURE — 99999 PR PBB SHADOW E&M-EST. PATIENT-LVL II: CPT | Mod: PBBFAC,,, | Performed by: OBSTETRICS & GYNECOLOGY

## 2017-04-26 PROCEDURE — 99499 UNLISTED E&M SERVICE: CPT | Mod: S$GLB,,, | Performed by: OBSTETRICS & GYNECOLOGY

## 2017-04-26 PROCEDURE — 76817 TRANSVAGINAL US OBSTETRIC: CPT | Mod: S$GLB,,, | Performed by: OBSTETRICS & GYNECOLOGY

## 2017-04-26 NOTE — PROGRESS NOTES
Pre-Vulvar Abscess I&D Counseling:  The patient was informed of the risk of bleeding, pain and infection.  She was counseled on the conservative treatment of a vulvar abscess as well as operative outpatient management, and she agrees to proceed with in-office incision and drainage.    Exam:  There is a 3cm abscess of the left labium majus.  There is not surrounding cellulitis.  Normal hair distribution.  Adequate perineal body and normal urethral meatus.  Vaginal moist and well-rugated.    Procedure:  A time out was performed.  The base of the abscess was injected with 1% lidocaine without epinephrine.  A stab wound was made into the abscess with a #11 blade, and a small hemostat was used to widen the incision and allow for more adequate drainage.     The patient tolerated the procedure well.    Assessment:  Incision and drainage of a vulvar abscess.    Post I&D of Vulvar Abscess Counseling:  The patient was counseled to manage post I&D pain with Tylenol, or medication per the medication card.  She was advised to expect a bloody yellowish discharge for 24-48 hours.  If a gauze wick was placed, the patient should expect it to fall out.  She was advised to take warm sitz baths twice daily and to dry well with a cool hair dryer until the lesion has healed fully.  The patient was advised to avoid vaginal intercourse, douching, and tampons for at least a week.  She was counseled to call for evaluation in the event of heavy bleeding, fever greater than 101.0F, or worsening pain and redness at the I&D site.  Counseling lasted about 15 minutes, and all questions were answered.    Follow-up:  Pending biopsy results.

## 2017-05-04 ENCOUNTER — PATIENT MESSAGE (OUTPATIENT)
Dept: OBSTETRICS AND GYNECOLOGY | Facility: CLINIC | Age: 37
End: 2017-05-04

## 2017-05-12 ENCOUNTER — TELEPHONE (OUTPATIENT)
Dept: OBSTETRICS AND GYNECOLOGY | Facility: CLINIC | Age: 37
End: 2017-05-12

## 2017-05-12 ENCOUNTER — ROUTINE PRENATAL (OUTPATIENT)
Dept: OBSTETRICS AND GYNECOLOGY | Facility: CLINIC | Age: 37
End: 2017-05-12
Payer: COMMERCIAL

## 2017-05-12 VITALS
WEIGHT: 164.69 LBS | DIASTOLIC BLOOD PRESSURE: 92 MMHG | SYSTOLIC BLOOD PRESSURE: 140 MMHG | BODY MASS INDEX: 26.58 KG/M2

## 2017-05-12 DIAGNOSIS — Z87.51 HISTORY OF PRETERM DELIVERY: ICD-10-CM

## 2017-05-12 DIAGNOSIS — R10.9 ABDOMINAL PAIN IN PREGNANCY, SECOND TRIMESTER: ICD-10-CM

## 2017-05-12 DIAGNOSIS — Z98.891 HISTORY OF C-SECTION: ICD-10-CM

## 2017-05-12 DIAGNOSIS — Z3A.25 25 WEEKS GESTATION OF PREGNANCY: ICD-10-CM

## 2017-05-12 DIAGNOSIS — O26.892 ABDOMINAL PAIN IN PREGNANCY, SECOND TRIMESTER: ICD-10-CM

## 2017-05-12 DIAGNOSIS — O16.9 HYPERTENSION DURING PREGNANCY, ANTEPARTUM, UNSPECIFIED HYPERTENSION IN PREGNANCY TYPE: ICD-10-CM

## 2017-05-12 DIAGNOSIS — Z34.82 ENCOUNTER FOR SUPERVISION OF OTHER NORMAL PREGNANCY IN SECOND TRIMESTER: Primary | ICD-10-CM

## 2017-05-12 LAB — FIBRONECTIN FETAL SPEC QL: NEGATIVE

## 2017-05-12 PROCEDURE — 82731 ASSAY OF FETAL FIBRONECTIN: CPT

## 2017-05-12 PROCEDURE — 99999 PR PBB SHADOW E&M-EST. PATIENT-LVL II: CPT | Mod: PBBFAC,,, | Performed by: OBSTETRICS & GYNECOLOGY

## 2017-05-12 PROCEDURE — 0502F SUBSEQUENT PRENATAL CARE: CPT | Mod: S$GLB,,, | Performed by: OBSTETRICS & GYNECOLOGY

## 2017-05-12 NOTE — TELEPHONE ENCOUNTER
MsPriscilla Kathy  in the Lab called with  an error on FFN collection,  it was not collected in office, so requisition was incorrect, so I notified dr. Alves, who has put in a new order for it. When I called ms. Chavez back she said she herself put the order in too, and used the same collection time. FFN will be STAT as well.

## 2017-05-12 NOTE — PROGRESS NOTES
FFN done for occasional contractions. Cervical length 2 weeks after MFM. Examined Bartholin's gland and does not feel enlarged from infection. Consider marsupialization once delivered.

## 2017-05-15 ENCOUNTER — TELEPHONE (OUTPATIENT)
Dept: OBSTETRICS AND GYNECOLOGY | Facility: CLINIC | Age: 37
End: 2017-05-15

## 2017-05-23 ENCOUNTER — OFFICE VISIT (OUTPATIENT)
Dept: MATERNAL FETAL MEDICINE | Facility: HOSPITAL | Age: 37
End: 2017-05-23
Attending: OBSTETRICS & GYNECOLOGY
Payer: COMMERCIAL

## 2017-05-23 VITALS
BODY MASS INDEX: 26.03 KG/M2 | WEIGHT: 162 LBS | DIASTOLIC BLOOD PRESSURE: 80 MMHG | SYSTOLIC BLOOD PRESSURE: 140 MMHG | HEIGHT: 66 IN

## 2017-05-23 DIAGNOSIS — O09.522 AMA (ADVANCED MATERNAL AGE) MULTIGRAVIDA 35+, SECOND TRIMESTER: ICD-10-CM

## 2017-05-23 DIAGNOSIS — O09.892 H/O PRETERM DELIVERY, CURRENTLY PREGNANT, SECOND TRIMESTER: ICD-10-CM

## 2017-05-23 DIAGNOSIS — Z36.4 ANTENATAL SCREENING FOR FETAL GROWTH RETARDATION USING ULTRASONICS: ICD-10-CM

## 2017-05-23 DIAGNOSIS — O16.9 HYPERTENSION DURING PREGNANCY, ANTEPARTUM, UNSPECIFIED HYPERTENSION IN PREGNANCY TYPE: ICD-10-CM

## 2017-05-23 PROCEDURE — 76816 OB US FOLLOW-UP PER FETUS: CPT

## 2017-05-23 PROCEDURE — 76816 OB US FOLLOW-UP PER FETUS: CPT | Mod: 26,,, | Performed by: OBSTETRICS & GYNECOLOGY

## 2017-05-23 PROCEDURE — 99499 UNLISTED E&M SERVICE: CPT | Mod: ,,, | Performed by: OBSTETRICS & GYNECOLOGY

## 2017-05-23 NOTE — PROGRESS NOTES
Indication  ========    Evaluation of fetal growth, MVP (Dr. Alves) .    History  ======    General History  Height 170 cm  Height (ft) 5 ft  Height (in) 7 in  Other: AMA: Materni T21  CHTN (no meds) diagnosed from <20 wk elevated BP's  Previous Outcomes   4  Para 1  Abortions (A) 2  Hart living children (L) 1  Miscarriages 2  Risk Factors  Details: IUI pregnancy  Details: hx of PTD 36wks  Details: C/S    Pregnancy History  ==============    Maternal Lab Tests  Test: Cell Free DNA Testing  Result: [id number] Negative  Wants to know gender: yes    Maternal Assessment  =================    Weight 73 kg  Weight (lb) 162 lb  Height 170 cm  Height (ft) 5 ft  Height (in) 7 in  BP syst 130 mmHg  BP diast 80 mmHg  BMI 25.37 kg/m²  Other: repeat 140/80 then 132/80 mmhg    Method  ======    Transabdominal ultrasound examination. View: Good view.    Pregnancy  =========    Hart pregnancy. Number of fetuses: 1.    Dating  ======    LMP on: 2016  Cycle: regular cycle  GA by LMP 26 w + 3 d  ODILON by LMP: 2017  Ultrasound examination on: 2017  GA by U/S based upon: AC, BPD, Femur, HC  GA by U/S 27 w + 3 d  ODILON by U/S: 2017  Assigned: Dating performed on 2017, based on the LMP  Assigned GA 26 w + 3 d  Assigned ODILON: 2017    General Evaluation  ==============    Cardiac activity: present.  bpm.  Fetal movements: visualized.  Presentation: breech right.  Placenta:  Placental site: anterior, high.  Umbilical cord: Cord vessels: 3 vessel cord.  Amniotic fluid: Amount of AF: normal amount. MVP 4.3 cm.    Fetal Biometry  ============    Fetal Biometry  BPD 66.4 mm 26w 5d Hadlock  .3 mm 28w 0d Hadlock  .3 mm 28w 0d Hadlock  Femur 50.5 mm 27w 1d Hadlock  EFW 1,096 g 83% 27w 2d Hadlock  Calculated by: Hadlock (BPD-HC-AC-FL)  EFW (lb) 2 lb  EFW (oz) 7 oz  HC / AC 1.09  FL / BPD 0.76  FL / AC 0.21  MVP 4.3 cm   bpm    Fetal  Anatomy  ===========    Cranium: normal  Lateral ventricles: documented previously  Choroid plexus: documented previously  Midline falx: documented previously  Cavum septi pellucidi: documented previously  Cerebellum: documented previously  Cisterna magna: documented previously  Lips: documented previously  Profile: normal  Nose: documented previously  4-chamber view: normal  RVOT: documented previously  LVOT: documented previously  Heart / Thorax: septum prev seen wnl  Aortic arch: documented previously  Ductal arch: suboptimal  SVC: suboptimal  IVC: suboptimal  Diaphragm: normal  Cord insertion: documented previously  Stomach: normal  Kidneys: normal  Bladder: normal  Genitals: normal  Cervical spine: documented previously  Thoracic spine: documented previously  Lumbar spine: documented previously  Sacral spine: documented previously  Arms: documented previously  Legs: documented previously  Rt hand: prev seen partially open  Lt hand: prev seen closed  Gender: female  Wants to know gender: yes    Maternal Structures  ===============    Uterus / Cervix  Fibroids: Fibroids identified  Findings: Intramural. Anterior. Homogeneous structure. visualized without fluid enhancement  D1 24.1 mm  D2 20.3 mm  D3 11.3 mm  Mean 18.6 mm  Vol 2.895 cm³    Impression  =========    1. 26w 3d beach pregnancy  2. Normal interval fetal growth with EFW = 1096 gms at 83%  3. No fetal structural abnormalities appreciated: see above for details of evaluation  4. Amniotic fluid volume wnl (MVP 4.3cm)  5. Small anterior fibroid.    Recommendation  ==============    1. M will schedule patient for a f/u sono in 5-6 wks for interval growth, MVP, and overall fetal well being secondary to AMA and  HTN diagnosed secondary to <20 wks elevated BP's    2. Of note: we are using ODILON = 08- secondary to a first trimester sono that did not have a greater than 7 day discrepancy with her  LMP. Therefore ODILON per LMP was used (please do not  hesitate to contact me if you have any questions)    3. HTN diagnosis based on elevated BP's prior to 20 wk: today, one mildly elevated BP and repeat wnl  continue with close monitoring of BP's  refer to prior sono/consult on 04- for previous recommendations for hypertension in pregnancy including fetal  surveillance, serial sonos for fetal growth, and timing of delivery    Please do not hesitate to contact us if you have any questions  .

## 2017-05-24 ENCOUNTER — PATIENT MESSAGE (OUTPATIENT)
Dept: OBSTETRICS AND GYNECOLOGY | Facility: CLINIC | Age: 37
End: 2017-05-24

## 2017-05-25 NOTE — TELEPHONE ENCOUNTER
Breech not a big deal since doing . Needs to try to eat more and have more weight gain but we will be keeping track of the babies weekend as well. She may need to add extra protein which she can do by doing a smoothie [employer] and asking them to add a scoop of whey proteins for each day. Another thing she can do is try to Boost supplement drinks.

## 2017-05-26 NOTE — TELEPHONE ENCOUNTER
Ok to take Zantac with Diclegis but can take Zantac whatever time she likes but might be best to take at lunch since gets more stomach issues in the afternoon after 3pm. She can take up to 300mg of Zantac and yes it is safe. Probably needs to take on a daily basis.

## 2017-06-09 ENCOUNTER — LAB VISIT (OUTPATIENT)
Dept: LAB | Facility: HOSPITAL | Age: 37
End: 2017-06-09
Attending: OBSTETRICS & GYNECOLOGY
Payer: COMMERCIAL

## 2017-06-09 ENCOUNTER — ROUTINE PRENATAL (OUTPATIENT)
Dept: OBSTETRICS AND GYNECOLOGY | Facility: CLINIC | Age: 37
End: 2017-06-09
Payer: COMMERCIAL

## 2017-06-09 ENCOUNTER — OFFICE VISIT (OUTPATIENT)
Dept: OBSTETRICS AND GYNECOLOGY | Facility: CLINIC | Age: 37
End: 2017-06-09
Payer: COMMERCIAL

## 2017-06-09 VITALS
DIASTOLIC BLOOD PRESSURE: 98 MMHG | WEIGHT: 163.81 LBS | BODY MASS INDEX: 26.44 KG/M2 | SYSTOLIC BLOOD PRESSURE: 150 MMHG

## 2017-06-09 DIAGNOSIS — Z87.51 HISTORY OF PRETERM DELIVERY: ICD-10-CM

## 2017-06-09 DIAGNOSIS — Z34.83 ENCOUNTER FOR SUPERVISION OF OTHER NORMAL PREGNANCY IN THIRD TRIMESTER: Primary | ICD-10-CM

## 2017-06-09 DIAGNOSIS — Z98.891 HISTORY OF C-SECTION: ICD-10-CM

## 2017-06-09 DIAGNOSIS — O16.9 HYPERTENSION DURING PREGNANCY, ANTEPARTUM, UNSPECIFIED HYPERTENSION IN PREGNANCY TYPE: ICD-10-CM

## 2017-06-09 DIAGNOSIS — Z34.82 ENCOUNTER FOR SUPERVISION OF OTHER NORMAL PREGNANCY IN SECOND TRIMESTER: ICD-10-CM

## 2017-06-09 PROBLEM — Z34.93 ENCOUNTER FOR SUPERVISION OF NORMAL PREGNANCY IN THIRD TRIMESTER: Status: ACTIVE | Noted: 2017-02-06

## 2017-06-09 LAB
BASOPHILS # BLD AUTO: 0.02 K/UL
BASOPHILS NFR BLD: 0.2 %
CREAT UR-MCNC: 170 MG/DL
DIFFERENTIAL METHOD: ABNORMAL
EOSINOPHIL # BLD AUTO: 0.2 K/UL
EOSINOPHIL NFR BLD: 3 %
ERYTHROCYTE [DISTWIDTH] IN BLOOD BY AUTOMATED COUNT: 12.7 %
GLUCOSE SERPL-MCNC: 135 MG/DL
HCT VFR BLD AUTO: 34.2 %
HGB BLD-MCNC: 11.3 G/DL
LYMPHOCYTES # BLD AUTO: 1.5 K/UL
LYMPHOCYTES NFR BLD: 18.5 %
MCH RBC QN AUTO: 28.7 PG
MCHC RBC AUTO-ENTMCNC: 33 %
MCV RBC AUTO: 87 FL
MONOCYTES # BLD AUTO: 0.4 K/UL
MONOCYTES NFR BLD: 5.3 %
NEUTROPHILS # BLD AUTO: 5.8 K/UL
NEUTROPHILS NFR BLD: 72.1 %
PLATELET # BLD AUTO: 253 K/UL
PMV BLD AUTO: 9 FL
PROT UR-MCNC: 20 MG/DL
PROT/CREAT RATIO, UR: 0.12
RBC # BLD AUTO: 3.94 M/UL
WBC # BLD AUTO: 8.09 K/UL

## 2017-06-09 PROCEDURE — 0502F SUBSEQUENT PRENATAL CARE: CPT | Mod: S$GLB,,, | Performed by: OBSTETRICS & GYNECOLOGY

## 2017-06-09 PROCEDURE — 76817 TRANSVAGINAL US OBSTETRIC: CPT | Mod: S$GLB,,, | Performed by: OBSTETRICS & GYNECOLOGY

## 2017-06-09 PROCEDURE — 84156 ASSAY OF PROTEIN URINE: CPT

## 2017-06-09 PROCEDURE — 99999 PR PBB SHADOW E&M-EST. PATIENT-LVL II: CPT | Mod: PBBFAC,,, | Performed by: OBSTETRICS & GYNECOLOGY

## 2017-06-09 PROCEDURE — 85025 COMPLETE CBC W/AUTO DIFF WBC: CPT

## 2017-06-09 PROCEDURE — 36415 COLL VENOUS BLD VENIPUNCTURE: CPT

## 2017-06-09 PROCEDURE — 82950 GLUCOSE TEST: CPT

## 2017-06-09 RX ORDER — DOXYLAMINE SUCCINATE AND PYRIDOXINE HYDROCHLORIDE 10; 10 MG/1; MG/1
1 TABLET, DELAYED RELEASE ORAL 2 TIMES DAILY
Qty: 60 TABLET | Refills: 2 | Status: ON HOLD | OUTPATIENT
Start: 2017-06-09 | End: 2017-07-21 | Stop reason: HOSPADM

## 2017-06-13 ENCOUNTER — PATIENT MESSAGE (OUTPATIENT)
Dept: OBSTETRICS AND GYNECOLOGY | Facility: CLINIC | Age: 37
End: 2017-06-13

## 2017-06-21 ENCOUNTER — ROUTINE PRENATAL (OUTPATIENT)
Dept: OBSTETRICS AND GYNECOLOGY | Facility: CLINIC | Age: 37
End: 2017-06-21
Payer: COMMERCIAL

## 2017-06-21 ENCOUNTER — OFFICE VISIT (OUTPATIENT)
Dept: MATERNAL FETAL MEDICINE | Facility: HOSPITAL | Age: 37
End: 2017-06-21
Attending: OBSTETRICS & GYNECOLOGY
Payer: COMMERCIAL

## 2017-06-21 VITALS
WEIGHT: 164 LBS | HEIGHT: 66 IN | HEART RATE: 103 BPM | SYSTOLIC BLOOD PRESSURE: 130 MMHG | DIASTOLIC BLOOD PRESSURE: 80 MMHG | BODY MASS INDEX: 26.36 KG/M2

## 2017-06-21 VITALS — BODY MASS INDEX: 26.47 KG/M2 | SYSTOLIC BLOOD PRESSURE: 130 MMHG | DIASTOLIC BLOOD PRESSURE: 80 MMHG | WEIGHT: 164 LBS

## 2017-06-21 DIAGNOSIS — Z87.51 HISTORY OF PRETERM DELIVERY: ICD-10-CM

## 2017-06-21 DIAGNOSIS — O16.9 HYPERTENSION DURING PREGNANCY, ANTEPARTUM, UNSPECIFIED HYPERTENSION IN PREGNANCY TYPE: ICD-10-CM

## 2017-06-21 DIAGNOSIS — O09.892 H/O PRETERM DELIVERY, CURRENTLY PREGNANT, SECOND TRIMESTER: ICD-10-CM

## 2017-06-21 DIAGNOSIS — Z3A.31 31 WEEKS GESTATION OF PREGNANCY: ICD-10-CM

## 2017-06-21 DIAGNOSIS — Z36.89 ENCOUNTER FOR ULTRASOUND TO CHECK FETAL GROWTH: ICD-10-CM

## 2017-06-21 DIAGNOSIS — O09.522 AMA (ADVANCED MATERNAL AGE) MULTIGRAVIDA 35+, SECOND TRIMESTER: ICD-10-CM

## 2017-06-21 DIAGNOSIS — Z34.83 ENCOUNTER FOR SUPERVISION OF OTHER NORMAL PREGNANCY IN THIRD TRIMESTER: Primary | ICD-10-CM

## 2017-06-21 DIAGNOSIS — Z98.891 HISTORY OF C-SECTION: ICD-10-CM

## 2017-06-21 PROCEDURE — 99499 UNLISTED E&M SERVICE: CPT | Mod: ,,, | Performed by: OBSTETRICS & GYNECOLOGY

## 2017-06-21 PROCEDURE — 76816 OB US FOLLOW-UP PER FETUS: CPT | Mod: 26,,, | Performed by: OBSTETRICS & GYNECOLOGY

## 2017-06-21 PROCEDURE — 76816 OB US FOLLOW-UP PER FETUS: CPT

## 2017-06-21 PROCEDURE — 0502F SUBSEQUENT PRENATAL CARE: CPT | Mod: S$GLB,,, | Performed by: OBSTETRICS & GYNECOLOGY

## 2017-06-21 PROCEDURE — 99999 PR PBB SHADOW E&M-EST. PATIENT-LVL II: CPT | Mod: PBBFAC,,, | Performed by: OBSTETRICS & GYNECOLOGY

## 2017-06-23 NOTE — PROGRESS NOTES
Indication  ========    Evaluation of fetal growth.    History  ======    General History  Height 170 cm  Height (ft) 5 ft  Height (in) 7 in  Other: AMA: Materni T21  CHTN (no meds) diagnosed from <20 wk elevated BP's  Previous Outcomes   4  Para 1  Abortions (A) 2  Hart living children (L) 1  Miscarriages 2  Risk Factors  Details: IUI pregnancy  Details: hx of PTD 36wks  Details: C/S    Pregnancy History  ==============    Maternal Lab Tests  Test: Cell Free DNA Testing  Result: [id number] Negative  Wants to know gender: yes    Maternal Assessment  =================    Weight 74 kg  Weight (lb) 164 lb  Height 170 cm  Height (ft) 5 ft  Height (in) 7 in  BP syst 130 mmHg  BP diast 88 mmHg  BMI 25.69 kg/m²    Method  ======    Transabdominal ultrasound examination. View: Good view.    Pregnancy  =========    Hart pregnancy. Number of fetuses: 1.    Dating  ======    LMP on: 2016  Cycle: regular cycle  GA by LMP 30 w + 4 d  ODILON by LMP: 2017  Ultrasound examination on: 2017  GA by U/S based upon: AC, BPD, Femur, HC  GA by U/S 32 w + 5 d  ODILON by U/S: 2017  Assigned: Dating performed on 2017, based on the LMP  Assigned GA 30 w + 4 d  Assigned ODILON: 2017    General Evaluation  ==============    Cardiac activity: present.  bpm.  Fetal movements: visualized.  Presentation: cephalic.  Placenta:  Placental site: anterior, high.  Umbilical cord: Cord vessels: 3 vessel cord.  Amniotic fluid: Amount of AF: normal amount. MVP 6.3 cm.    Biophysical Profile  ==============    2: Fetal breathing movements  2: Gross body movements  2: Fetal tone  2: Amniotic fluid volume  : Biophysical profile score    Fetal Biometry  ============    Fetal Biometry  BPD 81.4 mm 32w 5d Hadlock  .3 mm 32w 3d Hadlock  .4 mm 32w 1d Hadlock  Femur 65.0 mm 33w 4d Hadlock  EFW 2,022 g 61% Rocky  Calculated by: Hadlock (BPD-HC-AC-FL)  EFW (lb) 4 lb  EFW (oz) 7 oz  HC / AC 1.05  FL /  BPD 0.80  FL / AC 0.23  MVP 6.3 cm   bpm    Fetal Anatomy  ============    Cranium: normal  Lateral ventricles: normal  Choroid plexus: documented previously  Midline falx: documented previously  Cavum septi pellucidi: documented previously  Cerebellum: documented previously  Cisterna magna: documented previously  4-chamber view: normal  LVOT: normal  Ductal arch: suboptimal  SVC: suboptimal  IVC: suboptimal  Diaphragm: normal  Stomach: normal  Kidneys: normal  Bladder: normal  Arms: documented previously  Legs: documented previously  Wants to know gender: yes  Other: A full anatomic survey has been previously performed.    Impression  =========    Hart live intrauterine pregnancy.  Overall fetal growth is normal.  Normal amniotic fluid volume.  BPP 8/8.  Some of the anatomy is still suboptimally visualized. The anatomy that was seen appeared normal.  The patient did not have any questions today to discuss via telemedicine.    Recommendation  ==============    Follow up ultrasound in 4 weeks for growth due to chronic hypertension. Blood pressure was within range today.  Please see prior consults for recommendations.

## 2017-06-26 ENCOUNTER — PATIENT MESSAGE (OUTPATIENT)
Dept: OBSTETRICS AND GYNECOLOGY | Facility: CLINIC | Age: 37
End: 2017-06-26

## 2017-06-26 ENCOUNTER — TELEPHONE (OUTPATIENT)
Dept: OBSTETRICS AND GYNECOLOGY | Facility: CLINIC | Age: 37
End: 2017-06-26

## 2017-06-26 NOTE — TELEPHONE ENCOUNTER
----- Message from Najma Fontaine sent at 6/26/2017  9:35 AM CDT -----  Contact: Margarette from [facility]/[phone]  Margarette said patient gets a weekly injection of Pippa, she needs to speak with you regarding at what point to stop injection, for insurance. Please advise

## 2017-07-03 ENCOUNTER — PATIENT MESSAGE (OUTPATIENT)
Dept: OBSTETRICS AND GYNECOLOGY | Facility: CLINIC | Age: 37
End: 2017-07-03

## 2017-07-03 NOTE — TELEPHONE ENCOUNTER
If it increases over the week go to L&D. We plan to do a fetal fibronectin on Friday 7/7 at [facility]

## 2017-07-07 ENCOUNTER — ROUTINE PRENATAL (OUTPATIENT)
Dept: OBSTETRICS AND GYNECOLOGY | Facility: CLINIC | Age: 37
End: 2017-07-07
Payer: COMMERCIAL

## 2017-07-07 VITALS
DIASTOLIC BLOOD PRESSURE: 86 MMHG | SYSTOLIC BLOOD PRESSURE: 134 MMHG | BODY MASS INDEX: 27.08 KG/M2 | WEIGHT: 167.75 LBS

## 2017-07-07 DIAGNOSIS — Z98.891 HISTORY OF C-SECTION: ICD-10-CM

## 2017-07-07 DIAGNOSIS — O16.9 HYPERTENSION DURING PREGNANCY, ANTEPARTUM, UNSPECIFIED HYPERTENSION IN PREGNANCY TYPE: ICD-10-CM

## 2017-07-07 DIAGNOSIS — Z87.51 HISTORY OF PRETERM DELIVERY: ICD-10-CM

## 2017-07-07 DIAGNOSIS — Z34.83 ENCOUNTER FOR SUPERVISION OF OTHER NORMAL PREGNANCY IN THIRD TRIMESTER: Primary | ICD-10-CM

## 2017-07-07 LAB — FIBRONECTIN FETAL SPEC QL: NEGATIVE

## 2017-07-07 PROCEDURE — 99999 PR PBB SHADOW E&M-EST. PATIENT-LVL III: CPT | Mod: PBBFAC,,, | Performed by: OBSTETRICS & GYNECOLOGY

## 2017-07-07 PROCEDURE — 0502F SUBSEQUENT PRENATAL CARE: CPT | Mod: S$GLB,,, | Performed by: OBSTETRICS & GYNECOLOGY

## 2017-07-07 PROCEDURE — 82731 ASSAY OF FETAL FIBRONECTIN: CPT

## 2017-07-13 ENCOUNTER — ROUTINE PRENATAL (OUTPATIENT)
Dept: OBSTETRICS AND GYNECOLOGY | Facility: CLINIC | Age: 37
End: 2017-07-13
Payer: COMMERCIAL

## 2017-07-13 VITALS
BODY MASS INDEX: 27.08 KG/M2 | DIASTOLIC BLOOD PRESSURE: 98 MMHG | WEIGHT: 167.75 LBS | SYSTOLIC BLOOD PRESSURE: 142 MMHG

## 2017-07-13 DIAGNOSIS — Z34.83 ENCOUNTER FOR SUPERVISION OF OTHER NORMAL PREGNANCY IN THIRD TRIMESTER: Primary | ICD-10-CM

## 2017-07-13 DIAGNOSIS — O12.10 PROTEINURIA DURING PREGNANCY: ICD-10-CM

## 2017-07-13 DIAGNOSIS — N89.8 VAGINAL DISCHARGE: ICD-10-CM

## 2017-07-13 DIAGNOSIS — Z3A.34 34 WEEKS GESTATION OF PREGNANCY: ICD-10-CM

## 2017-07-13 DIAGNOSIS — Z98.891 HISTORY OF C-SECTION: ICD-10-CM

## 2017-07-13 DIAGNOSIS — Z87.51 HISTORY OF PRETERM DELIVERY: ICD-10-CM

## 2017-07-13 DIAGNOSIS — O16.9 HYPERTENSION DURING PREGNANCY, ANTEPARTUM, UNSPECIFIED HYPERTENSION IN PREGNANCY TYPE: ICD-10-CM

## 2017-07-13 LAB
CREAT UR-MCNC: 122 MG/DL
PROT UR-MCNC: 23 MG/DL
PROT/CREAT RATIO, UR: 0.19

## 2017-07-13 PROCEDURE — 87660 TRICHOMONAS VAGIN DIR PROBE: CPT

## 2017-07-13 PROCEDURE — 84156 ASSAY OF PROTEIN URINE: CPT

## 2017-07-13 PROCEDURE — 0502F SUBSEQUENT PRENATAL CARE: CPT | Mod: S$GLB,,, | Performed by: OBSTETRICS & GYNECOLOGY

## 2017-07-13 PROCEDURE — 87480 CANDIDA DNA DIR PROBE: CPT

## 2017-07-13 PROCEDURE — 99999 PR PBB SHADOW E&M-EST. PATIENT-LVL II: CPT | Mod: PBBFAC,,, | Performed by: OBSTETRICS & GYNECOLOGY

## 2017-07-13 RX ORDER — TETANUS TOXOID, REDUCED DIPHTHERIA TOXOID AND ACELLULAR PERTUSSIS VACCINE, ADSORBED 5; 2.5; 8; 8; 2.5 [IU]/.5ML; [IU]/.5ML; UG/.5ML; UG/.5ML; UG/.5ML
SUSPENSION INTRAMUSCULAR
Status: ON HOLD | COMMUNITY
Start: 2017-07-07 | End: 2017-07-21 | Stop reason: HOSPADM

## 2017-07-13 RX ORDER — METRONIDAZOLE 250 MG/1
250 TABLET ORAL 3 TIMES DAILY
Qty: 21 TABLET | Refills: 0 | Status: ON HOLD | OUTPATIENT
Start: 2017-07-13 | End: 2017-07-21 | Stop reason: HOSPADM

## 2017-07-13 NOTE — PROGRESS NOTES
Complaining of contractions, vaginal discharge (no more then normal) and a lot more vaginal pressure then normal.

## 2017-07-14 ENCOUNTER — PATIENT MESSAGE (OUTPATIENT)
Dept: OBSTETRICS AND GYNECOLOGY | Facility: CLINIC | Age: 37
End: 2017-07-14

## 2017-07-14 NOTE — TELEPHONE ENCOUNTER
Leukocytes maybe leukorrhea of pregnancy which is normal but can collect clean catch urine at next appt.

## 2017-07-19 ENCOUNTER — TELEPHONE (OUTPATIENT)
Dept: OBSTETRICS AND GYNECOLOGY | Facility: CLINIC | Age: 37
End: 2017-07-19

## 2017-07-19 ENCOUNTER — HOSPITAL ENCOUNTER (INPATIENT)
Facility: HOSPITAL | Age: 37
LOS: 6 days | Discharge: HOME | End: 2017-07-26
Attending: OBSTETRICS & GYNECOLOGY | Admitting: OBSTETRICS & GYNECOLOGY
Payer: COMMERCIAL

## 2017-07-19 ENCOUNTER — OFFICE VISIT (OUTPATIENT)
Dept: MATERNAL FETAL MEDICINE | Facility: HOSPITAL | Age: 37
End: 2017-07-19
Attending: OBSTETRICS & GYNECOLOGY
Payer: COMMERCIAL

## 2017-07-19 VITALS
BODY MASS INDEX: 26.68 KG/M2 | WEIGHT: 166 LBS | SYSTOLIC BLOOD PRESSURE: 160 MMHG | DIASTOLIC BLOOD PRESSURE: 94 MMHG | HEART RATE: 111 BPM | HEIGHT: 66 IN

## 2017-07-19 DIAGNOSIS — O16.9 HYPERTENSION DURING PREGNANCY, ANTEPARTUM, UNSPECIFIED HYPERTENSION IN PREGNANCY TYPE: ICD-10-CM

## 2017-07-19 DIAGNOSIS — O09.523 MULTIGRAVIDA OF ADVANCED MATERNAL AGE IN THIRD TRIMESTER: ICD-10-CM

## 2017-07-19 DIAGNOSIS — O09.522 AMA (ADVANCED MATERNAL AGE) MULTIGRAVIDA 35+, SECOND TRIMESTER: ICD-10-CM

## 2017-07-19 DIAGNOSIS — Z36.4 ANTENATAL SCREENING FOR FETAL GROWTH RETARDATION USING ULTRASONICS: ICD-10-CM

## 2017-07-19 DIAGNOSIS — O09.893 H/O PRETERM DELIVERY, CURRENTLY PREGNANT, THIRD TRIMESTER: ICD-10-CM

## 2017-07-19 DIAGNOSIS — O10.919 PRE-EXISTING HYPERTENSION DURING PREGNANCY, ANTEPARTUM, UNSPECIFIED PRE-EXISTING HYPERTENSION TYPE: Primary | ICD-10-CM

## 2017-07-19 DIAGNOSIS — Z98.891 HISTORY OF C-SECTION: ICD-10-CM

## 2017-07-19 DIAGNOSIS — O16.3 HYPERTENSION AFFECTING PREGNANCY IN THIRD TRIMESTER: ICD-10-CM

## 2017-07-19 DIAGNOSIS — O09.892 H/O PRETERM DELIVERY, CURRENTLY PREGNANT, SECOND TRIMESTER: ICD-10-CM

## 2017-07-19 DIAGNOSIS — O09.523 ADVANCED MATERNAL AGE IN MULTIGRAVIDA, THIRD TRIMESTER: ICD-10-CM

## 2017-07-19 LAB
ALBUMIN SERPL BCP-MCNC: 2.7 G/DL
ALP SERPL-CCNC: 132 U/L
ALT SERPL W/O P-5'-P-CCNC: 50 U/L
ANION GAP SERPL CALC-SCNC: 8 MMOL/L
AST SERPL-CCNC: 38 U/L
BASOPHILS # BLD AUTO: 0.02 K/UL
BASOPHILS NFR BLD: 0.2 %
BILIRUB SERPL-MCNC: 0.5 MG/DL
BUN SERPL-MCNC: 8 MG/DL
CALCIUM SERPL-MCNC: 8.8 MG/DL
CHLORIDE SERPL-SCNC: 106 MMOL/L
CO2 SERPL-SCNC: 20 MMOL/L
CREAT SERPL-MCNC: 0.6 MG/DL
CREAT UR-MCNC: 22.4 MG/DL
DIFFERENTIAL METHOD: ABNORMAL
EOSINOPHIL # BLD AUTO: 0.2 K/UL
EOSINOPHIL NFR BLD: 2.1 %
ERYTHROCYTE [DISTWIDTH] IN BLOOD BY AUTOMATED COUNT: 13 %
EST. GFR  (AFRICAN AMERICAN): >60 ML/MIN/1.73 M^2
EST. GFR  (NON AFRICAN AMERICAN): >60 ML/MIN/1.73 M^2
GLUCOSE SERPL-MCNC: 88 MG/DL
HCT VFR BLD AUTO: 33.4 %
HGB BLD-MCNC: 10.8 G/DL
LDH SERPL L TO P-CCNC: 136 U/L
LYMPHOCYTES # BLD AUTO: 1.7 K/UL
LYMPHOCYTES NFR BLD: 20.4 %
MCH RBC QN AUTO: 27.3 PG
MCHC RBC AUTO-ENTMCNC: 32.3 %
MCV RBC AUTO: 85 FL
MONOCYTES # BLD AUTO: 0.5 K/UL
MONOCYTES NFR BLD: 5.5 %
NEUTROPHILS # BLD AUTO: 6 K/UL
NEUTROPHILS NFR BLD: 71 %
PLATELET # BLD AUTO: 202 K/UL
PMV BLD AUTO: 8.8 FL
POTASSIUM SERPL-SCNC: 3.8 MMOL/L
PROT SERPL-MCNC: 6.4 G/DL
PROT UR-MCNC: <7 MG/DL
PROT/CREAT RATIO, UR: NORMAL
RBC # BLD AUTO: 3.95 M/UL
SODIUM SERPL-SCNC: 134 MMOL/L
WBC # BLD AUTO: 8.5 K/UL

## 2017-07-19 PROCEDURE — 25000003 PHARM REV CODE 250: Performed by: OBSTETRICS & GYNECOLOGY

## 2017-07-19 PROCEDURE — 85025 COMPLETE CBC W/AUTO DIFF WBC: CPT

## 2017-07-19 PROCEDURE — 80053 COMPREHEN METABOLIC PANEL: CPT

## 2017-07-19 PROCEDURE — 82570 ASSAY OF URINE CREATININE: CPT

## 2017-07-19 PROCEDURE — S0028 INJECTION, FAMOTIDINE, 20 MG: HCPCS | Performed by: OBSTETRICS & GYNECOLOGY

## 2017-07-19 PROCEDURE — 99499 UNLISTED E&M SERVICE: CPT | Mod: ,,, | Performed by: OBSTETRICS & GYNECOLOGY

## 2017-07-19 PROCEDURE — 76816 OB US FOLLOW-UP PER FETUS: CPT | Mod: 26,,, | Performed by: OBSTETRICS & GYNECOLOGY

## 2017-07-19 PROCEDURE — 99219 PR INITIAL OBSERVATION CARE,LEVL II: CPT | Mod: ,,, | Performed by: OBSTETRICS & GYNECOLOGY

## 2017-07-19 PROCEDURE — 63600175 PHARM REV CODE 636 W HCPCS: Performed by: OBSTETRICS & GYNECOLOGY

## 2017-07-19 PROCEDURE — 76819 FETAL BIOPHYS PROFIL W/O NST: CPT | Mod: 26,,, | Performed by: OBSTETRICS & GYNECOLOGY

## 2017-07-19 PROCEDURE — 96361 HYDRATE IV INFUSION ADD-ON: CPT

## 2017-07-19 PROCEDURE — 36415 COLL VENOUS BLD VENIPUNCTURE: CPT

## 2017-07-19 PROCEDURE — 83615 LACTATE (LD) (LDH) ENZYME: CPT

## 2017-07-19 PROCEDURE — 99211 OFF/OP EST MAY X REQ PHY/QHP: CPT | Mod: 25

## 2017-07-19 PROCEDURE — 96360 HYDRATION IV INFUSION INIT: CPT

## 2017-07-19 PROCEDURE — 11000001 HC ACUTE MED/SURG PRIVATE ROOM

## 2017-07-19 RX ORDER — BETAMETHASONE SODIUM PHOSPHATE AND BETAMETHASONE ACETATE 3; 3 MG/ML; MG/ML
12 INJECTION, SUSPENSION INTRA-ARTICULAR; INTRALESIONAL; INTRAMUSCULAR; SOFT TISSUE ONCE
Status: DISCONTINUED | OUTPATIENT
Start: 2017-07-20 | End: 2017-07-20

## 2017-07-19 RX ORDER — BETAMETHASONE SODIUM PHOSPHATE AND BETAMETHASONE ACETATE 3; 3 MG/ML; MG/ML
12 INJECTION, SUSPENSION INTRA-ARTICULAR; INTRALESIONAL; INTRAMUSCULAR; SOFT TISSUE ONCE
Status: COMPLETED | OUTPATIENT
Start: 2017-07-19 | End: 2017-07-19

## 2017-07-19 RX ORDER — ONDANSETRON 8 MG/1
8 TABLET, ORALLY DISINTEGRATING ORAL EVERY 8 HOURS PRN
Status: DISCONTINUED | OUTPATIENT
Start: 2017-07-19 | End: 2017-07-26 | Stop reason: HOSPADM

## 2017-07-19 RX ORDER — SODIUM CHLORIDE, SODIUM LACTATE, POTASSIUM CHLORIDE, CALCIUM CHLORIDE 600; 310; 30; 20 MG/100ML; MG/100ML; MG/100ML; MG/100ML
INJECTION, SOLUTION INTRAVENOUS CONTINUOUS
Status: DISCONTINUED | OUTPATIENT
Start: 2017-07-19 | End: 2017-07-25 | Stop reason: SDUPTHER

## 2017-07-19 RX ORDER — FAMOTIDINE 10 MG/ML
20 INJECTION INTRAVENOUS 2 TIMES DAILY
Status: DISCONTINUED | OUTPATIENT
Start: 2017-07-19 | End: 2017-07-22

## 2017-07-19 RX ADMIN — SODIUM CHLORIDE, SODIUM LACTATE, POTASSIUM CHLORIDE, AND CALCIUM CHLORIDE 1000 ML: .6; .31; .03; .02 INJECTION, SOLUTION INTRAVENOUS at 03:07

## 2017-07-19 RX ADMIN — SODIUM CHLORIDE, SODIUM LACTATE, POTASSIUM CHLORIDE, AND CALCIUM CHLORIDE: .6; .31; .03; .02 INJECTION, SOLUTION INTRAVENOUS at 05:07

## 2017-07-19 RX ADMIN — FAMOTIDINE 20 MG: 10 INJECTION, SOLUTION INTRAVENOUS at 05:07

## 2017-07-19 RX ADMIN — BETAMETHASONE SODIUM PHOSPHATE AND BETAMETHASONE ACETATE 12 MG: 3; 3 INJECTION, SUSPENSION INTRA-ARTICULAR; INTRALESIONAL; INTRAMUSCULAR at 05:07

## 2017-07-19 NOTE — PROGRESS NOTES
Indication  ========    Evaluation of fetal growth, MVP and BPP without NST (Dr. Alves).    History  ======    General History  Height 170 cm  Height (ft) 5 ft  Height (in) 7 in  Other: AMA: Materni T21  CHTN (no meds) diagnosed from <20 wk elevated BP's  Previous Outcomes   4  Para 1  Abortions (A) 2  Hart living children (L) 1  Miscarriages 2  Risk Factors  Details: IUI pregnancy  Details: hx of PTD 36wks  Details: C/S    Pregnancy History  ==============    Maternal Lab Tests  Test: Cell Free DNA Testing  Result: [id number] Negative  Wants to know gender: yes    Maternal Assessment  =================    Weight 75 kg  Weight (lb) 166 lb  Height 170 cm  Height (ft) 5 ft  Height (in) 7 in  BP syst 160 mmHg  BP diast 94 mmHg  BMI 26.00 kg/m²  Other: repeat /100 mmhg    Method  ======    Transabdominal ultrasound examination. View: Good view.    Pregnancy  =========    Hart pregnancy. Number of fetuses: 1.    Dating  ======    LMP on: 2016  Cycle: regular cycle  GA by LMP 34 w + 4 d  ODILON by LMP: 2017  Ultrasound examination on: 2017  GA by U/S based upon: AC, BPD, Femur, HC  GA by U/S 36 w + 2 d  ODILON by U/S: 2017  Assigned: Dating performed on 2017, based on the LMP  Assigned GA 34 w + 4 d  Assigned ODILON: 2017    General Evaluation  ==============    Cardiac activity: present.  bpm.  Fetal movements: visualized.  Presentation: cephalic.  Placenta:  Placental site: anterior, high.  Umbilical cord: Cord vessels: 3 vessel cord.  Amniotic fluid: Amount of AF: normal amount. MVP 5.4 cm.    Biophysical Profile  ==============    2: Fetal breathing movements  2: Gross body movements  2: Fetal tone  2: Amniotic fluid volume  : Biophysical profile score    Fetal Biometry  ============    Fetal Biometry  BPD 90.7 mm 36w 5d Hadlock  .4 mm 36w 2d Hadlock  .0 mm 35w 2d Hadlock  Femur 71.2 mm 36w 3d Hadlock  EFW 2,794 g 52% Rocky  Calculated  by: Hadlock (BPD-HC-AC-FL)  EFW (lb) 6 lb  EFW (oz) 3 oz  HC / AC 1.02  FL / BPD 0.79  FL / AC 0.23  MVP 5.4 cm   bpm    Fetal Anatomy  ===========    Cranium: normal  Cavum septi pellucidi: normal  Posterior fossa: normal  4-chamber view: normal  Ductal arch: suboptimal  SVC: suboptimal  IVC: suboptimal  Stomach: normal  Kidneys: normal  Bladder: normal  Arms: documented previously  Legs: documented previously  Wants to know gender: yes  Other: A full anatomy survey previously performed.    Impression  =========    1. 34w 4d beach pregnancy  2. Normal interval fetal growth (EFW = 2794 gms at 52%)  3. Limited fetal evaluation: no abnormalities appreciated - see above for details  4. BPP without NST = 8/8  5. Amniotic fluid volume wnl (MVP 5.4cm)    Patient has a sheet from home health will blood pressures taken during her pregnancy ranging from normal to mildly elevated. Most recent on  7/17/2017 was 126/90 mmhg.    Recommendation  ==============    1. Patient with elevated BP's in [facility] clinic: Patient sent to labor and delivery for further maternal/fetal evaluation and surveillance  (Dr. Alves notified)    2. If patient discharged home for continued outpatient care, Recommend fetal surveillance twice a week (NST twice a week and  weekly MVP) until delivery    3. We have scheduled patient for a sono in 4 wks for EFW, MVP if undelivered.

## 2017-07-19 NOTE — TELEPHONE ENCOUNTER
The lab had to cancel the Affirm because they ran out of the kits that are used to process the Affirms.

## 2017-07-19 NOTE — PLAN OF CARE
"1420- received to 360 from Dr Hickman"s office in [facility]. Pt was sent to r/o [facility]. Pt has history of high BP with 1st pregnancy and had 2 miscarriages in 2014 and had to use fertility drugs to conceive this pregnancy. Pt recently placed on Flagyl for bacterial vaginosis and has had an otherwise uncomplicated pregnancy except for nausea and vomiting daily.  1450- Dr Alves notified of pt's elevated BP's and DTR 3+/ no clonus/ and FHT with mod variability no accels no decels/ states will order PIH labs and IV fluids.  1500- pt informed of plan and voiced understanding.  1510- labs drawn by . Urine sent for protein creatinine ratio.   1520- IV started to lt hand- up 1000cc LR TBA at 500cc/hr for fluid bolus for no FHT accels at this time.  1615- pt having anxiety about contractions and no one checking her cervix/ labs back and wants to keep her for 24 hr urine due to elevated LDH. Pt wants to talk to Dr Alves. Dr Alves notified.  1720- Dr Alves here. SVE=1/60/-3/posterior cervix. Discussed plan to keep overnight and evaluate urine results and redraw labs in AM. Changed 24 hr urine to 12 hr urine.   1757- Pepcid 20mg IVP as ordered for c/o heartburn. Betamethasone 12mg IM to lt ventro- gluteal site. Regular diet tolerated well- ate half of meat gravy and rice and all sonam crackers. LR continues at 125cc/hr/ pt instructed on 12 hr urine and voiced understanding.  1840- pt c/o mild nausea. Cold wet cloth given to pt for neck and forehead . Pt denies need for nausea medication.  1850- report given to Steve CARPENTER.          "

## 2017-07-19 NOTE — TELEPHONE ENCOUNTER
----- Message from Ban Bronson sent at 7/18/2017  5:41 PM CDT -----  Contact: Ban Bronson  Please contact the Microbiology laboratory regarding specimen on the above patient.  Sample will need to be recollected.  Ask to speak with either Crystal Castro (microbiology lab Supervisor) at [phone] or Ban Bronson (microbiology Senior Tech) at [phone].

## 2017-07-20 ENCOUNTER — ANESTHESIA EVENT (OUTPATIENT)
Dept: OBSTETRICS AND GYNECOLOGY | Facility: HOSPITAL | Age: 37
End: 2017-07-20
Payer: COMMERCIAL

## 2017-07-20 LAB
ABO + RH BLD: NORMAL
ALBUMIN SERPL BCP-MCNC: 2.5 G/DL
ALP SERPL-CCNC: 122 U/L
ALT SERPL W/O P-5'-P-CCNC: 54 U/L
ANION GAP SERPL CALC-SCNC: 7 MMOL/L
AST SERPL-CCNC: 39 U/L
BASOPHILS # BLD AUTO: 0.01 K/UL
BASOPHILS # BLD AUTO: 0.01 K/UL
BASOPHILS NFR BLD: 0.1 %
BASOPHILS NFR BLD: 0.1 %
BILIRUB SERPL-MCNC: 0.5 MG/DL
BLD GP AB SCN CELLS X3 SERPL QL: NORMAL
BUN SERPL-MCNC: 5 MG/DL
CALCIUM SERPL-MCNC: 8.3 MG/DL
CHLORIDE SERPL-SCNC: 107 MMOL/L
CO2 SERPL-SCNC: 22 MMOL/L
CREAT SERPL-MCNC: 0.6 MG/DL
DIFFERENTIAL METHOD: ABNORMAL
DIFFERENTIAL METHOD: ABNORMAL
EOSINOPHIL # BLD AUTO: 0 K/UL
EOSINOPHIL # BLD AUTO: 0 K/UL
EOSINOPHIL NFR BLD: 0 %
EOSINOPHIL NFR BLD: 0.1 %
ERYTHROCYTE [DISTWIDTH] IN BLOOD BY AUTOMATED COUNT: 13.1 %
ERYTHROCYTE [DISTWIDTH] IN BLOOD BY AUTOMATED COUNT: 13.3 %
EST. GFR  (AFRICAN AMERICAN): >60 ML/MIN/1.73 M^2
EST. GFR  (NON AFRICAN AMERICAN): >60 ML/MIN/1.73 M^2
GLUCOSE SERPL-MCNC: 141 MG/DL
HCT VFR BLD AUTO: 31.8 %
HCT VFR BLD AUTO: 34.6 %
HGB BLD-MCNC: 10.4 G/DL
HGB BLD-MCNC: 11.2 G/DL
LYMPHOCYTES # BLD AUTO: 1 K/UL
LYMPHOCYTES # BLD AUTO: 1 K/UL
LYMPHOCYTES NFR BLD: 10.6 %
LYMPHOCYTES NFR BLD: 12.5 %
MCH RBC QN AUTO: 27.7 PG
MCH RBC QN AUTO: 28 PG
MCHC RBC AUTO-ENTMCNC: 32.4 G/DL
MCHC RBC AUTO-ENTMCNC: 32.7 G/DL
MCV RBC AUTO: 85 FL
MCV RBC AUTO: 86 FL
MONOCYTES # BLD AUTO: 0.2 K/UL
MONOCYTES # BLD AUTO: 0.3 K/UL
MONOCYTES NFR BLD: 2 %
MONOCYTES NFR BLD: 3.1 %
NEUTROPHILS # BLD AUTO: 6.5 K/UL
NEUTROPHILS # BLD AUTO: 7.8 K/UL
NEUTROPHILS NFR BLD: 84.5 %
NEUTROPHILS NFR BLD: 85.6 %
PLATELET # BLD AUTO: 175 K/UL
PLATELET # BLD AUTO: 208 K/UL
PMV BLD AUTO: 9.1 FL
PMV BLD AUTO: 9.1 FL
POTASSIUM SERPL-SCNC: 4.6 MMOL/L
PROT 24H UR-MRATE: NORMAL MG/SPEC
PROT SERPL-MCNC: 6.1 G/DL
PROT UR-MCNC: <7 MG/DL
RBC # BLD AUTO: 3.72 M/UL
RBC # BLD AUTO: 4.05 M/UL
SODIUM SERPL-SCNC: 136 MMOL/L
URINE COLLECTION DURATION: 12 HR
URINE VOLUME: 1450 ML
WBC # BLD AUTO: 7.65 K/UL
WBC # BLD AUTO: 9.07 K/UL

## 2017-07-20 PROCEDURE — 85025 COMPLETE CBC W/AUTO DIFF WBC: CPT | Mod: 91

## 2017-07-20 PROCEDURE — 86592 SYPHILIS TEST NON-TREP QUAL: CPT

## 2017-07-20 PROCEDURE — 86850 RBC ANTIBODY SCREEN: CPT

## 2017-07-20 PROCEDURE — 86900 BLOOD TYPING SEROLOGIC ABO: CPT

## 2017-07-20 PROCEDURE — 25000003 PHARM REV CODE 250: Performed by: OBSTETRICS & GYNECOLOGY

## 2017-07-20 PROCEDURE — 99232 SBSQ HOSP IP/OBS MODERATE 35: CPT | Mod: ,,, | Performed by: OBSTETRICS & GYNECOLOGY

## 2017-07-20 PROCEDURE — 36415 COLL VENOUS BLD VENIPUNCTURE: CPT

## 2017-07-20 PROCEDURE — 84156 ASSAY OF PROTEIN URINE: CPT

## 2017-07-20 PROCEDURE — 11000001 HC ACUTE MED/SURG PRIVATE ROOM

## 2017-07-20 PROCEDURE — 80053 COMPREHEN METABOLIC PANEL: CPT

## 2017-07-20 PROCEDURE — 63600175 PHARM REV CODE 636 W HCPCS: Performed by: OBSTETRICS & GYNECOLOGY

## 2017-07-20 RX ORDER — CLINDAMYCIN PHOSPHATE 900 MG/50ML
900 INJECTION, SOLUTION INTRAVENOUS
Status: DISCONTINUED | OUTPATIENT
Start: 2017-07-20 | End: 2017-07-20

## 2017-07-20 RX ORDER — FAMOTIDINE 10 MG/ML
20 INJECTION INTRAVENOUS
Status: DISCONTINUED | OUTPATIENT
Start: 2017-07-21 | End: 2017-07-25

## 2017-07-20 RX ORDER — BETAMETHASONE SODIUM PHOSPHATE AND BETAMETHASONE ACETATE 3; 3 MG/ML; MG/ML
12 INJECTION, SUSPENSION INTRA-ARTICULAR; INTRALESIONAL; INTRAMUSCULAR; SOFT TISSUE ONCE
Status: COMPLETED | OUTPATIENT
Start: 2017-07-20 | End: 2017-07-20

## 2017-07-20 RX ORDER — OXYTOCIN/RINGER'S LACTATE 20/1000 ML
333 PLASTIC BAG, INJECTION (ML) INTRAVENOUS
Status: ACTIVE | OUTPATIENT
Start: 2017-07-20 | End: 2017-07-20

## 2017-07-20 RX ORDER — SODIUM CHLORIDE, SODIUM LACTATE, POTASSIUM CHLORIDE, CALCIUM CHLORIDE 600; 310; 30; 20 MG/100ML; MG/100ML; MG/100ML; MG/100ML
INJECTION, SOLUTION INTRAVENOUS CONTINUOUS
Status: DISCONTINUED | OUTPATIENT
Start: 2017-07-20 | End: 2017-07-26 | Stop reason: HOSPADM

## 2017-07-20 RX ORDER — MISOPROSTOL 200 UG/1
800 TABLET ORAL
Status: DISCONTINUED | OUTPATIENT
Start: 2017-07-20 | End: 2017-07-26 | Stop reason: HOSPADM

## 2017-07-20 RX ORDER — CLINDAMYCIN PHOSPHATE 900 MG/50ML
900 INJECTION, SOLUTION INTRAVENOUS
Status: COMPLETED | OUTPATIENT
Start: 2017-07-21 | End: 2017-07-21

## 2017-07-20 RX ORDER — GENTAMICIN SULFATE 100 MG/100ML
100 INJECTION, SOLUTION INTRAVENOUS
Status: COMPLETED | OUTPATIENT
Start: 2017-07-21 | End: 2017-07-21

## 2017-07-20 RX ORDER — FAMOTIDINE 10 MG/ML
20 INJECTION INTRAVENOUS
Status: DISCONTINUED | OUTPATIENT
Start: 2017-07-20 | End: 2017-07-20

## 2017-07-20 RX ORDER — GENTAMICIN SULFATE 100 MG/100ML
100 INJECTION, SOLUTION INTRAVENOUS
Status: DISCONTINUED | OUTPATIENT
Start: 2017-07-20 | End: 2017-07-20

## 2017-07-20 RX ADMIN — SODIUM CHLORIDE, SODIUM LACTATE, POTASSIUM CHLORIDE, AND CALCIUM CHLORIDE: .6; .31; .03; .02 INJECTION, SOLUTION INTRAVENOUS at 08:07

## 2017-07-20 RX ADMIN — FAMOTIDINE 20 MG: 10 INJECTION, SOLUTION INTRAVENOUS at 08:07

## 2017-07-20 RX ADMIN — BETAMETHASONE SODIUM PHOSPHATE AND BETAMETHASONE ACETATE 12 MG: 3; 3 INJECTION, SUSPENSION INTRA-ARTICULAR; INTRALESIONAL; INTRAMUSCULAR at 08:07

## 2017-07-20 RX ADMIN — FAMOTIDINE 20 MG: 10 INJECTION, SOLUTION INTRAVENOUS at 09:07

## 2017-07-20 NOTE — PROGRESS NOTES
"HPI:   Crystal Hurley is a 36 y.o. female  at 35 weeks EGA who presented yesterday for further evaluation for pre-eclampsia superimposed on CHTN. Her blood pressures look better overall but her ALT was elevated. She was given steroids in anticipation of delivery. Her ALT has increased this morning so with history of CHTN and previous pregnancy with severe pre-eclampsia I feel it is in the best interest of patient to move forward with delivery once approximately 24 hours out from last dose of steroids. She denies headache, visual changes and RUQ pain.    ROS:  GENERAL: Denies weight gain or weight loss. Feeling well overall.   SKIN: Denies rash or lesions.   HEAD: Denies head injury or headache.   CHEST: Denies chest pain or shortness of breath.   CARDIOVASCULAR: Denies palpitations or left sided chest pain.   ABDOMEN: No abdominal pain, constipation, diarrhea, nausea, vomiting or rectal bleeding.   URINARY: No frequency, dysuria, hematuria, or burning on urination.  REPRODUCTIVE: See HPI.     Past Medical History:   Diagnosis Date    Anemia     Anxiety     Arthritis 10/2014    right hip    Bartholin's cyst     Elevated BP     Hypertension     Miscarriage      Past Surgical History:   Procedure Laterality Date     SECTION      DILATION AND CURETTAGE OF UTERUS  2014     Family History   Problem Relation Age of Onset    Breast cancer Neg Hx     Colon cancer Neg Hx     Ovarian cancer Neg Hx      Pcn [penicillins]       PE:   /76   Pulse 83   Temp 97.8 °F (36.6 °C) (Oral)   Resp 18   Ht 5' 7" (1.702 m)   Wt 75.3 kg (166 lb 0.1 oz)   LMP 2016 (Approximate)   SpO2 98%   Breastfeeding? No   BMI 26.00 kg/m²   APPEARANCE: Well nourished, well developed, in no acute distress.  CHEST: Lungs clear to auscultation.  HEART: Regular rate and rhythm, no murmurs, rubs or gallops.  ABDOMEN:  Gravid. NTND soft  EXTREMITIES: 3+ DTR's and negative edema  SVE yesterday: 1cm / 60% " very soft (history of PTD at 35 weeks)    Assessment:  IUP 35 weeks 3 days  CHTN  AMA  Super imposed pre-eclampsia  Category 1 Fetal Heart Tracing    Plan:   Repeat  tomorrow

## 2017-07-20 NOTE — PLAN OF CARE
1900- report received. Care assumed.   1915- no distress noted. Pt resting comfortably. Denies any pain at present. Initial assessment initiated. See flow sheet for completed and continuing assessment. POC discussed with pt. Pt agrees w/ POC. 12 hour urine in progress. Pt states understanding of urine collection.  0327- at pt bedside. Pt vomiting. Pt given home meds and crackers. Pt appears to have reief after taken meds

## 2017-07-20 NOTE — H&P
"HPI:   Crystal Hurley is a 36 y.o. female  at 35 weeks EGA who presents here from [facility] exam secondary to elevated blood pressure with a history of hypertension. A blood pressure series is obtained and there is a very slight elevation in one of her liver enzymes. Her history is significant for nausea and vomiting in this pregnancy,  delivery at 36 weeks, ama, chtn, bartholin's gland abscess. She has no symptoms of pre-eclampsia.    ROS:  GENERAL: Denies weight gain or weight loss. Feeling well overall.   SKIN: Denies rash or lesions.   HEAD: Denies head injury or headache.   CHEST: Denies chest pain or shortness of breath.   CARDIOVASCULAR: Denies palpitations or left sided chest pain.   ABDOMEN: No abdominal pain, constipation, diarrhea, nausea, vomiting (none while admitted-states she thinks the fluids have helped) or rectal bleeding.   URINARY: No frequency, dysuria, hematuria, or burning on urination.  REPRODUCTIVE: See HPI.     Past Medical History:   Diagnosis Date    Anemia     Anxiety     Arthritis 10/2014    right hip    Bartholin's cyst     Elevated BP     Hypertension     Miscarriage      Past Surgical History:   Procedure Laterality Date     SECTION      DILATION AND CURETTAGE OF UTERUS  2014     Family History   Problem Relation Age of Onset    Breast cancer Neg Hx     Colon cancer Neg Hx     Ovarian cancer Neg Hx      Pcn [penicillins]       PE:   /85 (BP Location: Right arm, Patient Position: Sitting, BP Method: Automatic)   Pulse 89   Temp 98.4 °F (36.9 °C) (Oral)   Resp 18   Ht 5' 7" (1.702 m)   Wt 75.3 kg (166 lb 0.1 oz)   LMP 2016 (Approximate)   SpO2 97%   Breastfeeding? No   BMI 26.00 kg/m²   APPEARANCE: Well nourished, well developed, in no acute distress.  CHEST: Lungs clear to auscultation.  HEART: Regular rate and rhythm, no murmurs, rubs or gallops.  ABDOMEN:  Gravid. NTND soft  SVE : 1/60%    Assessment:  IUP 35 weeks 2 " days  AMA  CHTN  PTD @ 36 weeks  Slight elevation in ALT at 50 (10-44) so could be secondary to daily n/v  Category 1 Fetal Heart Tracing    Plan:   Observe overnight  Steroids  Repeat lab in am

## 2017-07-21 ENCOUNTER — SURGERY (OUTPATIENT)
Age: 37
End: 2017-07-21

## 2017-07-21 ENCOUNTER — ANESTHESIA (OUTPATIENT)
Dept: OBSTETRICS AND GYNECOLOGY | Facility: HOSPITAL | Age: 37
End: 2017-07-21
Payer: COMMERCIAL

## 2017-07-21 PROBLEM — Z3A.35 35 WEEKS GESTATION OF PREGNANCY: Status: ACTIVE | Noted: 2017-07-21

## 2017-07-21 LAB — RPR SER QL: NORMAL

## 2017-07-21 PROCEDURE — 51702 INSERT TEMP BLADDER CATH: CPT

## 2017-07-21 PROCEDURE — 59510 CESAREAN DELIVERY: CPT | Mod: AT,,, | Performed by: OBSTETRICS & GYNECOLOGY

## 2017-07-21 PROCEDURE — 27200033

## 2017-07-21 PROCEDURE — 63600175 PHARM REV CODE 636 W HCPCS: Performed by: STUDENT IN AN ORGANIZED HEALTH CARE EDUCATION/TRAINING PROGRAM

## 2017-07-21 PROCEDURE — 72100003 HC LABOR CARE, EA. ADDL. 8 HRS

## 2017-07-21 PROCEDURE — 37000008 HC ANESTHESIA 1ST 15 MINUTES: Performed by: OBSTETRICS & GYNECOLOGY

## 2017-07-21 PROCEDURE — 25000003 PHARM REV CODE 250: Performed by: OBSTETRICS & GYNECOLOGY

## 2017-07-21 PROCEDURE — 72100002 HC LABOR CARE, 1ST 8 HOURS

## 2017-07-21 PROCEDURE — 37000009 HC ANESTHESIA EA ADD 15 MINS: Performed by: OBSTETRICS & GYNECOLOGY

## 2017-07-21 PROCEDURE — 25000003 PHARM REV CODE 250: Performed by: STUDENT IN AN ORGANIZED HEALTH CARE EDUCATION/TRAINING PROGRAM

## 2017-07-21 PROCEDURE — 36000685 HC CESAREAN SECTION LEVEL I

## 2017-07-21 PROCEDURE — 27201121 HC TRAY,SPINAL-HYPERBARIC: Performed by: STUDENT IN AN ORGANIZED HEALTH CARE EDUCATION/TRAINING PROGRAM

## 2017-07-21 PROCEDURE — 63600175 PHARM REV CODE 636 W HCPCS: Performed by: OBSTETRICS & GYNECOLOGY

## 2017-07-21 PROCEDURE — 11000001 HC ACUTE MED/SURG PRIVATE ROOM

## 2017-07-21 RX ORDER — OXYTOCIN 10 [USP'U]/ML
INJECTION, SOLUTION INTRAMUSCULAR; INTRAVENOUS
Status: DISCONTINUED | OUTPATIENT
Start: 2017-07-21 | End: 2017-07-21

## 2017-07-21 RX ORDER — OXYCODONE AND ACETAMINOPHEN 5; 325 MG/1; MG/1
1 TABLET ORAL EVERY 4 HOURS PRN
Status: DISCONTINUED | OUTPATIENT
Start: 2017-07-21 | End: 2017-07-26 | Stop reason: HOSPADM

## 2017-07-21 RX ORDER — DOCUSATE SODIUM 100 MG/1
200 CAPSULE, LIQUID FILLED ORAL 2 TIMES DAILY
Status: DISCONTINUED | OUTPATIENT
Start: 2017-07-21 | End: 2017-07-26 | Stop reason: HOSPADM

## 2017-07-21 RX ORDER — BUPIVACAINE HYDROCHLORIDE 2.5 MG/ML
30 INJECTION, SOLUTION EPIDURAL; INFILTRATION; INTRACAUDAL ONCE
Status: COMPLETED | OUTPATIENT
Start: 2017-07-21 | End: 2017-07-21

## 2017-07-21 RX ORDER — MISOPROSTOL 200 UG/1
200 TABLET ORAL EVERY 6 HOURS PRN
Status: DISCONTINUED | OUTPATIENT
Start: 2017-07-21 | End: 2017-07-26 | Stop reason: HOSPADM

## 2017-07-21 RX ORDER — OXYTOCIN/RINGER'S LACTATE 20/1000 ML
PLASTIC BAG, INJECTION (ML) INTRAVENOUS
Status: DISCONTINUED | OUTPATIENT
Start: 2017-07-21 | End: 2017-07-21

## 2017-07-21 RX ORDER — FAMOTIDINE 10 MG/ML
20 INJECTION INTRAVENOUS ONCE
Status: DISCONTINUED | OUTPATIENT
Start: 2017-07-21 | End: 2017-07-22

## 2017-07-21 RX ORDER — NAPROXEN 500 MG/1
500 TABLET ORAL EVERY 8 HOURS
Status: DISCONTINUED | OUTPATIENT
Start: 2017-07-21 | End: 2017-07-26 | Stop reason: HOSPADM

## 2017-07-21 RX ORDER — NALBUPHINE HYDROCHLORIDE 20 MG/ML
2.6 INJECTION, SOLUTION INTRAMUSCULAR; INTRAVENOUS; SUBCUTANEOUS
Status: DISCONTINUED | OUTPATIENT
Start: 2017-07-21 | End: 2017-07-26 | Stop reason: HOSPADM

## 2017-07-21 RX ORDER — ACETAMINOPHEN 500 MG
1000 TABLET ORAL EVERY 8 HOURS PRN
Status: ACTIVE | OUTPATIENT
Start: 2017-07-21 | End: 2017-07-22

## 2017-07-21 RX ORDER — ONDANSETRON 2 MG/ML
4 INJECTION INTRAMUSCULAR; INTRAVENOUS EVERY 12 HOURS PRN
Status: DISCONTINUED | OUTPATIENT
Start: 2017-07-21 | End: 2017-07-26 | Stop reason: HOSPADM

## 2017-07-21 RX ORDER — OXYCODONE AND ACETAMINOPHEN 5; 325 MG/1; MG/1
1 TABLET ORAL EVERY 4 HOURS PRN
Qty: 40 TABLET | Refills: 0 | Status: SHIPPED | OUTPATIENT
Start: 2017-07-21 | End: 2017-08-16

## 2017-07-21 RX ORDER — PROPOFOL 10 MG/ML
VIAL (ML) INTRAVENOUS
Status: DISCONTINUED | OUTPATIENT
Start: 2017-07-21 | End: 2017-07-21

## 2017-07-21 RX ORDER — PHENYLEPHRINE HYDROCHLORIDE 10 MG/ML
INJECTION INTRAVENOUS
Status: DISCONTINUED | OUTPATIENT
Start: 2017-07-21 | End: 2017-07-21

## 2017-07-21 RX ORDER — NALBUPHINE HYDROCHLORIDE 10 MG/ML
2.5 INJECTION, SOLUTION INTRAMUSCULAR; INTRAVENOUS; SUBCUTANEOUS ONCE
Status: CANCELLED | OUTPATIENT
Start: 2017-07-21 | End: 2017-07-21

## 2017-07-21 RX ORDER — FENTANYL CITRATE 50 UG/ML
INJECTION, SOLUTION INTRAMUSCULAR; INTRAVENOUS
Status: DISCONTINUED | OUTPATIENT
Start: 2017-07-21 | End: 2017-07-21

## 2017-07-21 RX ORDER — NAPROXEN 500 MG/1
500 TABLET ORAL 3 TIMES DAILY
Qty: 40 TABLET | Refills: 0 | Status: SHIPPED | OUTPATIENT
Start: 2017-07-21 | End: 2017-08-16

## 2017-07-21 RX ORDER — BISACODYL 10 MG
10 SUPPOSITORY, RECTAL RECTAL ONCE AS NEEDED
Status: ACTIVE | OUTPATIENT
Start: 2017-07-21 | End: 2017-07-21

## 2017-07-21 RX ORDER — SIMETHICONE 80 MG
1 TABLET,CHEWABLE ORAL EVERY 6 HOURS PRN
Status: DISCONTINUED | OUTPATIENT
Start: 2017-07-21 | End: 2017-07-26 | Stop reason: HOSPADM

## 2017-07-21 RX ORDER — NALBUPHINE HYDROCHLORIDE 20 MG/ML
20 INJECTION, SOLUTION INTRAMUSCULAR; INTRAVENOUS; SUBCUTANEOUS
Status: DISCONTINUED | OUTPATIENT
Start: 2017-07-21 | End: 2017-07-21

## 2017-07-21 RX ORDER — SODIUM CITRATE AND CITRIC ACID MONOHYDRATE 334; 500 MG/5ML; MG/5ML
30 SOLUTION ORAL ONCE
Status: COMPLETED | OUTPATIENT
Start: 2017-07-21 | End: 2017-07-21

## 2017-07-21 RX ORDER — OXYCODONE AND ACETAMINOPHEN 10; 325 MG/1; MG/1
1 TABLET ORAL EVERY 4 HOURS PRN
Status: DISCONTINUED | OUTPATIENT
Start: 2017-07-21 | End: 2017-07-26 | Stop reason: HOSPADM

## 2017-07-21 RX ORDER — OXYTOCIN/RINGER'S LACTATE 20/1000 ML
41.65 PLASTIC BAG, INJECTION (ML) INTRAVENOUS
Status: ACTIVE | OUTPATIENT
Start: 2017-07-21 | End: 2017-07-21

## 2017-07-21 RX ORDER — FAMOTIDINE 20 MG/1
20 TABLET, FILM COATED ORAL 2 TIMES DAILY
Status: CANCELLED | OUTPATIENT
Start: 2017-07-21

## 2017-07-21 RX ORDER — METOCLOPRAMIDE HYDROCHLORIDE 5 MG/ML
10 INJECTION INTRAMUSCULAR; INTRAVENOUS ONCE
Status: COMPLETED | OUTPATIENT
Start: 2017-07-21 | End: 2017-07-21

## 2017-07-21 RX ORDER — DIPHENHYDRAMINE HCL 25 MG
25 CAPSULE ORAL EVERY 4 HOURS PRN
Status: DISCONTINUED | OUTPATIENT
Start: 2017-07-21 | End: 2017-07-26 | Stop reason: HOSPADM

## 2017-07-21 RX ORDER — MORPHINE SULFATE 1 MG/ML
INJECTION, SOLUTION EPIDURAL; INTRATHECAL; INTRAVENOUS
Status: DISCONTINUED | OUTPATIENT
Start: 2017-07-21 | End: 2017-07-21

## 2017-07-21 RX ORDER — ADHESIVE BANDAGE
30 BANDAGE TOPICAL 2 TIMES DAILY PRN
Status: DISCONTINUED | OUTPATIENT
Start: 2017-07-22 | End: 2017-07-26 | Stop reason: HOSPADM

## 2017-07-21 RX ORDER — ONDANSETRON HYDROCHLORIDE 2 MG/ML
INJECTION, SOLUTION INTRAMUSCULAR; INTRAVENOUS
Status: DISCONTINUED | OUTPATIENT
Start: 2017-07-21 | End: 2017-07-21

## 2017-07-21 RX ORDER — MORPHINE SULFATE 2 MG/ML
2 INJECTION, SOLUTION INTRAMUSCULAR; INTRAVENOUS
Status: ACTIVE | OUTPATIENT
Start: 2017-07-21 | End: 2017-07-22

## 2017-07-21 RX ORDER — OXYTOCIN/RINGER'S LACTATE 20/1000 ML
PLASTIC BAG, INJECTION (ML) INTRAVENOUS
Status: DISPENSED
Start: 2017-07-21 | End: 2017-07-21

## 2017-07-21 RX ORDER — SODIUM CHLORIDE, SODIUM LACTATE, POTASSIUM CHLORIDE, CALCIUM CHLORIDE 600; 310; 30; 20 MG/100ML; MG/100ML; MG/100ML; MG/100ML
INJECTION, SOLUTION INTRAVENOUS CONTINUOUS
Status: DISCONTINUED | OUTPATIENT
Start: 2017-07-21 | End: 2017-07-25 | Stop reason: SDUPTHER

## 2017-07-21 RX ORDER — KETOROLAC TROMETHAMINE 30 MG/ML
INJECTION, SOLUTION INTRAMUSCULAR; INTRAVENOUS
Status: DISCONTINUED | OUTPATIENT
Start: 2017-07-21 | End: 2017-07-21

## 2017-07-21 RX ADMIN — CLINDAMYCIN PHOSPHATE 900 MG: 18 INJECTION, SOLUTION INTRAVENOUS at 07:07

## 2017-07-21 RX ADMIN — NALBUPHINE HYDROCHLORIDE 2.6 MG: 20 INJECTION, SOLUTION INTRAMUSCULAR; INTRAVENOUS; SUBCUTANEOUS at 09:07

## 2017-07-21 RX ADMIN — METOCLOPRAMIDE 10 MG: 5 INJECTION, SOLUTION INTRAMUSCULAR; INTRAVENOUS at 06:07

## 2017-07-21 RX ADMIN — PROPOFOL 20 MG: 10 INJECTION, EMULSION INTRAVENOUS at 08:07

## 2017-07-21 RX ADMIN — PROPOFOL 20 MG: 10 INJECTION, EMULSION INTRAVENOUS at 07:07

## 2017-07-21 RX ADMIN — KETOROLAC TROMETHAMINE 30 MG: 30 INJECTION, SOLUTION INTRAMUSCULAR; INTRAVENOUS at 07:07

## 2017-07-21 RX ADMIN — PHENYLEPHRINE HYDROCHLORIDE 100 MCG: 10 INJECTION INTRAVENOUS at 08:07

## 2017-07-21 RX ADMIN — Medication 20 UNITS: at 07:07

## 2017-07-21 RX ADMIN — OXYTOCIN 10 UNITS: 10 INJECTION, SOLUTION INTRAMUSCULAR; INTRAVENOUS at 07:07

## 2017-07-21 RX ADMIN — GENTAMICIN SULFATE 100 MG: 100 INJECTION, SOLUTION INTRAVENOUS at 07:07

## 2017-07-21 RX ADMIN — PHENYLEPHRINE HYDROCHLORIDE 100 MCG: 10 INJECTION INTRAVENOUS at 07:07

## 2017-07-21 RX ADMIN — SODIUM CHLORIDE, SODIUM LACTATE, POTASSIUM CHLORIDE, AND CALCIUM CHLORIDE: .6; .31; .03; .02 INJECTION, SOLUTION INTRAVENOUS at 07:07

## 2017-07-21 RX ADMIN — SODIUM CHLORIDE, SODIUM LACTATE, POTASSIUM CHLORIDE, AND CALCIUM CHLORIDE 1000 ML: .6; .31; .03; .02 INJECTION, SOLUTION INTRAVENOUS at 05:07

## 2017-07-21 RX ADMIN — Medication 41.65 MILLI-UNITS/MIN: at 08:07

## 2017-07-21 RX ADMIN — SODIUM CITRATE AND CITRIC ACID MONOHYDRATE 30 ML: 500; 334 SOLUTION ORAL at 06:07

## 2017-07-21 RX ADMIN — FAMOTIDINE 20 MG: 10 INJECTION, SOLUTION INTRAVENOUS at 09:07

## 2017-07-21 RX ADMIN — ONDANSETRON 4 MG: 2 INJECTION INTRAMUSCULAR; INTRAVENOUS at 11:07

## 2017-07-21 RX ADMIN — BUPIVACAINE HYDROCHLORIDE 75 MG: 2.5 INJECTION, SOLUTION EPIDURAL; INFILTRATION; INTRACAUDAL; PERINEURAL at 08:07

## 2017-07-21 RX ADMIN — ONDANSETRON 4 MG: 2 INJECTION, SOLUTION INTRAMUSCULAR; INTRAVENOUS at 07:07

## 2017-07-21 RX ADMIN — MORPHINE SULFATE 0.2 MG: 1 INJECTION, SOLUTION EPIDURAL; INTRATHECAL; INTRAVENOUS at 07:07

## 2017-07-21 RX ADMIN — NAPROXEN 500 MG: 500 TABLET ORAL at 09:07

## 2017-07-21 RX ADMIN — FENTANYL CITRATE 10 MCG: 50 INJECTION, SOLUTION INTRAMUSCULAR; INTRAVENOUS at 07:07

## 2017-07-21 RX ADMIN — FAMOTIDINE 20 MG: 10 INJECTION, SOLUTION INTRAVENOUS at 06:07

## 2017-07-21 RX ADMIN — DOCUSATE SODIUM 200 MG: 100 CAPSULE, LIQUID FILLED ORAL at 09:07

## 2017-07-21 NOTE — L&D DELIVERY NOTE
Date of Surgery: 17    Pre-Operative Diagnosis:   IUP 35 weeks  Previous   CHTN  AMA  Superimposed pre-eclampsia  Elevated LFT's  Fibroid    Post-Operative Diagnosis:   Same  Viable female infant  Nuchal cord x 1    Surgery:   Repeat LTCS    Surgeon: MD Betsy  Assistant: SIRI Longoria LPN    Anesthesia: Spinal and Local    EBL: approximately 800 cc (QBL pending)    Findings:   Normal bilateral tubes and ovaries.   Normal uterus except small 3cm fibroid  Viable female infant with 9/9 Apgars  Nuchal cord x 1      Specimens: None    Complications: None    With patient in supine position, the legs are  and Duffy Catheter placed and positioning to supine done.   Abdomen prepped with Chloroprep and 3 minute drying time allowed prior to draping of the abdomen.   Time out taken with OR team members.  Pfannenstiel Incision made through the skin, transverse fascial incision developed, rectus muscles  in the midline and the peritoneum entered.   no adhesions noted.  The lower uterine segment and position of the fetus identified.   Bladder flap taken down through transverse peritoneal incision.    Low Transverse Incision made through well developed lower uterine segment and extended laterally with blunt dissection.   Clear fluid noted.  Infant delivered from vertex presentation.  Cord clamped after one minute and  handed to attending nurse.  Cord blood taken, placenta delivered.  The uterus was exteriorized.  Closure with running lock 0 Chromic, starting at right angle and tied at the left angle. Observation for bleeding along the hysterotomy line. Excellent hemostasis was noted and Mei hemostatic agent was placed over hysterotomy site.    Uterus, right and left adnexa with normal anatomy.Closure of the rectus muscles with 0 Chromic suture in an interupted fashion. Fascial closure with 0 Vicryl starting at the right angle and tying the knot at the left angle. Bupivacaine was injected  under the fascial layer.  Skin closure with 4 0 Monocryl subcuticular.  Wound dressed with Dermaflex surgical glue.        Delivery Information for  Girl Crystal Hurley    Birth information:  YOB: 2016   Time of birth: 7:47 AM   Sex: female   Head Delivery Date/Time:     Delivery type:    Gestational Age: 35w4d               Assessment    No data filed                          Interventions/Resuscitation         Cord    No data filed              Labor Events:       labor:       Labor Onset Date/Time:         Dilation Complete Date/Time:         Start Pushing Date/Time:       Rupture Date/Time:              Rupture type:           Fluid Amount:        Fluid Color:        Fluid Odor:        Membrane Status (PeriCalm): INT (Intact)      Rupture Date/Time (PeriCalm):        Fluid Amount (PeriCalm):        Fluid Color (PeriCalm):         steroids:       Antibiotics given for GBS:       Induction:       Indications for induction:        Augmentation:       Indications for augmentation:       Labor complications:       Additional complications:          Cervical ripening:                     Delivery:      Episiotomy:       Indication for Episiotomy:       Perineal Lacerations:   Repaired:      Periurethral Laceration:   Repaired:     Labial Laceration:   Repaired:     Sulcus Laceration:   Repaired:     Vaginal Laceration:   Repaired:     Cervical Laceration:   Repaired:     Repair suture:       Repair # of packets:       Vaginal delivery QBL (mL):        QBL (mL): 0     Combined Blood Loss (mL): 0     Vaginal Sweep Performed:       Surgicount Correct:         Other providers:            Details (if applicable):  Trial of Labor      Categorization:      Priority:     Indications for :     Incision Type:       Additional  information:  Forceps:    Vacuum:    Breech:    Observed anomalies    Other (Comments):

## 2017-07-21 NOTE — PLAN OF CARE
Problem: Postpartum ( Delivery) (Adult,Obstetrics,Pediatric)  Goal: Signs and Symptoms of Listed Potential Problems Will be Absent, Minimized or Managed (Postpartum)  Signs and symptoms of listed potential problems will be absent, minimized or managed by discharge/transition of care (reference Postpartum ( Delivery) (Adult,Obstetrics,Pediatric) CPG).  Outcome: Ongoing (interventions implemented as appropriate)  POC discussed, pt verbalizes understanding no questions at present time.    Pain management options discussed with patient. Patient verbalizes understanding. Pt will report that pain management interventions will relieve pain to satisfactory level.    Educated patient on medication and their therapeutic affects as well as adverse reactions and side effects they may cause. Patient verbalizes understanding of medication.     Pt will be knowledgable about Recovery/Postpartum expectations. Patient educated on pain control, hydration, nutrition,pericare to decrease infection risk, and expected outcomes. Patient verbalizes understanding.

## 2017-07-21 NOTE — PLAN OF CARE
1900- Report received. Care assumed. No distress noted. Pt resting comfortably. Denies any pain at present. Initial assessment initiated. See flow sheet for completed and continuing assessment. Pt discontinued from monitoring to take a shower per MD order. Pt states will call for assistance if needed. Will continue monitor and update MD as needed.   2015- pt ambulating around unit. Denies any pain at present.  2110- pt returned to room. Back in bed. EFM and TOCO placed. Pt educated on POC. Pt reeducated on NPO after MN. Pt states understanding and agrees with POC.

## 2017-07-21 NOTE — LACTATION NOTE
07/21/17 1330   Maternal Infant Feeding   Maternal Preparation breast care   Maternal Emotional State relaxed   Presence of Pain no   Breast Milk Supply Volume (ml) (pumped at 1100;obtained 16ml colostrum per mom)   Time Spent (min) 15-30 min  (instructed on use/cleaning of pump/kit)   Breastfeeding Education adequate milk volume;increasing milk supply;label/storage of breast milk;milk expression, electric pump;milk expression, hand   Breastfeeding History   Currently Breastfeeding no   Breastfeeding History yes   Previous Breastfeeding Success successful   Duration of Previous Breastfeeding 18 months   Equipment Type/Education   Pump Type Symphony   Breast Pump Type double electric, hospital grade   Breast Pump Flange Type hard   Breast Pump Flange Size 24 mm  (may need larger size per mom;enc to call next pump to assess)   Pumping Frequency (times) (enc to pump/hand express 8+ times/24hrs)   Lactation Referrals   Lactation Consult Breast/nipple pain;Initial assessment;Knowledge deficit;Pump teaching   Lactation Interventions   Breastfeeding Assistance milk expression/pumping   Maternal Breastfeeding Support encouragement offered;lactation counseling provided;maternal rest encouraged

## 2017-07-21 NOTE — PLAN OF CARE
"Patient doing well been resting throughout the day. No nausea/vomitting. Walking around unit periodically, tolerating regular diet. Visiting with family. No questions or concerns.     1500 Spoke with Dr. Alves aware patient requested to walk the unit some, states "ok as long as patient has reactive NST before she walks." Aware takes about 30-45 minute break throughout the day to walk. Tolerating regular diet no n/v today.   "

## 2017-07-21 NOTE — LACTATION NOTE
07/21/17 1430   Maternal Infant Assessment   Breast Density Bilateral:;soft   Areola Bilateral:;elastic   Nipple(s) Bilateral:;everted   Nipple Width Bilateral:;other (see comments)  (slightly large in diameter)   Breasts WDL   Breasts WDL WDL   Pain/Comfort Assessments   Pain Assessment Performed Yes       Number Scale   Presence of Pain denies   Location - Side Bilateral   Location nipple(s)   Maternal Infant Feeding   Maternal Preparation breast care   Maternal Emotional State assist needed;relaxed  (w/ pumping;larger breast shield needed)   Presence of Pain no   Breast Milk Supply Volume (ml) (colostrum visible in containers after only few mins of pumpi)   Time Spent (min) 15-30 min   Breastfeeding Education adequate milk volume;importance of skin-to-skin contact;increasing milk supply;label/storage of breast milk;medication effects;milk expression, electric pump;milk expression, hand   Equipment Type/Education   Pump Type Symphony   Breast Pump Type double electric, hospital grade   Breast Pump Flange Type hard   Breast Pump Flange Size 27 mm   Breast Pumping Bilateral Breasts:;pumped until emptied   Lactation Referrals   Lactation Consult Breast/nipple pain;Follow up;Knowledge deficit;Pump teaching;Other (Comment)  (assisted with pumping)   Lactation Interventions   Attachment Promotion counseling provided;skin-to-skin contact encouraged   Breastfeeding Assistance electric breast pump used;milk expression/pumping;support offered   Maternal Breastfeeding Support diary/feeding log utilized;encouragement offered;lactation counseling provided;maternal hydration promoted;maternal rest encouraged

## 2017-07-21 NOTE — ANESTHESIA PROCEDURE NOTES
Spinal    Diagnosis: Pregnancy,   Patient location during procedure: OR  Start time: 2017 7:20 AM  Timeout: 2017 7:15 AM  End time: 2017 7:35 AM  Staffing  Anesthesiologist: AMRIT OLIVER  Resident/CRNA: RADHA GRIFFIN  Performed: resident/CRNA   Preanesthetic Checklist  Completed: patient identified, site marked, surgical consent, pre-op evaluation, timeout performed, IV checked, risks and benefits discussed and monitors and equipment checked  Spinal Block  Patient position: sitting  Prep: ChloraPrep  Patient monitoring: heart rate and continuous pulse ox  Approach: midline  Location: L3-4  Injection technique: single shot  CSF Fluid: clear free-flowing CSF  Needle  Needle type: pencil-tip   Needle gauge: 25 G  Needle length: 3.5 in  Additional Documentation: negative aspiration for heme  Needle localization: anatomical landmarks  Assessment  Sensory level: T6   Dermatomal levels determined by alcohol wipe  Ease of block: easy  Patient's tolerance of the procedure: comfortable throughout block and no complaints  Medications:  Bolus administered: 1.6 mL of 0.75 bupivacaine  Opioid administered: 10 mcg of   fentanyl

## 2017-07-21 NOTE — ANESTHESIA PREPROCEDURE EVALUATION
2017  Crystal Hurley is a 36 y.o., female 35wk4d for repeate C/S.    PRIOR ANES   2014 D&C GA   [mid , fent 150, prop 200 => ->100]   [sevo]   [RSI ETT 7.0, Valladares#2, Grade I, atraumatic]  C/S  epidural failed -> spinal successful    ANES-RELATED MED/SURG  HTN of pregnancy hx  PONV  Compound heterozygous MTHFR mutation [id number]/[id number]    Past Surgical History:   Procedure Laterality Date     SECTION      DILATION AND CURETTAGE OF UTERUS  2014     ALLERGIES  Review of patient's allergies indicates:   Allergen Reactions    Pcn [penicillins] Rash       ANES-RELATED HOME Rx  none    Anesthesia Evaluation         Review of Systems  Anesthesia Hx:  History of prior surgery of interest to airway management or planning: Personal Hx of Anesthesia complications, Post-Operative Nausea/Vomiting   Hematology/Oncology:         -- Anemia (of pregnancy):   EENT/Dental:EENT/Dental Normal   Cardiovascular:   Hypertension (w pregnancy)   Pulmonary:  Pulmonary Normal    Hepatic/GI:   Nausea during pregnancy   Musculoskeletal:  Musculoskeletal Normal    Endocrine:  Endocrine Normal                                                                                                                 2017  Crystal Hurley is a 36 y.o., female here for D+C.    [facility] Anesthesia Evaluation    I have reviewed the Patient Summary Reports.    I have reviewed the Nursing Notes.      Review of Systems  Anesthesia Hx:  No problems with previous Anesthesia  History of prior surgery of interest to airway management or planning: Denies Family Hx of Anesthesia complications.   Denies Personal Hx of Anesthesia complications.   Social:  Non-Smoker and No Alcohol Use    Hematology/Oncology:        Hematology Comments: Pt experiencing bleeding. Will receive blood transfusion.   Cardiovascular:  Cardiovascular  Normal Exercise tolerance: good     Pulmonary:   Pt report recent post nasal drip.   Renal/:  Renal/ Normal     Hepatic/GI:  Hepatic/GI Normal    OB/PEDS:  Miscarriage.   Neurological:  Neurology Normal    Endocrine:  Endocrine Normal      Wt Readings from Last 1 Encounters:   07/19/17 75.3 kg (166 lb 0.1 oz)     Temp Readings from Last 1 Encounters:   07/21/17 36.7 °C (98 °F) (Oral)     BP Readings from Last 1 Encounters:   07/21/17 121/82     Pulse Readings from Last 1 Encounters:   07/21/17 88     SpO2 Readings from Last 1 Encounters:   07/21/17 100%       Physical Exam  General:  Well nourished    Airway/Jaw/Neck:  Airway Findings: Mouth Opening: Normal Tongue: Normal  General Airway Assessment: Adult  Mallampati: I  TM Distance: Normal, at least 6 cm      Dental:  Dental Findings: In tact   Chest/Lungs:  Chest/Lungs Findings: Clear to auscultation     Heart/Vascular:  Heart Findings: Rate: Tachycardia  Rhythm: Regular Rhythm         Lab Results   Component Value Date    WBC 9.07 07/20/2017    HGB 11.2 (L) 07/20/2017    HCT 34.6 (L) 07/20/2017    MCV 85 07/20/2017     07/20/2017       Chemistry        Component Value Date/Time     07/20/2017 0527    K 4.6 07/20/2017 0527     07/20/2017 0527    CO2 22 (L) 07/20/2017 0527    BUN 5 (L) 07/20/2017 0527    CREATININE 0.6 07/20/2017 0527     (H) 07/20/2017 0527        Component Value Date/Time    CALCIUM 8.3 (L) 07/20/2017 0527    ALKPHOS 122 07/20/2017 0527    AST 39 07/20/2017 0527    ALT 54 (H) 07/20/2017 0527    BILITOT 0.5 07/20/2017 0527    ESTGFRAFRICA >60 07/20/2017 0527    EGFRNONAA >60 07/20/2017 0527        Lab Results   Component Value Date    ALBUMIN 2.5 (L) 07/20/2017     Lab Results   Component Value Date    TSH 1.256 03/13/2014     BLOOD BANK 2017-07-20  A+  D[facility]ell -    CXR      EKG      ECHO      Anesthesia Plan  Type of Anesthesia, risks & benefits discussed:  Anesthesia Type:  general  Patient's Preference:   Post Op  Pain Control Plan:   Induction:   IV  Informed Consent: Patient understands risks and agrees with Anesthesia plan.  Questions answered. Anesthesia consent signed with patient.  ASA Score: 1  emergent   Day of Surgery Review of History & Physical:  and have interviewed and examined the patient.   H&P update referred to the surgeon.    Anesthesia Plan Notes: Pt seen and examined.        Ready For Surgery From Anesthesia Perspective.          Physical Exam  General:  Well nourished    Airway/Jaw/Neck:  AIRWAY FINDINGS: Normal      Eyes/Ears/Nose:  EYES/EARS/NOSE FINDINGS: Normal   Dental:  DENTAL FINDINGS: Normal   Chest/Lungs:  Chest/Lungs Clear    Heart/Vascular:  Heart Findings: Normal Heart murmur: negative    Abdomen:  Abdomen Findings: (near full-term pregnancy)  Abdomnal Mass       Mental Status:  Mental Status Findings: Normal        Anesthesia Plan  Type of Anesthesia, risks & benefits discussed:  Anesthesia Type:  spinal  Patient's Preference:   Intra-op Monitoring Plan:   Intra-op Monitoring Plan Comments:   Post Op Pain Control Plan:   Post Op Pain Control Plan Comments:   Induction:    Beta Blocker:  Patient is not currently on a Beta-Blocker (No further documentation required).       Informed Consent: Patient understands risks and agrees with Anesthesia plan.  Questions answered. Anesthesia consent signed with patient.  ASA Score: 2  emergent   Day of Surgery Review of History & Physical:        Anesthesia Plan Notes: 2017-07-21   - PONV   - failed epidural (1-sided)        Ready For Surgery From Anesthesia Perspective.

## 2017-07-21 NOTE — INTERVAL H&P NOTE
The patient has been examined and the H&P has been reviewed:    I concur with the findings and changes have been noted since the H&P was written: The patient had increasing level of ALT and very brisk deep tendon reflexes. Will proceed with  for superimposed pre-eclampsia    Anesthesia/Surgery risks, benefits and alternative options discussed and understood by patient/family.          Active Hospital Problems    Diagnosis  POA    Hypertension affecting pregnancy in third trimester [O16.3]  Yes    Pre-existing hypertension during pregnancy, antepartum [O10.919]  Unknown      Resolved Hospital Problems    Diagnosis Date Resolved POA   No resolved problems to display.

## 2017-07-22 LAB
BASOPHILS # BLD AUTO: 0.03 K/UL
BASOPHILS NFR BLD: 0.2 %
DIFFERENTIAL METHOD: ABNORMAL
EOSINOPHIL # BLD AUTO: 0.1 K/UL
EOSINOPHIL NFR BLD: 0.6 %
ERYTHROCYTE [DISTWIDTH] IN BLOOD BY AUTOMATED COUNT: 13.4 %
HCT VFR BLD AUTO: 29.9 %
HGB BLD-MCNC: 9.8 G/DL
LYMPHOCYTES # BLD AUTO: 2.2 K/UL
LYMPHOCYTES NFR BLD: 14.1 %
MCH RBC QN AUTO: 28.2 PG
MCHC RBC AUTO-ENTMCNC: 32.8 G/DL
MCV RBC AUTO: 86 FL
MONOCYTES # BLD AUTO: 1.3 K/UL
MONOCYTES NFR BLD: 8.1 %
NEUTROPHILS # BLD AUTO: 12.1 K/UL
NEUTROPHILS NFR BLD: 77 %
PLATELET # BLD AUTO: 201 K/UL
PMV BLD AUTO: 8.9 FL
RBC # BLD AUTO: 3.47 M/UL
WBC # BLD AUTO: 15.84 K/UL

## 2017-07-22 PROCEDURE — 25000003 PHARM REV CODE 250: Performed by: OBSTETRICS & GYNECOLOGY

## 2017-07-22 PROCEDURE — 11000001 HC ACUTE MED/SURG PRIVATE ROOM

## 2017-07-22 PROCEDURE — 85025 COMPLETE CBC W/AUTO DIFF WBC: CPT

## 2017-07-22 PROCEDURE — 36415 COLL VENOUS BLD VENIPUNCTURE: CPT

## 2017-07-22 RX ORDER — FAMOTIDINE 20 MG/1
20 TABLET, FILM COATED ORAL 2 TIMES DAILY
Status: DISCONTINUED | OUTPATIENT
Start: 2017-07-22 | End: 2017-07-26 | Stop reason: HOSPADM

## 2017-07-22 RX ADMIN — NAPROXEN 500 MG: 500 TABLET ORAL at 02:07

## 2017-07-22 RX ADMIN — FAMOTIDINE 20 MG: 10 INJECTION, SOLUTION INTRAVENOUS at 08:07

## 2017-07-22 RX ADMIN — DOCUSATE SODIUM 200 MG: 100 CAPSULE, LIQUID FILLED ORAL at 08:07

## 2017-07-22 RX ADMIN — FAMOTIDINE 20 MG: 20 TABLET, FILM COATED ORAL at 09:07

## 2017-07-22 RX ADMIN — NAPROXEN 500 MG: 500 TABLET ORAL at 09:07

## 2017-07-22 RX ADMIN — DOCUSATE SODIUM 200 MG: 100 CAPSULE, LIQUID FILLED ORAL at 09:07

## 2017-07-22 RX ADMIN — NAPROXEN 500 MG: 500 TABLET ORAL at 05:07

## 2017-07-22 NOTE — PROGRESS NOTES
Delivery Progress Note  Obstetrics        SUBJECTIVE:     Postpartum Day 2:  Delivery    Ms. Michael Hurley states she feels well. She denies emotional concerns. Her pain is well controlled with current medications. The baby is well. The baby is feeding via bottle. The patient is ambulating well. Ms. Michael Hurley is tolerating a normal diet. Flatus has been passed. Urinary output is adequate.    OBJECTIVE:     Vital Signs (Most Recent):  Temp: 97.5 °F (36.4 °C) (17 0200)  Pulse: 69 (17)  Resp: 18 (17)  BP: 129/86 (17)  SpO2: 100 % (17)    Vital Signs Range (Last 24H):  Temp:  [97.4 °F (36.3 °C)-98.5 °F (36.9 °C)]   Pulse:  [67-88]   Resp:  [18]   BP: (115-150)/(74-88)   SpO2:  [95 %-100 %]     I & O (Last 24H):  Intake/Output Summary (Last 24 hours) at 17 0857  Last data filed at 17 0400   Gross per 24 hour   Intake                0 ml   Output             2750 ml   Net            -2750 ml     Physical Exam:  General:    no distress   Lungs:  clear to auscultation bilaterally   Heart:  regular rate and rhythm, S1, S2 normal, no murmur, click, rub or gallop   Breasts:  no discharge, erythema, or tenderness   Abdomen:  soft, non-tender; bowel sounds normal   Fundus:  firm   Incision:  healing well   Lochia:   scant serosa   DVT Evaluation:  No evidence of DVT on either side seen on physical exam.     Hemoglobin/Hematocrit    Recent Labs  Lab 17  0420   HGB 9.8*   HCT 29.9*     ABO/Rh  Lab Results   Component Value Date    GROUPTRH A POS 2017     Rubella  No results for input(s): RUBELLAIGGSC in the last 168 hours.    ASSESSMENT/PLAN:     Status post  section. Doing well postoperatively.     Continue current care.

## 2017-07-22 NOTE — LACTATION NOTE
07/22/17 1350   Maternal Infant Assessment   Breast Density Bilateral:;soft;filling   Nipple(s) Bilateral:;everted   Nipple Width Bilateral:;other (see comments)  (slightly large)   Nipple Symptoms bilateral:;redness;tender   Breasts WDL   Breasts WDL WDL   Pain/Comfort Assessments   Pain Assessment Performed Yes       Number Scale   Presence of Pain complains of pain/discomfort   Location - Side Bilateral   Location nipple(s)   Pain Rating: Activity 1   Factors that Aggravate Pain other (see comments)  (pumping)   Factors that Relieve Pain other (see comments)  (colostrum;lanolin)   Maternal Infant Feeding   Maternal Preparation breast care;hand hygiene   Maternal Emotional State relaxed   Presence of Pain yes   Pain Location nipples, bilateral   Pain Description soreness   Time Spent (min) 0-15 min   Engorgement Measures complete emptying encouraged   Breastfeeding Education adequate milk volume;increasing milk supply;label/storage of breast milk;milk expression, electric pump;milk expression, hand   Equipment Type/Education   Pump Type Symphony   Breast Pump Type double electric, hospital grade   Breast Pump Flange Type hard   Breast Pump Flange Size 27 mm   Pumping Frequency (times) (enc to pump/hand express 8+times/24hrs)   Lactation Referrals   Lactation Consult Breast/nipple pain;Follow up;Knowledge deficit;Pump teaching   Lactation Interventions   Breast Care: Breastfeeding lanolin to nipple(s) applied   Breastfeeding Assistance milk expression/pumping   Maternal Breastfeeding Support encouragement offered;lactation counseling provided;maternal rest encouraged

## 2017-07-22 NOTE — PLAN OF CARE
0700 - assumed care of pt    0830 - vss, nad, pain well controlled, tolerating regular diet, pumping and bringing colostrum/breastmilk to NICU.  POC: pain management, continue to monitor.  Reviewed POC w/pt.  Pt verbalized understanding.

## 2017-07-22 NOTE — ANESTHESIA POSTPROCEDURE EVALUATION
"Anesthesia Post Evaluation    Patient: Crystal Hurley    Procedure(s) Performed: Procedure(s) (LRB):  DELIVERY-CEASAREAN SECTION (N/A)    Final Anesthesia Type: spinal  Patient location during evaluation: labor & delivery  Patient participation: Yes- Able to Participate  Level of consciousness: awake and alert and oriented  Post-procedure vital signs: reviewed and stable  Pain management: adequate  Airway patency: patent  PONV status at discharge: No PONV  Anesthetic complications: no      Cardiovascular status: blood pressure returned to baseline and hemodynamically stable  Respiratory status: unassisted, spontaneous ventilation and room air  Hydration status: euvolemic  Follow-up not needed.        Visit Vitals  /86 (BP Location: Left arm, Patient Position: Lying, BP Method: Automatic)   Pulse 69   Temp 36.4 °C (97.5 °F) (Oral)   Resp 18   Ht 5' 7" (1.702 m)   Wt 75.3 kg (166 lb 0.1 oz)   LMP 07/08/2016 (Approximate)   SpO2 100%   Breastfeeding? Yes   BMI 26.00 kg/m²       Pain/Vero Score: Pain Rating Prior to Med Admin: 5 (7/22/2017  5:55 AM)    No catheter in back  No headache/neckache/backache  Full return of neurological function  Able to urinate  "

## 2017-07-22 NOTE — PLAN OF CARE
Problem: Patient Care Overview  Goal: Plan of Care Review  Outcome: Ongoing (interventions implemented as appropriate)  Mom will continue to pump at least 8+ times/24 hrs with symphony breast pump as instructed to provide colostrum to  baby in NICU. Will hand express after pumping. Will collect, label & store EBM as instructed. Will call for any needs.

## 2017-07-23 PROCEDURE — 25000003 PHARM REV CODE 250: Performed by: OBSTETRICS & GYNECOLOGY

## 2017-07-23 PROCEDURE — 11000001 HC ACUTE MED/SURG PRIVATE ROOM

## 2017-07-23 RX ORDER — OXYCODONE AND ACETAMINOPHEN 5; 325 MG/1; MG/1
1 TABLET ORAL EVERY 4 HOURS PRN
Qty: 24 TABLET | Refills: 0 | Status: SHIPPED | OUTPATIENT
Start: 2017-07-23 | End: 2017-07-23

## 2017-07-23 RX ORDER — OXYCODONE AND ACETAMINOPHEN 5; 325 MG/1; MG/1
1 TABLET ORAL EVERY 4 HOURS PRN
Qty: 24 TABLET | Refills: 0 | Status: SHIPPED | OUTPATIENT
Start: 2017-07-23 | End: 2017-07-27

## 2017-07-23 RX ORDER — ALPRAZOLAM 0.25 MG/1
0.25 TABLET ORAL 2 TIMES DAILY PRN
Status: DISCONTINUED | OUTPATIENT
Start: 2017-07-23 | End: 2017-07-26 | Stop reason: HOSPADM

## 2017-07-23 RX ADMIN — FAMOTIDINE 20 MG: 20 TABLET, FILM COATED ORAL at 10:07

## 2017-07-23 RX ADMIN — ALPRAZOLAM 0.25 MG: 0.25 TABLET ORAL at 03:07

## 2017-07-23 RX ADMIN — DOCUSATE SODIUM 200 MG: 100 CAPSULE, LIQUID FILLED ORAL at 10:07

## 2017-07-23 RX ADMIN — NAPROXEN 500 MG: 500 TABLET ORAL at 02:07

## 2017-07-23 RX ADMIN — NAPROXEN 500 MG: 500 TABLET ORAL at 10:07

## 2017-07-23 RX ADMIN — NAPROXEN 500 MG: 500 TABLET ORAL at 05:07

## 2017-07-23 NOTE — PLAN OF CARE
2100 - Pt up to NICU. Will medicate with scheduled PO pain meds as ordered. No other needs or concerns voiced. Will monitor.     0000 - NICU at baby's cribside.     0300 - Dad at baby's cribside.     0600 - NICU cribside assisting with feeding infant.

## 2017-07-23 NOTE — PLAN OF CARE
Problem: Patient Care Overview  Goal: Plan of Care Review  Outcome: Ongoing (interventions implemented as appropriate)  Mom will continue to pump/hand express at least 8+ times/24 hrs with symphony breast pump as instructed. Will collect, label & store EBM as instructed. Discussed tips for putting baby to breast-may be able to BR this evening if continues to do well with PO feeds. Will use gel pads as instructed to decrease nipple soreness. Will call for any needs.

## 2017-07-23 NOTE — PLAN OF CARE
0700 - assumed care of pt    0830 - pt in NICU visiting infant    0930 -  VSS, NAD, denies pain at this time, tolerating regular diet, passing gas.  POC: pain management, continue to monitor.  Reviewed POC w/pt.  Pt verbalized understanding.    1100 - informed MD Lawler about pt's BP.  No new orders written.  Will continue to monitor.    1400 - pt's /101.  Pt asymptomatic.  Pt reports feeling anxious and stressed about being discharged tomorrow and having to leave her baby who is currently in the NICU.  Notified MD Lawler.  Xanax 0.25mg BID PRN ordered.  Will continue to monitor.    1700 - Pt's /99, asymptomatic.  Pt reports feeling overwhelmed w/blood pressure checks and baby's NICU status.  Notified MD Lawler.  No new orders written.  Will continue to monitor.  Informed pt to notify nurse if she experiences severe headache, blurry vision, dizzy, n/v, sob.  Pt verbalized understanding.

## 2017-07-23 NOTE — PROGRESS NOTES
Delivery Progress Note  Obstetrics        SUBJECTIVE:     Postpartum Day 3  Delivery    Ms. Michael Hurley states she feels well. She denies emotional concerns. Her pain is well controlled with current medications. The baby is well. The baby is feeding via bottle. The patient is ambulating well. Ms. Michael Hurley is tolerating a normal diet. Flatus has been passed. Urinary output is adequate.    OBJECTIVE:     Vital Signs (Most Recent):  Temp: 98.3 °F (36.8 °C) (17 0000)  Pulse: 84 (17 0000)  Resp: 18 (17 0000)  BP: 132/84 (17 0000)  SpO2: 99 % (17 1600)    Vital Signs Range (Last 24H):  Temp:  [98.3 °F (36.8 °C)-98.5 °F (36.9 °C)]   Pulse:  [84-86]   Resp:  [18]   BP: (128-132)/(84-88)   SpO2:  [99 %]     I & O (Last 24H):No intake or output data in the 24 hours ending 17 0926  Physical Exam:  General:    no distress   Lungs:  clear to auscultation bilaterally   Heart:  regular rate and rhythm, S1, S2 normal, no murmur, click, rub or gallop   Breasts:  no discharge, erythema, or tenderness   Abdomen:  soft, non-tender; bowel sounds normal   Fundus:  firm   Incision:  healing well   Lochia:   scant serosa   DVT Evaluation:  No evidence of DVT on either side seen on physical exam.     Hemoglobin/Hematocrit    Recent Labs  Lab 17  0420   HGB 9.8*   HCT 29.9*     ABO/Rh  Lab Results   Component Value Date    GROUPTRH A POS 2017     Rubella  No results for input(s): RUBELLAIGGSC in the last 168 hours.    ASSESSMENT/PLAN:     Status post  section. Doing well postoperatively.     Continue current care.

## 2017-07-23 NOTE — PLAN OF CARE
Problem: Patient Care Overview  Goal: Plan of Care Review  Outcome: Ongoing (interventions implemented as appropriate)  Pt pumping well and ambulates without difficulty to NICU. Touches and talks to infant. Appropriate bonding with in reason at this time. Voiding and + flatus. Comfortable with po pain meds.

## 2017-07-24 PROCEDURE — 25000003 PHARM REV CODE 250: Performed by: OBSTETRICS & GYNECOLOGY

## 2017-07-24 PROCEDURE — 63600175 PHARM REV CODE 636 W HCPCS: Performed by: OBSTETRICS & GYNECOLOGY

## 2017-07-24 PROCEDURE — 11000001 HC ACUTE MED/SURG PRIVATE ROOM

## 2017-07-24 RX ORDER — ADHESIVE BANDAGE
30 BANDAGE TOPICAL NIGHTLY
Status: ACTIVE | OUTPATIENT
Start: 2017-07-24 | End: 2017-07-25

## 2017-07-24 RX ORDER — HYDRALAZINE HYDROCHLORIDE 20 MG/ML
10 INJECTION INTRAMUSCULAR; INTRAVENOUS EVERY 4 HOURS PRN
Status: DISCONTINUED | OUTPATIENT
Start: 2017-07-24 | End: 2017-07-26 | Stop reason: HOSPADM

## 2017-07-24 RX ORDER — ALPRAZOLAM 0.25 MG/1
0.25 TABLET ORAL 2 TIMES DAILY PRN
Qty: 30 TABLET | Refills: 0 | Status: SHIPPED | OUTPATIENT
Start: 2017-07-24 | End: 2017-08-16

## 2017-07-24 RX ORDER — NIFEDIPINE 30 MG/1
30 TABLET, EXTENDED RELEASE ORAL ONCE
Status: COMPLETED | OUTPATIENT
Start: 2017-07-24 | End: 2017-07-24

## 2017-07-24 RX ORDER — NIFEDIPINE 30 MG/1
30 TABLET, EXTENDED RELEASE ORAL DAILY
Status: DISCONTINUED | OUTPATIENT
Start: 2017-07-25 | End: 2017-07-25

## 2017-07-24 RX ORDER — HYDRALAZINE HYDROCHLORIDE 20 MG/ML
10 INJECTION INTRAMUSCULAR; INTRAVENOUS ONCE
Status: COMPLETED | OUTPATIENT
Start: 2017-07-24 | End: 2017-07-24

## 2017-07-24 RX ADMIN — FAMOTIDINE 20 MG: 20 TABLET, FILM COATED ORAL at 08:07

## 2017-07-24 RX ADMIN — NAPROXEN 500 MG: 500 TABLET ORAL at 02:07

## 2017-07-24 RX ADMIN — HYDRALAZINE HYDROCHLORIDE 10 MG: 20 INJECTION INTRAMUSCULAR; INTRAVENOUS at 08:07

## 2017-07-24 RX ADMIN — DOCUSATE SODIUM 200 MG: 100 CAPSULE, LIQUID FILLED ORAL at 09:07

## 2017-07-24 RX ADMIN — FAMOTIDINE 20 MG: 20 TABLET, FILM COATED ORAL at 09:07

## 2017-07-24 RX ADMIN — NAPROXEN 500 MG: 500 TABLET ORAL at 05:07

## 2017-07-24 RX ADMIN — NAPROXEN 500 MG: 500 TABLET ORAL at 11:07

## 2017-07-24 RX ADMIN — NIFEDIPINE 30 MG: 30 TABLET, FILM COATED, EXTENDED RELEASE ORAL at 12:07

## 2017-07-24 RX ADMIN — DOCUSATE SODIUM 200 MG: 100 CAPSULE, LIQUID FILLED ORAL at 08:07

## 2017-07-24 NOTE — PROGRESS NOTES
"POD # 3 s/p Repeat  for superimposed Pre-eclampsia    Subjective: No complaints. No symptoms of pre-eclampsia. Positive flatus. No BM. Breast feeding. Would like to stay another night to watch blood pressures unless insurance does not allow her to stay then she will room in.    Objective: BP (!) 148/89 (BP Location: Left arm, Patient Position: Lying, BP Method: Automatic)   Pulse 91   Temp 98.2 °F (36.8 °C) (Oral)   Resp 18   Ht 5' 7" (1.702 m)   Wt 75.3 kg (166 lb 0.1 oz)   LMP 2016 (Approximate)   SpO2 99%   Breastfeeding? Yes   BMI 26.00 kg/m²     H/H:   Lab Results   Component Value Date    WBC 15.84 (H) 2017    HGB 9.8 (L) 2017    HCT 29.9 (L) 2017    MCV 86 2017     2017         Chest: Clear to auscultation  CV: Regular rate and rhythm  Abdomen: Non-tender, non-distended, soft, positive bowel sounds  Incision: Clean, dry, intact with ecchymosis  Extremities: Non-tender, no edema DTR's 2+    Assessment: S/P  for superimposed pre-eclampsia     Plan: Discharge home today or tomorrow. Follow up on Friday as scheduled.   "

## 2017-07-24 NOTE — LACTATION NOTE
"   07/24/17 0920   Infant Information   Infant's Name Hortencia   Infant's Medical Care Provider Miguel Ángel/Chanell Astudillo   Maternal Infant Assessment   Breast Size Issue none   Breast Shape Bilateral:;pendulous   Breast Density Bilateral:;full  (per pt.)   Areola Bilateral:;elastic   Nipple(s) Bilateral:;everted   Nipple Symptoms bilateral:;redness;tender   Infant Assessment   Mouth Size average   Sucking Reflex other (see comments)  (baby very sleepy, does not latch and sustain latch)   Rooting Reflex present   Swallow Reflex other (see comments)   LATCH Score   Latch 1-->repeated attempts, holds nipple in mouth, stimulate to suck   Audible Swallowing 1-->a few with stimulation   Type Of Nipple 2-->everted (after stimulation)   Comfort (Breast/Nipple) 1-->filling, red/small blisters/bruises, mild/mod discomfort   Hold (Positioning) 1-->minimal assist, teach one side: mother does other, staff holds   Score (less than 7 for 2/more consecutive times, consult Lactation Consultant) 6   Pain/Comfort Assessments   Pain Assessment Performed Yes       Number Scale   Presence of Pain denies  (denies pain to nipples or breast at this time)   Location - Side Bilateral   Location nipple(s)   Pain Rating: Rest 0   Pain Rating: Activity 0   Maternal Infant Feeding   Maternal Preparation breast care;hand hygiene   Maternal Emotional State relaxed   Infant Positioning clutch/"football"  (right FB hold,does not susain latch,mom expressing in mouth)   Presence of Pain no   Time Spent (min) 15-30 min   Latch Assistance no   Engorgement Measures complete emptying encouraged;supportive bra encouraged;manual expression pre feeding   Breastfeeding Education adequate infant intake;adequate milk volume;importance of skin-to-skin contact;increasing milk supply;label/storage of breast milk;milk expression, hand;milk expression, electric pump   Breastfeeding History   Breastfeeding History yes   Previous Breastfeeding Success successful   Infant " First Feeding   Skin-to-Skin Contact, Duration continued   Feeding Infant   Feeding Readiness Cues rooting   Feeding Tolerance/Success reluctant to latch;refusal to suck;disinterested   Effective Latch During Feeding no   Audible Swallow no   Skin-to-Skin Contact During Feeding yes   Equipment Type/Education   Pump Type Symphony   Breast Pump Type double electric, hospital grade   Breast Pump Flange Type hard   Breast Pump Flange Size 27 mm   Breast Pumping Bilateral Breasts:  (enc. to pump for 15mins. or 5 mins. after last drop)   Pumping Frequency (times) (enc. to pump 8 or more times in 24 hrs.)   Lactation Referrals   Lactation Consult Follow up;Knowledge deficit;Pump teaching;Breastfeeding assessment   Lactation Interventions   Attachment Promotion skin-to-skin contact encouraged;role responsibility promoted;privacy provided;breastfeeding assistance provided;counseling provided;environment adjusted;face-to-face positioning promoted;family involvement promoted   Breastfeeding Assistance support offered;feeding on demand promoted;feeding cue recognition promoted;feeding session observed   Maternal Breastfeeding Support diary/feeding log utilized;encouragement offered;infant-mother separation minimized;lactation counseling provided   Latch Promotion suck stimulated with colostrum drop

## 2017-07-24 NOTE — PLAN OF CARE
Problem: Breastfeeding (Adult,Obstetrics,Pediatric)  Goal: Signs and Symptoms of Listed Potential Problems Will be Absent, Minimized or Managed (Breastfeeding)  Signs and symptoms of listed potential problems will be absent, minimized or managed by discharge/transition of care (reference Breastfeeding (Adult,Obstetrics,Pediatric) CPG).   Outcome: Ongoing (interventions implemented as appropriate)  Mom will attempt to breastfeed baby 8 or more times in 24 hours and on cue if baby is not spitting up.   She will supplement with EBM/formula as instructed by NNP.  Mom will pump or hand express 8 or more times in 24hrs. if baby is not latching and breastfeeding well. She will do breast massage before, during and after pumping for additional stimulation.  She will do lots of skin to skin between feedings and before starting a feeding.  She will clean collection kit as instructed, and will label and store EBM safely.  She will monitor for signs of an adequate feeding/adequate milk supply.  She will follow up with pediatrician on discharge as instructed by NNP.  She will call [facility] for any needs or concerns.

## 2017-07-24 NOTE — NURSING
0650 - Rec'd report from offgoing nurse PRETTY Pepe RN. Pt has Repeat  17 @ 07. Allergy to PCN. H/O CTHN and Anxiety. Baby's in NICU. Pt is pumping and have milk stored in the Nursery but baby also latched on last night x2 occassions. Pt scheduled for D/C but to room in. Pt awake in bed.   0755 - Dr. Alves @ BS.   0800 - Pt. requested to stay an additional night per Dr. Alves. She advised Case Management be called. Left message. PCT reported elevated B/P. Explained pt h/o and advised to re-take.   1045 - Karina Reyes, Utilization Review office called back and advised only if it's medically necessary. PCT reported repeated B/P elevated. Advised to document and will monitor for symptoms and notify MD.   1053 - Called Dr. Alves RE: message from Case Management. She advised to notify pt and she will write D/C orders.   1115 - Called Dr. Alves back and advised of elevated B/P. Pt. Will remain admitted and meds started for elevated B/P. PCT and Charge nurse update.   1150 - Called to BS by mom because pt was feeling anxious about right ankle being a little more swollen then left. No pitting edema noted. Pt. Became teary eyed thinking about clots. Reassured her that her ankle looks fine at present. We'll continue to monitor it and notify MD as needed. Offered PRN anxiety med but pt refused. She's planning to go to the nursery @ noon to feed the baby. Encouraged her to try to get a nap in afterwards. She stated she exhausted and probably just need rest. Will re-assess at the time and offer medication to help with resting.  184 - Called Dr. Velez RE: B/P. Left message on cell.   185 - Report given to oncoming nurse.

## 2017-07-24 NOTE — PROGRESS NOTES
Contacted by nurse who states blood pressures this morning have been elevated (@ 8am 143/106, @ 10:33am 159/97 and repeat @10:34 162/100) secondary to elevated blood pressure in the severe range will keep patient overnight and start blood pressure medication. If blood pressures improve overnight with medication will discharge tomorrow.

## 2017-07-25 PROCEDURE — 25000003 PHARM REV CODE 250: Performed by: OBSTETRICS & GYNECOLOGY

## 2017-07-25 PROCEDURE — 11000001 HC ACUTE MED/SURG PRIVATE ROOM

## 2017-07-25 PROCEDURE — 63600175 PHARM REV CODE 636 W HCPCS: Performed by: OBSTETRICS & GYNECOLOGY

## 2017-07-25 PROCEDURE — 99232 SBSQ HOSP IP/OBS MODERATE 35: CPT | Mod: ,,, | Performed by: OBSTETRICS & GYNECOLOGY

## 2017-07-25 RX ORDER — NIFEDIPINE 30 MG/1
30 TABLET, EXTENDED RELEASE ORAL DAILY
Status: DISCONTINUED | OUTPATIENT
Start: 2017-07-25 | End: 2017-07-25

## 2017-07-25 RX ORDER — HYDRALAZINE HYDROCHLORIDE 10 MG/1
10 TABLET, FILM COATED ORAL ONCE
Status: COMPLETED | OUTPATIENT
Start: 2017-07-25 | End: 2017-07-25

## 2017-07-25 RX ORDER — NIFEDIPINE 30 MG/1
30 TABLET, EXTENDED RELEASE ORAL 2 TIMES DAILY
Status: DISCONTINUED | OUTPATIENT
Start: 2017-07-25 | End: 2017-07-25

## 2017-07-25 RX ORDER — NIFEDIPINE 30 MG/1
30 TABLET, EXTENDED RELEASE ORAL DAILY
Status: DISCONTINUED | OUTPATIENT
Start: 2017-07-25 | End: 2017-07-26 | Stop reason: HOSPADM

## 2017-07-25 RX ADMIN — NAPROXEN 500 MG: 500 TABLET ORAL at 02:07

## 2017-07-25 RX ADMIN — NIFEDIPINE 30 MG: 30 TABLET, FILM COATED, EXTENDED RELEASE ORAL at 06:07

## 2017-07-25 RX ADMIN — NAPROXEN 500 MG: 500 TABLET ORAL at 09:07

## 2017-07-25 RX ADMIN — DOCUSATE SODIUM 200 MG: 100 CAPSULE, LIQUID FILLED ORAL at 10:07

## 2017-07-25 RX ADMIN — FAMOTIDINE 20 MG: 20 TABLET, FILM COATED ORAL at 09:07

## 2017-07-25 RX ADMIN — FAMOTIDINE 20 MG: 20 TABLET, FILM COATED ORAL at 10:07

## 2017-07-25 RX ADMIN — HYDRALAZINE HYDROCHLORIDE 10 MG: 20 INJECTION INTRAMUSCULAR; INTRAVENOUS at 10:07

## 2017-07-25 RX ADMIN — NAPROXEN 500 MG: 500 TABLET ORAL at 06:07

## 2017-07-25 RX ADMIN — HYDRALAZINE HYDROCHLORIDE 10 MG: 10 TABLET ORAL at 06:07

## 2017-07-25 NOTE — LACTATION NOTE
"   07/25/17 0935   Infant Information   Infant's Name Hortencia   Infant's Medical Care Provider Miguel Ángel/Chanell Astudillo   Maternal Infant Assessment   Breast Size Issue none   Breast Shape Bilateral:;pendulous   Breast Density Bilateral:;full   Areola Bilateral:;elastic   Nipple(s) Bilateral:;everted   Nipple Symptoms bilateral:;tender   Infant Assessment   Mouth Size average   Sucking Reflex present   Rooting Reflex present   Swallow Reflex present   LATCH Score   Latch 2-->grasps breast, tongue down, lips flanged, rhythmic sucking   Audible Swallowing 2-->spontaneous and intermittent (24 hrs old)   Type Of Nipple 2-->everted (after stimulation)   Comfort (Breast/Nipple) 1-->filling, red/small blisters/bruises, mild/mod discomfort   Hold (Positioning) 1-->minimal assist, teach one side: mother does other, staff holds   Score (less than 7 for 2/more consecutive times, consult Lactation Consultant) 8   Pain/Comfort Assessments   Pain Assessment Performed Yes       Number Scale   Presence of Pain denies  (denies pain to nipples when BF/pumping)   Location - Side Bilateral   Location nipple(s)   Pain Rating: Rest 0   Pain Rating: Activity 0   Maternal Infant Feeding   Maternal Preparation breast care;hand hygiene   Maternal Emotional State relaxed   Infant Positioning clutch/"football"  (left FB hold,deep latch w/ lips flanged,sucking/swallowing)   Signs of Milk Transfer audible swallow;infant jaw motion present;breasts soften with feeding   Presence of Pain no   Time Spent (min) 15-30 min   Latch Assistance no  (baby on and BF)   Engorgement Measures complete emptying encouraged;supportive bra encouraged   Breastfeeding Education adequate infant intake;adequate milk volume;importance of skin-to-skin contact;increasing milk supply;label/storage of breast milk;milk expression, hand;milk expression, electric pump  (pumping infor, 30mm flanges given )   Breastfeeding History   Breastfeeding History yes   Previous " Breastfeeding Success successful   Feeding Infant   Feeding Readiness Cues sucking motion present;sustained alertness   Feeding Tolerance/Success alert for feeding;coordinated suck;coordinated swallow   Effective Latch During Feeding yes   Audible Swallow yes   Suck/Swallow Coordination present   Skin-to-Skin Contact During Feeding yes   Equipment Type/Education   Pump Type Symphony   Breast Pump Type double electric, hospital grade   Breast Pump Flange Type hard   Breast Pump Flange Size 30 mm  (mom requested a larger size flange, 30mm given w/ inst.)   Breast Pumping Bilateral Breasts:  (mom to pump 5 mins. after last drop pumped)   Pumping Frequency (times) (mo to pump 8 or more times in 24 hrs.)   Lactation Referrals   Lactation Consult Knowledge deficit;Follow up;Pump teaching;Breast/nipple pain   Lactation Interventions   Attachment Promotion skin-to-skin contact encouraged;role responsibility promoted;privacy provided;family involvement promoted;face-to-face positioning promoted;environment adjusted;breastfeeding assistance provided   Breastfeeding Assistance support offered;feeding cue recognition promoted;feeding on demand promoted;feeding session observed;infant latch-on verified;infant stimulated to wakeful state;infant suck/swallow verified   Maternal Breastfeeding Support diary/feeding log utilized;encouragement offered;lactation counseling provided   Latch Promotion infant moved to breast;suck stimulated with colostrum drop

## 2017-07-25 NOTE — PROGRESS NOTES
"Pt is 35 yo POD #4 s/p Repeat  for superimposed pre-eclampsia    Subjective: No complaints. Positive flatus and bowel movement. Ambulating, voiding, tolerating regular diet with no N/V. Denies headache, lightheadedness and dizziness.    Objective: BP (!) 136/95 (BP Location: Left arm, Patient Position: Lying, BP Method: Automatic)   Pulse 73   Temp 97.9 °F (36.6 °C) (Oral)   Resp 18   Ht 5' 7" (1.702 m)   Wt 75.3 kg (166 lb 0.1 oz)   LMP 2016 (Approximate)   SpO2 99%   Breastfeeding? Yes   BMI 26.00 kg/m²     Chest: Clear to auscultation  CV: Regular rate and rhythm  Abdomen: Non-tender, non-distended, soft, positive bowel sounds, uterus firm and below umbilicus  Incision: Clean, dry, intact  Extremities: Non-tender, no edema    Labs: H/H 9.8/29.9 ()            A POS      Assessment:Pt is 35 yo POD #4 s/p Repeat  for superimposed pre-eclampsia     Plan: BPs 130-150/90-100s overnight on Nifedipine 30 mg XL daily and Hydralazine PRN. Will increase Nifedipine 30 XL to BID and monitor bp overnight. Anticipate discharge tomorrow if bp stable.    Justine Valladares MD  2017  [facility] [place]  "

## 2017-07-25 NOTE — PLAN OF CARE
Problem: Breastfeeding (Adult,Obstetrics,Pediatric)  Goal: Signs and Symptoms of Listed Potential Problems Will be Absent, Minimized or Managed (Breastfeeding)  Signs and symptoms of listed potential problems will be absent, minimized or managed by discharge/transition of care (reference Breastfeeding (Adult,Obstetrics,Pediatric) CPG).   Outcome: Ongoing (interventions implemented as appropriate)  Mom will attempt to breastfeed baby 8 or more times in 24 hours and on cue.  She will supplement with EBM/Formula as instructed by NNP.  Mom will pump 8 or more times in 24hrs. if baby is not latching and breastfeeding well using the symphony pump at her bedside.  She will do breast massage before, during and after pumping for additional stimulation and will hand express after pumping.  She will do lots of skin to skin between feedings and before starting a feeding.  She will clean collection kit as instructed, and will label and store EBM safely.  She will monitor for signs of an adequate feeding/adequate milk supply.  She will call [facility] Center for any needs or concerns.

## 2017-07-25 NOTE — PLAN OF CARE
/106.  Pt worried about taking hydralazine after taking her Procardia this morning.  She is afraid it will make her blood pressure  too low.  Reassurance given.  To help calm her fears I told her I wouls verified with doctor.  9:00  Spoke with Dr. Valladares about pts concerns.  10:00  Dr Valladares at bedside to explain plan of care to pt.  Reassurance given that hydralazine PRN will not cause her BP too low.  Pt verbalized understanding.  Med given.  Will check on pt frequently for emotionally support.

## 2017-07-26 VITALS
TEMPERATURE: 98 F | HEIGHT: 67 IN | OXYGEN SATURATION: 99 % | DIASTOLIC BLOOD PRESSURE: 92 MMHG | BODY MASS INDEX: 26.06 KG/M2 | WEIGHT: 166 LBS | SYSTOLIC BLOOD PRESSURE: 136 MMHG | RESPIRATION RATE: 18 BRPM | HEART RATE: 87 BPM

## 2017-07-26 PROBLEM — Z98.891 HISTORY OF C-SECTION: Status: RESOLVED | Noted: 2017-02-06 | Resolved: 2017-07-26

## 2017-07-26 PROBLEM — O16.3 HYPERTENSION AFFECTING PREGNANCY IN THIRD TRIMESTER: Status: RESOLVED | Noted: 2017-07-19 | Resolved: 2017-07-26

## 2017-07-26 PROBLEM — O09.523 MULTIGRAVIDA OF ADVANCED MATERNAL AGE IN THIRD TRIMESTER: Status: RESOLVED | Noted: 2017-07-19 | Resolved: 2017-07-26

## 2017-07-26 PROBLEM — Z3A.35 35 WEEKS GESTATION OF PREGNANCY: Status: RESOLVED | Noted: 2017-07-21 | Resolved: 2017-07-26

## 2017-07-26 PROCEDURE — 25000003 PHARM REV CODE 250: Performed by: OBSTETRICS & GYNECOLOGY

## 2017-07-26 PROCEDURE — 99024 POSTOP FOLLOW-UP VISIT: CPT | Mod: ,,, | Performed by: OBSTETRICS & GYNECOLOGY

## 2017-07-26 RX ORDER — HYDRALAZINE HYDROCHLORIDE 25 MG/1
TABLET, FILM COATED ORAL
Qty: 30 TABLET | Refills: 0 | Status: SHIPPED | OUTPATIENT
Start: 2017-07-26 | End: 2017-08-16

## 2017-07-26 RX ORDER — NIFEDIPINE 30 MG/1
30 TABLET, EXTENDED RELEASE ORAL 2 TIMES DAILY
Qty: 60 TABLET | Refills: 1 | Status: SHIPPED | OUTPATIENT
Start: 2017-07-26 | End: 2017-09-01

## 2017-07-26 RX ADMIN — NIFEDIPINE 30 MG: 30 TABLET, FILM COATED, EXTENDED RELEASE ORAL at 09:07

## 2017-07-26 RX ADMIN — NAPROXEN 500 MG: 500 TABLET ORAL at 05:07

## 2017-07-26 RX ADMIN — FAMOTIDINE 20 MG: 20 TABLET, FILM COATED ORAL at 09:07

## 2017-07-26 NOTE — PLAN OF CARE
Problem: Patient Care Overview  Goal: Plan of Care Review  Outcome: Outcome(s) achieved Date Met: 17  Discharge instructions given verbally and in writing.  Verbalized understanding.  Received Mother-Baby care guide during hospital stay.  Prescription medication given to pt. by in house pharmacy with explanation for use.  Verbalized understanding. Has received her Tdap vaccine during this pregnancy.  Incision care reemphasized. Verbalized understanding. States she feels comfortable taking care of baby and has demonstrated ability to care for  and herself.  Breastfeeding well and also pumping breasts for supplementing baby with breast milk. BP is now WNL.  Dr Alves explained home medication regime to pt and instructed her to take her BP 3-4 x a day.  She has her own BP cuff and knows how to take her blood pressure. She will follow up with Dr Alves on 17.  Says she will have assistance when she returns home from her mother and her .  Baby is in NICU so pt is rooming in until baby is discharged. Discharged in stable condition.

## 2017-07-26 NOTE — LACTATION NOTE
07/26/17 0925   Infant Information   Infant's Name Hortencia   Infant's Medical Care Provider Miguel Ángel/Chanell [facility]   Maternal Infant Assessment   Breast Size Issue none   Breast Shape Bilateral:;pendulous   Breast Density Bilateral:;full   Areola Bilateral:;elastic   Nipple(s) Bilateral:;everted   Nipple Symptoms bilateral:;other (see comments);tender  (no redness,nipples tender)   Infant Assessment   Mouth Size large   Sucking Reflex present  (per pt.)   Rooting Reflex present  (per pt.)   Swallow Reflex present  (per pt.)   Breasts WDL   Breasts WDL WDL   Pain/Comfort Assessments   Pain Assessment Performed Yes       Number Scale   Presence of Pain denies  (denies pain to nipples after latching baby when BF)   Location - Side Bilateral   Location nipple(s)   Pain Rating: Rest 0   Pain Rating: Activity 0   Maternal Infant Feeding   Maternal Preparation breast care;hand hygiene   Maternal Emotional State relaxed   Infant Positioning (mom holding baby, baby sleeping)   Signs of Milk Transfer audible swallow;infant jaw motion present  (per pt.)   Presence of Pain no   Time Spent (min) 30-60 min   Comfort Measures Following Feeding air-drying encouraged;expressed milk applied;soap use discouraged;water cleansing encouraged;other (see comments)  (gel pads given, using lanolin when not wearing pads)   Latch Assistance no  (mom just finished BF baby, baby sleeping)   Engorgement Measures complete emptying encouraged;manual expression pre feeding;supportive bra encouraged   Breastfeeding Education adequate infant intake;adequate milk volume;diet;importance of skin-to-skin contact;increasing milk supply;label/storage of breast milk;medication effects;milk expression, electric pump;milk expression, hand;prenatal vitamins continued;returning to work;weaning;other (see comments)  (nipple care,use of gel pads,d/c teaching,pumping info.)   Breastfeeding History   Currently Breastfeeding no   Breastfeeding History yes    Previous Breastfeeding Success successful   Duration of Previous Breastfeeding 18 mo.   Previous Breastfeeding Problems mastitis  (had mastitis when BF first child)   Feeding Infant   Satiety Cues sleeping after feeding   Audible Swallow yes  (per pt.)   Suck/Swallow Coordination present  (per pt.)   Equipment Type/Education   Pump Type Symphony   Breast Pump Type double electric, hospital grade   Breast Pump Flange Type hard   Breast Pump Flange Size 30 mm   Breast Pumping (enc. to pump 5 mins. after last drop pumped)   Pumping Frequency (times) (enc. to pump 8+/24hrs.if baby not latching and/or stimulatio)   Lactation Referrals   Lactation Consult Follow up;Breast/nipple pain;Breastfeeding assessment;Knowledge deficit;Pump teaching;Other (Comment)  (d/c teaching)   Lactation Interventions   Attachment Promotion skin-to-skin contact encouraged;role responsibility promoted;privacy provided;family involvement promoted;counseling provided;environment adjusted;face-to-face positioning promoted   Breast Care: Breastfeeding manual expression to soften breast;milk massaged towards nipple;lanolin to nipple(s) applied;warm shower encouraged;other (see comments)  (discussed ways on softening br for easier latch/pumping)   Breastfeeding Assistance electric breast pump used;milk expression/pumping;support offered   Maternal Breastfeeding Support diary/feeding log utilized;encouragement offered;lactation counseling provided

## 2017-07-26 NOTE — PLAN OF CARE
Problem: Breastfeeding (Adult,Obstetrics,Pediatric)  Goal: Signs and Symptoms of Listed Potential Problems Will be Absent, Minimized or Managed (Breastfeeding)  Signs and symptoms of listed potential problems will be absent, minimized or managed by discharge/transition of care (reference Breastfeeding (Adult,Obstetrics,Pediatric) CPG).   Outcome: Outcome(s) achieved Date Met: 07/26/17  Mom will room in with baby and will attempt to breastfeed baby 8 or more times in 24 hours.  She will supplement with EBM as instructed by NNP.  Mom will pump 8 or more times in 24hrs. if baby is not latching and breastfeeding well. She will do breast massage before, during and after pumping for additional stimulation.  She will do skin to skin  before starting a feeding.  She will clean collection kit as instructed, and will label and store EBM safely.  She will monitor for signs of an adequate feeding/adequate milk supply.  She will follow up with pediatrician on discharge as instructed by NNP.  She will call [facility] for any needs or concerns.

## 2017-07-26 NOTE — PROGRESS NOTES
"POD # 5 s/p Repeat  for severe pre-eclampsia and elevated LFT's    Subjective: No complaints gets very worried over blood pressure. Positive flatus and BM. No symptoms of pre-eclampsia. Had to increase Procardia yesterday to BID dosage which seems to control BP quite well.    Objective: /83 (BP Location: Left arm, Patient Position: Lying, BP Method: Automatic)   Pulse 92   Temp 97.8 °F (36.6 °C) (Oral)   Resp 18   Ht 5' 7" (1.702 m)   Wt 75.3 kg (166 lb 0.1 oz)   LMP 2016 (Approximate)   SpO2 99%   Breastfeeding? Yes   BMI 26.00 kg/m²     H/H:   Lab Results   Component Value Date    WBC 15.84 (H) 2017    HGB 9.8 (L) 2017    HCT 29.9 (L) 2017    MCV 86 2017     2017         Chest: Clear to auscultation  CV: Regular rate and rhythm  Abdomen: Non-tender, non-distended, soft, positive bowel sounds  Incision: Clean, dry, intact with some ecchymosis but it is decreasing in size from Monday  Extremities: Non-tender, no edema. DTR's 2+    Assessment: S/P     Plan: Discharge home today. Follow up 1 weeks      "

## 2017-07-26 NOTE — DISCHARGE INSTRUCTIONS
"Patient Discharge Instructions for Postpartum Women    Resume Regular Diet  Increase activity gradually, no heavy lifting  Shower  No tampons, douching or sexual intercourse.  Discuss birth control options with your physician.  Wear a support bra  Return to work/school when you've been cleared by a physician    Call your physician if     *Fever of 100.4 or higher  *Persistent nausea/ vomiting  *Incisional drainage  *Heavy vaginal bleeding or large clots (Heavy bleeding is soaking 1 pad in an hour)  *Swelling and pain in arms or legs  *Severe headaches, blurred vision or fainting  *Shortness of breath  *Frequency and burning with urination  *Signs of postpartum depression, discuss these signs with your physician    Call lactation services for questions regarding feeding, nipple and breast care, and general questions about lactation.  They can be reached at [phone]         Understanding Postpartum Depression    You've just had a baby.  You know you should be excited and happy.  But instead you find yourself crying for no reason.  You may have trouble coping with your daily tasks.  You feel sad, tired, and hopeless most of the time.  You may even feel ashamed or guilty.  But what you're going through is not your fault and you can feel better.  Talk to your doctor.  He or she can help.    Depression After Childbirth    You may be weepy and tired right after giving birth.  These feelings are normal.  They're sometimes called the "baby blues."  These blues go away 2-3 weeks.  However, postpartum (meaning "after birth") depression lasts much longer and is more sever than the "baby blues."  It can make you feel sad and hopeless.  You may also fear that your baby will be harmed and worry about being a bad mother.      What is Depression?    Depression is a mood disorder that affects the way you think and feel.  The most common symptom is a feeling of deep sadness.  You may also feel as if you just can't cope with life.  "   Other symptoms include:      * Gaining or loosing weight  * Sleeping too much or too little  * Feeling tired all the time  * Feeling restless  * Fears of harming your baby   * Lack of interest in your baby  * Feeling worthless or guilty  * No longer finding pleasure in things you used to  * Having trouble thinking clearly or making decisions  * Thoughts of hurting yourself or your baby    What Causes Postpartum Depression    The exact causes of postpartum depression isn't known.  It may be due to changes in your hormones during and after childbirth.  You may also be tired from caring for your baby and adjusting to being a mother.  All these factors may make you feel depressed.  In some cases, your genes may also play a role.    Depression Can Be Treated    The good news is that there are many ways to treat postpartum depression.  Talking to your doctor is the first step toward feeling better.    Resources:    * National [place] of Mental Health  -- 235.606.9528    www.nimh.nih.gov    * National [place]va on Mental Illness --743.142.3798    Www.agusto.org    * Mental Health [place] -- 786.927.4557     Www.[facility].org    * National Suicide Hotline --127.121.4121 (800-SUICIDE)    8049-9368 [employer]  All rights reserved.  This information is not intended as a substitute for professional medical care.  Always follow up with your healthcare professional's instructions.    Breastfeeding Discharge Instructions       Feed the baby at the earliest sign of hunger or comfort  o Hands to mouth, sucking motions  o Rooting or searching for something to suck on  o Dont wait for crying - it is a sign of distress     The feedings may be 8-12 times per 24hrs and will not follow a schedule   Avoid pacifiers and bottles for the first 4 weeks   Alternate the breast you start the feeding with, or start with the breast that feels the fullest   Switch breasts when the baby takes himself off the breast or falls  asleep   Keep offering breasts until the baby looks full, no longer gives hunger signs, and stays asleep when placed on his back in the crib   If the baby is sleepy and wont wake for a feeding, put the baby skin-to-skin dressed in a diaper against the mothers bare chest   Sleep near your baby   The baby should be positioned and latched on to the breast correctly  o Chest-to-chest, chin in the breast  o Babys lips are flipped outward  o Babys mouth is stretched open wide like a shout  o Babys sucking should feel like tugging to the mother  - The baby should be drinking at the breast:  o You should hear swallowing or gulping throughout the feeding  o You should see milk on the babys lips when he comes off the breast  o Your breasts should be softer when the baby is finished feeding  o The baby should look relaxed at the end of feedings  o After the 4th day and your milk is in:  o The babys poop should turn bright yellow and be loose, watery, and seedy  o The baby should have at least 3-4 poops the size of the palm of your hand per day  o The baby should have at least 5-6 wet diapers per day  o The urine should be light yellow in color  You should drink when you are thirsty and eat a healthy diet when you are    hungry.     Take naps to get the rest you need.   Take medications and/or drink alcohol only with permission of your obstetrician    or the babys pediatrician.  You can also call the [facility],   ([phone]), Monday-Friday, 8am-5pm Central time, to get the most   up-to-date evidence-based information on the use of medications during   pregnancy and breastfeeding.      The baby should be examined by a pediatrician at 3-5 days of age.  Once your   milk comes in, the baby should be gaining at least ½ - 1oz each day and should be back to birthweight no later than 10-14 days of age.          Community Resources    [facility] Breastfeeding Warmline: 152.254.1674  [facility]  clinics: provide incentives and breastpumps to eligible mothers  [employer] (LLLI):  mother-to-mother support group website        www.lll.org  [facility] La Leche League mother-to-mother support groups:        www.[employer]        La Leche League [facility]   Dr. Zacarias Noland website for latch videos and general information:        www.breastfeedinginc.ca  [facility] is a call center that provides information about the safety of taking medications while breastfeeding.  Call 1-690.628.4549, M-F, 8am-5pm, [place].  International Lactation Consultant Association provides resources for assistance:        www.ilca.org  [facility] Breastfeeding Coalition provides informationand resources for parents  and the community    http://[facility].org     [place] is a mom-to-mom support group:                             www.nolanesting.[employer]//breastfeedng-support/  Partners for Healthy Babies:  1-192-989-BABY(8056)  [employer]: a breastfeeding support group for women of color, 895.902.2035          Pumping    Preparation and Hygiene:    1. Shower daily.  2. Wear a clean bra each day and wash daily in warm soapy water.  3. Change wet or moist breast pads frequently.  Moist pads can promote growth of germs.  4. Actively wash your hands, paying close attention to the area around and under your fingernails, thoroughly with soap and water for 15 seconds before pumping or handling your milk.  Re-wash your hands if you touch anything (scratching your nose, answering the phone, etc) while pumping or handling your milk.   5. Before pumping your breasts, assemble the pump collection kit and have ready the sterile container and labels.  Place these items on a clean surface next to the breastpump.  6. Each time after you have finished pumping, take apart all of the parts of the breastpump collection kit and place them in a separate cleaning container (do not place them in the sink).  Be sure to  remove the yellow valve from the breastshield and separate the white membrane from the yellow valve.  Rinse all of these parts with cool water.  Then use a new sponge and/or bottle brush and dishwashing detergent to clean the parts.  Rinse off the soapy water with cool water and air dry on a clean towel covered with a clean cloth.  All parts may also be washed after each use in the top rack of a .  7. Once each day, sterilize all of the parts of the breastpump collection kit.  This can be done by boiling the kit parts for 10 minutes or by using a Quick Clean Micro-Steam Bag made by [employer]  8. If condensation appears in the tubing, continue to run the pump with the tubing attached for 1-2 minutes or until the tubing is dry.   9. Notify your babys nurse or doctor if you become ill or need to take any medication, even over-the-counter medicines.        Collection and Storage of Expressed Breastmilk:         1. Pump your breasts at least 8-10 times every 24 hours.  Double pump (both breasts at  the same time) for at least 15-20 minutes using the most suction that is comfortable.    2. Write the date and time of pumping and the name of any medications you are takingon the babys pre-printed hospital identification label.   3. Place your babys pre-printed hospital identification label on each container of breastmilk.  Additional pre-printed labels can be obtained from your babys nurse.  If your expressed breastmilk is not correctly labeled, the nurse cannot feed the milk to the baby.       4. Place a brightly colored sticker on the top of each container of milk pumped during the first 30 days.  This identifies the milk as special and having higher levels of nutrients and anti-infective properties that are so important for your baby.  Additional stickers can be obtained from the lactation consultants or your babys nurse.  5.   Do not touch the inside of the storage containers or lids.  6.      Pour  the amount of expressed milk needed for 1 of your babys feedings into each   storage container. Use a new container(s) for each pumping.  Additional storage   containers can be obtained from your babys nurse.        7.       Tightly screw the lid onto the container and place immediately into the       refrigerator fordaily transportation to the hospital.   Do not freeze your milk      unless asked to do so by your babys nurse.  However, if you are not able to      visit your baby each day, place the expressed breastmilk in the freezer.  8.   Expressed breastmilk should be refrigerated or frozen within 1 hour of      pumping.  9.      Do not store expressed breastmilk on the door of your refrigerator or freezer where the temperature is warmer.         Transportation of Expressed Breastmilk:    1. Refrigerated breastmilk or frozen milk should be packed tightly together in a cooler with frozen, blue gel-packs to keep the milk frozen.  DO NOT USE ICE CUBES (WET ICE) TO TRANSPORT FROZEN MILK.   A clean towel can be used to fill any extra space between containers of frozen milk.  2.    Bring your expressed milk from home each time you visit the baby.

## 2017-07-26 NOTE — DISCHARGE SUMMARY
Admit Date: 17  Discharge Date: 17    Attending physician: MD Long    Diagnosis:   Pregnancy at 35 weeks delivered  Viable female  Infant  CHTN  Severe superimposed pre-eclampsia  Elevated liver function tests    Procedure:   Repeat     Hospital Course: Afebrile. Blood pressure was variable through out stay but by discharge was controlled by Procardia 30XL two times daily. She will be discharged home with this regimen and also will be given Hydralazine 25mg to take prn if SBP > 160 or DBP >100 but not to exceed Hydralazine 150mg per day. Routine progressive care. Vaginal bleeding stable. Tolerating oral intake. Positive flatus.    Discharged Condition: Improving    Disposition: Discharged to home or self care    Activity:  As tolerated  Pelvic rest x 6 weeks  Diet: Regular  Medications: Given to patient or sent electronically   Follow up:  1  Weeks for  and BP check  4-6 weeks for vaginal delivery

## 2017-07-26 NOTE — PLAN OF CARE
Problem: Patient Care Overview  Goal: Plan of Care Review  Outcome: Ongoing (interventions implemented as appropriate)  Has worried about her blood pressure and about the baby throughout the day.  States that she becomes emotional and likes to be reassured.  Has been pumping breast and feeding baby breast milk along with breastfeeding. Has been to the NICU to see baby and this evening the baby was brought out to mothers room for nursing.  She has not complained of pain.  Amb in room and to NICU.  States she wants to get her blood pressure under control so that she can go home.

## 2017-07-27 ENCOUNTER — PATIENT MESSAGE (OUTPATIENT)
Dept: OBSTETRICS AND GYNECOLOGY | Facility: CLINIC | Age: 37
End: 2017-07-27

## 2017-07-27 NOTE — TELEPHONE ENCOUNTER
Had she taken any Xanax or Percocet. Procardia doesn't usually cause drowsiness. If she wants she can drop down to once daily on the Procardia and if blood pressure starts to creep up restart the second dose.

## 2017-07-29 ENCOUNTER — HOSPITAL ENCOUNTER (EMERGENCY)
Facility: HOSPITAL | Age: 37
Discharge: HOME | End: 2017-07-29
Attending: EMERGENCY MEDICINE
Payer: COMMERCIAL

## 2017-07-29 VITALS
SYSTOLIC BLOOD PRESSURE: 148 MMHG | HEIGHT: 67 IN | WEIGHT: 150 LBS | BODY MASS INDEX: 23.54 KG/M2 | HEART RATE: 84 BPM | DIASTOLIC BLOOD PRESSURE: 99 MMHG | OXYGEN SATURATION: 99 % | TEMPERATURE: 98 F | RESPIRATION RATE: 18 BRPM

## 2017-07-29 DIAGNOSIS — I10 ELEVATED BLOOD PRESSURE READING WITH DIAGNOSIS OF HYPERTENSION: ICD-10-CM

## 2017-07-29 DIAGNOSIS — N99.820 POSTOPERATIVE VAGINAL BLEEDING FOLLOWING GENITOURINARY PROCEDURE: Primary | ICD-10-CM

## 2017-07-29 LAB
BASOPHILS # BLD AUTO: 0.03 K/UL
BASOPHILS NFR BLD: 0.3 %
DIFFERENTIAL METHOD: ABNORMAL
EOSINOPHIL # BLD AUTO: 0.2 K/UL
EOSINOPHIL NFR BLD: 2 %
ERYTHROCYTE [DISTWIDTH] IN BLOOD BY AUTOMATED COUNT: 13.2 %
HCT VFR BLD AUTO: 39 %
HGB BLD-MCNC: 12.4 G/DL
LYMPHOCYTES # BLD AUTO: 2.3 K/UL
LYMPHOCYTES NFR BLD: 23.3 %
MCH RBC QN AUTO: 27.3 PG
MCHC RBC AUTO-ENTMCNC: 31.8 G/DL
MCV RBC AUTO: 86 FL
MONOCYTES # BLD AUTO: 0.7 K/UL
MONOCYTES NFR BLD: 7.4 %
NEUTROPHILS # BLD AUTO: 6.4 K/UL
NEUTROPHILS NFR BLD: 65.6 %
PLATELET # BLD AUTO: 380 K/UL
PMV BLD AUTO: 8.6 FL
RBC # BLD AUTO: 4.55 M/UL
WBC # BLD AUTO: 9.79 K/UL

## 2017-07-29 PROCEDURE — 99283 EMERGENCY DEPT VISIT LOW MDM: CPT

## 2017-07-29 PROCEDURE — 85025 COMPLETE CBC W/AUTO DIFF WBC: CPT

## 2017-07-29 RX ORDER — DOCUSATE SODIUM 100 MG/1
100 CAPSULE, LIQUID FILLED ORAL 2 TIMES DAILY
COMMUNITY
End: 2017-09-01

## 2017-07-29 NOTE — ED NOTES
Pt sitting up in bed, AAO x's 3, mother and baby at bedside. Pt stated that she came to the ER with c/o vaginal bleeding. Pt is 1 week post-partum and reports a sudden gush of blood around 1500 today. Pt stated that she called L & D and was told to come to the ER for further eval due to hx of hemorrhage following miscarriage. Pt reports a maddison colored appearance in the vaginal blood.   APPEARANCE: Alert, oriented and in no acute distress, appear anxious.  CARDIAC: Normal rate and rhythm, no murmur heard.   PERIPHERAL VASCULAR: peripheral pulses present. Normal cap refill. No edema. Warm to touch.    RESPIRATORY:Normal rate and effort, breath sounds clear bilaterally throughout chest. Respirations are equal and unlabored no obvious signs of distress.  GASTRO: soft, bowel sounds normal, no tenderness, no abdominal distention.  MUSC: Full ROM. No bony tenderness or soft tissue tenderness. No obvious deformity.  SKIN: Skin is warm and dry, normal skin turgor, mucous membranes moist.  NEURO: 5/5 strength major flexors/extensors bilaterally. Sensory intact to light touch bilaterally. [place] coma scale: eyes open spontaneously-4, oriented & converses-5, obeys commands-6. No neurological abnormalities.   MENTAL STATUS: awake, alert and aware of environment.  EYE: PERRL, both eyes: pupils brisk and reactive to light. Normal size.  ENT: EARS: no obvious drainage. NOSE: no active bleeding.

## 2017-07-29 NOTE — ED PROVIDER NOTES
Encounter Date: 2017       History     Chief Complaint   Patient presents with    Vaginal Bleeding     c/o heavy vaginal bleeding that began after taking a nap today. Pt had  with Dr. Alves 1 week ago. Pt has history of hemorrhage after surgery.      Crystal Hurley, a 36 y.o. female, complains of vaginal bleeding.  She states she had a  1 week ago and has had some episodes of vaginal bleeding of reddish-brown material.  She had 2 episodes today today of passing blood in the toilet.  His history of anemia and heavy vaginal bleeding with a previous miscarriage.  Pain location: No significant pain              Review of patient's allergies indicates:   Allergen Reactions    Pcn [penicillins] Rash     Past Medical History:   Diagnosis Date    Anemia     Anxiety     Arthritis 10/2014    right hip    Bartholin's cyst     Elevated BP     Hypertension     Miscarriage      Past Surgical History:   Procedure Laterality Date     SECTION      DILATION AND CURETTAGE OF UTERUS  2014     Family History   Problem Relation Age of Onset    Breast cancer Neg Hx     Colon cancer Neg Hx     Ovarian cancer Neg Hx      Social History   Substance Use Topics    Smoking status: Never Smoker    Smokeless tobacco: Never Used    Alcohol use No     Review of Systems   Constitutional: Negative.    Genitourinary: Positive for vaginal bleeding.   All other systems reviewed and are negative.      Physical Exam     Initial Vitals [17 1632]   BP Pulse Resp Temp SpO2   (!) 157/109 83 20 98 °F (36.7 °C) 97 %      MAP       125         Physical Exam    Nursing note and vitals reviewed.  Constitutional: She appears well-developed and well-nourished. No distress.   Abdominal:   Surgical incision appears in good condition without erythema bleeding or purulent discharge.  There is mild tenderness on examination.   Genitourinary:   Genitourinary Comments: Pelvic exam    External: Normal  Vagina:  10 cc lochia  Cervix:    Uterus:  Firm and mildly tender  Adnexa:  No adnexal tenderness   Neurological: She is alert and oriented to person, place, and time. She has normal strength.   Psychiatric: She has a normal mood and affect. Her behavior is normal. Thought content normal.         ED Course   Procedures  Labs Reviewed - No data to display          Medical Decision Making:   Initial Assessment:    36 y.o. female, complains of vaginal bleeding.  She states she had a  1 week ago and has had some episodes of vaginal bleeding of reddish-brown material.  She had 2 episodes today today of passing blood in the toilet.  His history of anemia and heavy vaginal bleeding with a previous miscarriage.  Pain location: No significant pain  PE: No acute distress;  incision appears in good condition; vaginal exam 10 cc lochia /dark brown; firm uterus mild tenderness  Clinical Tests:   Lab Tests: Ordered and Reviewed  The following lab test(s) were unremarkable: CBC  ED Management:  No excessive bleeding was noted.  A normal amount of lochia was identified.  The patient was reassured.  There was elevated blood pressure noted and the patient will take her medication upon returning home.  She'll follow-up with her gynecologist this next week.                   ED Course     Clinical Impression:   The primary encounter diagnosis was Postoperative vaginal bleeding following genitourinary procedure. A diagnosis of Elevated blood pressure reading with diagnosis of hypertension was also pertinent to this visit.                           Neri Pham Jr., MD  17 [id number]

## 2017-08-02 PROCEDURE — 99284 EMERGENCY DEPT VISIT MOD MDM: CPT

## 2017-08-03 ENCOUNTER — HOSPITAL ENCOUNTER (EMERGENCY)
Facility: HOSPITAL | Age: 37
Discharge: HOME | End: 2017-08-03
Attending: EMERGENCY MEDICINE
Payer: COMMERCIAL

## 2017-08-03 ENCOUNTER — PATIENT MESSAGE (OUTPATIENT)
Dept: OBSTETRICS AND GYNECOLOGY | Facility: CLINIC | Age: 37
End: 2017-08-03

## 2017-08-03 ENCOUNTER — TELEPHONE (OUTPATIENT)
Dept: OBSTETRICS AND GYNECOLOGY | Facility: CLINIC | Age: 37
End: 2017-08-03

## 2017-08-03 VITALS
OXYGEN SATURATION: 100 % | WEIGHT: 150 LBS | RESPIRATION RATE: 20 BRPM | DIASTOLIC BLOOD PRESSURE: 68 MMHG | HEIGHT: 67 IN | HEART RATE: 88 BPM | SYSTOLIC BLOOD PRESSURE: 137 MMHG | TEMPERATURE: 98 F | BODY MASS INDEX: 23.54 KG/M2

## 2017-08-03 LAB
BASOPHILS # BLD AUTO: 0.04 K/UL
BASOPHILS NFR BLD: 0.4 %
DIFFERENTIAL METHOD: ABNORMAL
EOSINOPHIL # BLD AUTO: 0.2 K/UL
EOSINOPHIL NFR BLD: 2.4 %
ERYTHROCYTE [DISTWIDTH] IN BLOOD BY AUTOMATED COUNT: 13.5 %
HCT VFR BLD AUTO: 38.5 %
HGB BLD-MCNC: 12.1 G/DL
LYMPHOCYTES # BLD AUTO: 2.4 K/UL
LYMPHOCYTES NFR BLD: 24 %
MCH RBC QN AUTO: 27.3 PG
MCHC RBC AUTO-ENTMCNC: 31.4 G/DL
MCV RBC AUTO: 87 FL
MONOCYTES # BLD AUTO: 0.5 K/UL
MONOCYTES NFR BLD: 5.5 %
NEUTROPHILS # BLD AUTO: 6.6 K/UL
NEUTROPHILS NFR BLD: 66.8 %
PLATELET # BLD AUTO: 344 K/UL
PMV BLD AUTO: 8.7 FL
RBC # BLD AUTO: 4.44 M/UL
WBC # BLD AUTO: 9.86 K/UL

## 2017-08-03 PROCEDURE — 85025 COMPLETE CBC W/AUTO DIFF WBC: CPT

## 2017-08-03 RX ORDER — MISOPROSTOL 200 UG/1
200 TABLET ORAL EVERY 6 HOURS
Qty: 20 TABLET | Refills: 0 | Status: SHIPPED | OUTPATIENT
Start: 2017-08-03 | End: 2017-08-16

## 2017-08-03 NOTE — TELEPHONE ENCOUNTER
Notify that yes red blood is normal and can be off and on for 8 weeks. Yes the guideline is changing an overnight pad every hour. Her blood count was checked twice in ED and did not change. Her blood count is up from after delivery. I will send medication to the pharmacy to help decrease any bleeding.

## 2017-08-03 NOTE — TELEPHONE ENCOUNTER
Hey nedmin I just wanted to let y'all know what has been going on and see what Dr Alves thinks about it. So my bleeding wasn't that bad in hospital or even when I first got home. It has since gotten heavier.  I went from having  light bleeding with a few gushes later in the day that tapered to nothing, to heavy bleeding with now cramping and many larger gushes. The gushes seem to be worse when using the  bathroom. I went to the ER last night since it had gotten heavier with cramps.  The doctor who treated me was happy with my bloodwork and I was sent home.  They asked if I had filled a pad an hour, I know this is the guideline, but ever time I felt a gush I was either already on the toilet and I went there bc of the gushing. There was a good bit in the toilet and I had to keep wiping and the blood kept coming.  Is it normal for me to still be bleeding red blood at almost 2 weeks and for it to increase  and cramps to start again? I started taking the naproxen again when I got home from  ER last night. Doc said that my uterus felt good.  When I called to ask the nurse at [facility] they had advised me to get it checked especially since Yakelin hemorrhaged before.   Thanks   Crystal Rivera

## 2017-08-03 NOTE — ED PROVIDER NOTES
Encounter Date: 2017       History     Chief Complaint   Patient presents with    Vaginal Bleeding     heavy vag bleeding since 1600 with abd cramping. c-sections approximately 2 weeks ago. pt is under the care of .     This is a 37 y/o F s/p  delivery of a healthy baby on  who presents with vaginal bleeding since about 4PM.  Patient has been seen once in the past week in the ED for concerns about vaginal bleeding and reports that she had a gush of blood with clots that scared her and she had to change maxi pads 3 times since then.  Patient denies SOB, lightheadedness, or exertional dyspnea.  NO fever or chills. A CBC was checked 4 days ago and H/H had improved since delivery.  Patient sees Dr. Alves and has had followup conversations with her OB office regarding bleeding. The baby is doing well and the patient is breast feeding.       The history is provided by the patient.   Vaginal Bleeding   Pertinent negatives include no chest pain and no shortness of breath.     Review of patient's allergies indicates:   Allergen Reactions    Pcn [penicillins] Rash     Past Medical History:   Diagnosis Date    Anemia     Anxiety     Arthritis 10/2014    right hip    Bartholin's cyst     Elevated BP     Hypertension     Miscarriage      Past Surgical History:   Procedure Laterality Date     SECTION      DILATION AND CURETTAGE OF UTERUS  2014     Family History   Problem Relation Age of Onset    Breast cancer Neg Hx     Colon cancer Neg Hx     Ovarian cancer Neg Hx      Social History   Substance Use Topics    Smoking status: Never Smoker    Smokeless tobacco: Never Used    Alcohol use No     Review of Systems   Constitutional: Negative for fever.   HENT: Negative for sore throat.    Respiratory: Negative for shortness of breath.    Cardiovascular: Negative for chest pain.   Gastrointestinal: Negative for nausea.   Genitourinary: Positive for vaginal bleeding. Negative for  dysuria.   Musculoskeletal: Negative for back pain.   Skin: Negative for rash.   Neurological: Negative for weakness.   Hematological: Does not bruise/bleed easily.   All other systems reviewed and are negative.      Physical Exam     Initial Vitals [17 2358]   BP Pulse Resp Temp SpO2   (!) 132/94 87 18 98 °F (36.7 °C) 100 %      MAP       106.67         Physical Exam    Nursing note and vitals reviewed.  Constitutional: She appears well-developed and well-nourished.   HENT:   Head: Normocephalic and atraumatic.   Eyes: Conjunctivae and EOM are normal. Pupils are equal, round, and reactive to light. Right eye exhibits no discharge. Left eye exhibits no discharge. No scleral icterus.   Neck: Normal range of motion. Neck supple.   Cardiovascular: Normal rate, regular rhythm and normal heart sounds. Exam reveals no gallop and no friction rub.    No murmur heard.  Pulmonary/Chest: Breath sounds normal. No stridor. No respiratory distress. She has no wheezes. She has no rhonchi. She has no rales.   Abdominal: Soft. Bowel sounds are normal. She exhibits no distension and no mass. There is no tenderness. There is no rebound and no guarding.   Genitourinary:   Genitourinary Comments: Minimal blood present in vaginal vault, cervix is soft and closed    Neurological: She is alert and oriented to person, place, and time. She has normal strength. No cranial nerve deficit or sensory deficit.   Skin: Skin is warm and dry.   Psychiatric: She has a normal mood and affect. Her behavior is normal. Judgment and thought content normal.         ED Course   Procedures  Labs Reviewed   CBC W/ AUTO DIFFERENTIAL - Abnormal; Notable for the following:        Result Value    MCHC 31.4 (*)     MPV 8.7 (*)     All other components within normal limits             Medical Decision Making:   Initial Assessment:   Anxious appearing patient with vaginal bleeding post .  CBC stable from prior, recommended f/u as planned with OB.GYN,  given precautions for bleeding more than 1 pad per hour or symptomatic anemia.  Reassurance provided.   Clinical Tests:   Lab Tests: Ordered and Reviewed                   ED Course     Clinical Impression:   The encounter diagnosis was Postpartum care and examination.    Disposition:   Disposition: Discharged  Condition: Stable                        Tania Coello MD  09/15/17 1452       Tania Coello MD  09/15/17 1457

## 2017-08-03 NOTE — TELEPHONE ENCOUNTER
----- Message from Eileen Stone sent at 8/3/2017 10:19 AM CDT -----  Contact: ms gallegos with [facility] [phone]  Please call delivery date

## 2017-08-03 NOTE — TELEPHONE ENCOUNTER
May initially experience a little increase in bleeding but she will still bleed off and on for up to 8 weeks.  Can get diarrhea and fever with Cytotec and maybe some increased cramping but usually theses things occur only with higher doses that we sometimes use.

## 2017-08-03 NOTE — ED NOTES
Dr. Coello at bedside to do Vaginal exam, tolerated well.  No distress noted.  C/o pain 1/10 and cramping in nature.

## 2017-08-04 ENCOUNTER — POSTPARTUM VISIT (OUTPATIENT)
Dept: OBSTETRICS AND GYNECOLOGY | Facility: CLINIC | Age: 37
End: 2017-08-04
Payer: COMMERCIAL

## 2017-08-04 VITALS
HEIGHT: 67 IN | WEIGHT: 154.56 LBS | BODY MASS INDEX: 24.26 KG/M2 | SYSTOLIC BLOOD PRESSURE: 132 MMHG | DIASTOLIC BLOOD PRESSURE: 86 MMHG

## 2017-08-04 PROCEDURE — 99024 POSTOP FOLLOW-UP VISIT: CPT | Mod: S$GLB,,, | Performed by: OBSTETRICS & GYNECOLOGY

## 2017-08-04 PROCEDURE — 99999 PR PBB SHADOW E&M-EST. PATIENT-LVL II: CPT | Mod: PBBFAC,,, | Performed by: OBSTETRICS & GYNECOLOGY

## 2017-08-07 ENCOUNTER — TELEPHONE (OUTPATIENT)
Dept: OBSTETRICS AND GYNECOLOGY | Facility: CLINIC | Age: 37
End: 2017-08-07

## 2017-08-07 NOTE — TELEPHONE ENCOUNTER
----- Message from Eileen Stone sent at 8/4/2017 11:34 AM CDT -----  Contact: ms gallegos with [facility]  [phone]  Please call with patient's delivery date, please leave a message

## 2017-08-09 ENCOUNTER — PATIENT MESSAGE (OUTPATIENT)
Dept: OBSTETRICS AND GYNECOLOGY | Facility: CLINIC | Age: 37
End: 2017-08-09

## 2017-08-13 ENCOUNTER — PATIENT MESSAGE (OUTPATIENT)
Dept: OBSTETRICS AND GYNECOLOGY | Facility: CLINIC | Age: 37
End: 2017-08-13

## 2017-08-13 ENCOUNTER — HOSPITAL ENCOUNTER (EMERGENCY)
Facility: HOSPITAL | Age: 37
Discharge: HOME | End: 2017-08-13
Attending: EMERGENCY MEDICINE
Payer: COMMERCIAL

## 2017-08-13 VITALS
WEIGHT: 150 LBS | DIASTOLIC BLOOD PRESSURE: 95 MMHG | OXYGEN SATURATION: 100 % | HEART RATE: 95 BPM | BODY MASS INDEX: 23.54 KG/M2 | SYSTOLIC BLOOD PRESSURE: 144 MMHG | HEIGHT: 67 IN | TEMPERATURE: 99 F | RESPIRATION RATE: 18 BRPM

## 2017-08-13 DIAGNOSIS — N75.0 BARTHOLIN CYST: Primary | ICD-10-CM

## 2017-08-13 PROCEDURE — 25000003 PHARM REV CODE 250: Performed by: EMERGENCY MEDICINE

## 2017-08-13 PROCEDURE — 87070 CULTURE OTHR SPECIMN AEROBIC: CPT

## 2017-08-13 PROCEDURE — 56420 I&D BARTHOLINS GLAND ABSCESS: CPT

## 2017-08-13 PROCEDURE — 99283 EMERGENCY DEPT VISIT LOW MDM: CPT | Mod: 25

## 2017-08-13 PROCEDURE — 25000003 PHARM REV CODE 250: Performed by: PHYSICIAN ASSISTANT

## 2017-08-13 PROCEDURE — 87077 CULTURE AEROBIC IDENTIFY: CPT

## 2017-08-13 PROCEDURE — 87186 SC STD MICRODIL/AGAR DIL: CPT

## 2017-08-13 RX ORDER — HYDROCODONE BITARTRATE AND ACETAMINOPHEN 5; 325 MG/1; MG/1
1 TABLET ORAL
Status: DISCONTINUED | OUTPATIENT
Start: 2017-08-13 | End: 2017-08-13 | Stop reason: HOSPADM

## 2017-08-13 RX ORDER — LIDOCAINE HYDROCHLORIDE 10 MG/ML
10 INJECTION INFILTRATION; PERINEURAL
Status: COMPLETED | OUTPATIENT
Start: 2017-08-13 | End: 2017-08-13

## 2017-08-13 RX ORDER — SILVER NITRATE 38.21; 12.74 MG/1; MG/1
1 STICK TOPICAL
Status: COMPLETED | OUTPATIENT
Start: 2017-08-13 | End: 2017-08-13

## 2017-08-13 RX ORDER — CLINDAMYCIN HYDROCHLORIDE 150 MG/1
300 CAPSULE ORAL 4 TIMES DAILY
Qty: 56 CAPSULE | Refills: 0 | Status: SHIPPED | OUTPATIENT
Start: 2017-08-13 | End: 2017-08-20

## 2017-08-13 RX ADMIN — SILVER NITRATE 1 APPLICATOR: 38.21; 12.74 STICK TOPICAL at 04:08

## 2017-08-13 RX ADMIN — LIDOCAINE HYDROCHLORIDE 10 ML: 10 INJECTION, SOLUTION INFILTRATION; PERINEURAL at 03:08

## 2017-08-13 NOTE — ED PROVIDER NOTES
Encounter Date: 2017       History     Chief Complaint   Patient presents with    Bartholin's Cyst     began during pregnancy and had to have drained while pregnant - returned on Friday 3 weeks post partum     Crystal Hurley, a 36 y.o. female that presents to the ED for bartholin cyst.  Patient states she suffers with Bartholin's cyst usually during her pregnancy.  She just delivered 3 weeks ago and states that this cystic area started about 2 days ago.  She complained of increased pressure and swelling to her vaginal area with no drainage.  She denies any fevers.  She has been seen by her OB/GYN for this issue in the past and has an appointment set for an ablation where she is going to consider also having a marsupialization done of this region.  He was trying to include warm compresses with no improvement of the pain and swelling.  She is currently breastfeeding.          The history is provided by the patient.     Review of patient's allergies indicates:   Allergen Reactions    Pcn [penicillins] Rash     Past Medical History:   Diagnosis Date    Anemia     Anxiety     Arthritis 10/2014    right hip    Bartholin's cyst     Elevated BP     Hypertension     Miscarriage      Past Surgical History:   Procedure Laterality Date     SECTION      DILATION AND CURETTAGE OF UTERUS  2014    HAND SURGERY      right hand     Family History   Problem Relation Age of Onset    Breast cancer Neg Hx     Colon cancer Neg Hx     Ovarian cancer Neg Hx      Social History   Substance Use Topics    Smoking status: Never Smoker    Smokeless tobacco: Never Used    Alcohol use No     Review of Systems   Constitutional: Negative for fever.   Respiratory: Negative for shortness of breath.    Cardiovascular: Negative for chest pain.   Gastrointestinal: Negative for nausea and vomiting.   Genitourinary: Positive for vaginal pain (swelling to labia ). Negative for dysuria.   Skin: Negative for color change  and rash.   Allergic/Immunologic: Negative for immunocompromised state.   Neurological: Negative for dizziness, syncope, weakness and light-headedness.   Hematological: Negative for adenopathy.   Psychiatric/Behavioral: Negative for agitation and confusion.   All other systems reviewed and are negative.      Physical Exam     Initial Vitals [08/13/17 1420]   BP Pulse Resp Temp SpO2   (!) 157/106 105 18 98.6 °F (37 °C) 99 %      MAP       123         Physical Exam    Nursing note and vitals reviewed.  Constitutional: She appears well-developed and well-nourished. No distress.   HENT:   Head: Normocephalic and atraumatic.   Right Ear: External ear normal.   Left Ear: External ear normal.   Nose: Nose normal.   Mouth/Throat: Oropharynx is clear and moist.   Eyes: Conjunctivae and EOM are normal.   Neck: Normal range of motion. No tracheal deviation present.   Cardiovascular: Normal rate and regular rhythm.   Pulmonary/Chest: Breath sounds normal. No respiratory distress. She has no wheezes. She has no rhonchi. She has no rales.   Genitourinary:       Pelvic exam was performed with patient supine.   Musculoskeletal: Normal range of motion. She exhibits no tenderness.   Neurological: She is alert and oriented to person, place, and time. She has normal strength.   Skin: Skin is warm and dry. No rash noted. No erythema.   Psychiatric: She has a normal mood and affect. Thought content normal.         ED Course   I & D - Incision and Drainage  Date/Time: 8/13/2017 4:41 PM  Location procedure was performed: [facility] EMERGENCY DEPARTMENT  Performed by: NURYS BOYD  Authorized by: HARMONY NGUYEN JR   Consent Done: Yes  Consent given by: patient  Type: abscess  Body area: anogenital  Location details: Bartholin's gland  Anesthesia: local infiltration    Anesthesia:  Local Anesthetic: lidocaine 1% without epinephrine  Anesthetic total: 2 mL  Patient sedated: no  Scalpel size: 11  Incision type: single straight  Complexity:  "simple  Drainage: pus  Drainage amount: copious  Wound treatment: incision,  wound left open,  deloculation and  wound packed  Packing material: 1/2 in gauze  Complications: No  Estimated blood loss (mL): 2  Specimens: No  Implants: No  Patient tolerance: Patient tolerated the procedure well with no immediate complications        Labs Reviewed   CULTURE, AEROBIC  (SPECIFY SOURCE)             Medical Decision Making:   Initial Assessment:   Bartholin cyst  Differential Diagnosis:   Bartholin cyst, folliculitis   ED Management:  Patient presents in NAD, afebrile and non-toxic.  There is marked swelling with fluctuance to L labia with little to no evidence of erythema.  Area was I&D and produced a copious amount of purulent material. Wound culture sent.  Packing placed.  Bleeding was controlled initially and upon movement she continued to bleed.  1/2" gauze was placed with the same outcome.  Patient was given ABD pads and instructed to sit on this area while being monitored in the ED.  She continued with bleeding and packing was inadvertently removed.  Patient has repeatedly requested for the packing to be removed, stating that a a foam was used by her OB/GYN in the past to stop the bleeding.  Sliver nitrate was applied and bleeding continued.  Area was again packed with 1/2" gauze and tail was also packed to prevent inadvertent removal.  She became anxious that she had continued bleeding and felt like she was loosing a lot of blood.  She was assured that this area would ooze and that she would have some blood associated with the oozing, but that this was normal.  Shew as instructed to f/u with Dr. Alves for further evaluation of this area. Strict return precautions given and patient verbalized understanding.      RX: clindamycin   Other:   I discussed test(s) with the performing physician.  I have discussed this case with another health care provider.                   ED Course     Clinical Impression:   The " encounter diagnosis was Bartholin cyst.                           Tonie Acosta PA-C  08/13/17 [id number]

## 2017-08-13 NOTE — ED NOTES
Bartholin cyst that has been recurring throughout 3rd trimester of pregnancy.  Delivered ,  delivery.  Friday began having increased discomfort and swelling to area that has increased since that time.  Denies fever.

## 2017-08-14 ENCOUNTER — PATIENT MESSAGE (OUTPATIENT)
Dept: OBSTETRICS AND GYNECOLOGY | Facility: CLINIC | Age: 37
End: 2017-08-14

## 2017-08-14 NOTE — TELEPHONE ENCOUNTER
Please advise to my chart encounter:    I offered patient 9:45 am slot on Wednesday but she wants to know if the packing can stay in that long. She was told 48 hrs tops, she would have to see another physician if needs to be seen sooner.

## 2017-08-14 NOTE — TELEPHONE ENCOUNTER
Please advise to my chart encounter:    Patient PP visit scheduled September 1 please advise if she needs to be seen sooner.

## 2017-08-14 NOTE — TELEPHONE ENCOUNTER
Yes it can stay in that long and actually would like it to stay in 1 week but I will make the decision on Wednesday whether to pull it or not.

## 2017-08-15 LAB — BACTERIA SPEC AEROBE CULT: NORMAL

## 2017-08-15 NOTE — PROVIDER PROGRESS NOTES - EMERGENCY DEPT.
Encounter Date: 8/13/2017    ED Physician Progress Notes        Physician Note:   08/14/2017 5:15 PM Patient was send home clindamycin; sensitivity pending.     08/15/17 5:40 PM - culture is sensitive to clindamycin.  No further f/u needed. JULIENM

## 2017-08-16 ENCOUNTER — POSTPARTUM VISIT (OUTPATIENT)
Dept: OBSTETRICS AND GYNECOLOGY | Facility: CLINIC | Age: 37
End: 2017-08-16
Payer: COMMERCIAL

## 2017-08-16 ENCOUNTER — TELEPHONE (OUTPATIENT)
Dept: OBSTETRICS AND GYNECOLOGY | Facility: CLINIC | Age: 37
End: 2017-08-16

## 2017-08-16 VITALS
SYSTOLIC BLOOD PRESSURE: 126 MMHG | DIASTOLIC BLOOD PRESSURE: 88 MMHG | HEIGHT: 67 IN | BODY MASS INDEX: 24.81 KG/M2 | WEIGHT: 158.06 LBS

## 2017-08-16 DIAGNOSIS — N75.1 BARTHOLIN'S GLAND ABSCESS: Primary | ICD-10-CM

## 2017-08-16 PROCEDURE — 3008F BODY MASS INDEX DOCD: CPT | Mod: S$GLB,,, | Performed by: OBSTETRICS & GYNECOLOGY

## 2017-08-16 PROCEDURE — 99999 PR PBB SHADOW E&M-EST. PATIENT-LVL II: CPT | Mod: PBBFAC,,, | Performed by: OBSTETRICS & GYNECOLOGY

## 2017-08-16 PROCEDURE — 99212 OFFICE O/P EST SF 10 MIN: CPT | Mod: 24,S$GLB,, | Performed by: OBSTETRICS & GYNECOLOGY

## 2017-08-16 RX ORDER — SULFAMETHOXAZOLE AND TRIMETHOPRIM 800; 160 MG/1; MG/1
1 TABLET ORAL 2 TIMES DAILY
Qty: 20 TABLET | Refills: 0 | Status: SHIPPED | OUTPATIENT
Start: 2017-08-16 | End: 2017-09-01

## 2017-08-16 NOTE — PROGRESS NOTES
"OBSTETRIC HISTORY:   OB History      Para Term  AB Living    4 2 0 2 2 2    SAB TAB Ectopic Multiple Live Births    2     0 2         COMPREHENSIVE GYN HISTORY:  PAP History: Denies abnormal Paps.  Infection History: Denies STDs. Denies PID.  Benign History: Denies uterine fibroids. Denies ovarian cysts. Denies endometriosis.   Cancer History: Denies cervical cancer. Denies uterine cancer or hyperplasia. Denies ovarian cancer. Denies vulvar cancer or pre-cancer. Denies vaginal cancer or pre-cancer. Denies breast cancer. Denies colon cancer.  Sexual Activity History:  has no sexual activity history on file.   Menstrual History: Age of menarche: 13 years. Every 28 days, flows for 5 days. Moderate flow.  Dysmenorrhea History: Reports severe dysmenorrhea.  Contraception History: None      HPI:   36 y.o.  No LMP recorded.   Patient is  here for follow up from ED after drainage of Bartholin's Gland abscess. A culture was obtained and antibiotics will need to be changed. The packing fell out last night. She denies bladder, breast and bowel complaints.    ROS:  GENERAL: Denies weight gain or weight loss. Feeling well overall.   SKIN: Denies rash or lesions.   HEAD: Denies headache.   NODES: Denies enlarged lymph nodes.   CHEST: Denies shortness of breath.   ABDOMEN: No abdominal pain, constipation, diarrhea, nausea, vomiting or rectal bleeding.   URINARY: No frequency, dysuria, hematuria, or burning on urination.  REPRODUCTIVE: See HPI.   BREASTS: The patient denies pain, lumps, or nipple discharge.   HEMATOLOGIC: No easy bruisability.   MUSCULOSKELETAL: Denies joint pain or back pain.   NEUROLOGIC: Denies weakness.   PSYCHIATRIC: Denies depression, anxiety or mood swings.    PE:   /88   Ht 5' 7" (1.702 m)   Wt 71.7 kg (158 lb 1.1 oz)   Breastfeeding? Yes   BMI 24.76 kg/m²   APPEARANCE: Well nourished, well developed, in no acute distress.  ABDOMEN: Soft. No tenderness or masses. No hernias.  PELVIC: "   VULVA: Opened incision at left Bartholin's gland without erythema. Minimal drainage is noted.    ASSESSMENT:  Bartholin's Gland follow up    PLAN:  RTO for PP visit

## 2017-08-22 ENCOUNTER — PATIENT MESSAGE (OUTPATIENT)
Dept: OBSTETRICS AND GYNECOLOGY | Facility: CLINIC | Age: 37
End: 2017-08-22

## 2017-08-27 ENCOUNTER — TELEPHONE (OUTPATIENT)
Dept: OBSTETRICS AND GYNECOLOGY | Facility: CLINIC | Age: 37
End: 2017-08-27

## 2017-09-01 ENCOUNTER — POSTPARTUM VISIT (OUTPATIENT)
Dept: OBSTETRICS AND GYNECOLOGY | Facility: CLINIC | Age: 37
End: 2017-09-01
Payer: COMMERCIAL

## 2017-09-01 VITALS
DIASTOLIC BLOOD PRESSURE: 84 MMHG | HEIGHT: 67 IN | SYSTOLIC BLOOD PRESSURE: 136 MMHG | BODY MASS INDEX: 25.3 KG/M2 | WEIGHT: 161.19 LBS

## 2017-09-01 DIAGNOSIS — Z12.4 SCREENING FOR CERVICAL CANCER: Primary | ICD-10-CM

## 2017-09-01 PROCEDURE — 88175 CYTOPATH C/V AUTO FLUID REDO: CPT

## 2017-09-01 PROCEDURE — 99999 PR PBB SHADOW E&M-EST. PATIENT-LVL II: CPT | Mod: PBBFAC,,, | Performed by: OBSTETRICS & GYNECOLOGY

## 2017-09-01 PROCEDURE — 0503F POSTPARTUM CARE VISIT: CPT | Mod: S$GLB,,, | Performed by: OBSTETRICS & GYNECOLOGY

## 2017-09-01 NOTE — PROGRESS NOTES
"OBSTETRIC HISTORY:   OB History      Para Term  AB Living    4 2 0 2 2 2    SAB TAB Ectopic Multiple Live Births    2     0 2         COMPREHENSIVE GYN HISTORY:  PAP History: Denies abnormal Paps.  Infection History: Denies STDs. Denies PID.  Benign History: Denies uterine fibroids. Denies ovarian cysts. Denies endometriosis.   Cancer History: Denies cervical cancer. Denies uterine cancer or hyperplasia. Denies ovarian cancer. Denies vulvar cancer or pre-cancer. Denies vaginal cancer or pre-cancer. Denies breast cancer. Denies colon cancer.  Sexual Activity History:  has no sexual activity history on file.   Menstrual History: Age of menarche: 13 years. Every 28 days, flows for 5 days. Moderate flow.  Dysmenorrhea History: Reports severe dysmenorrhea.  Contraception History: Male vasectomy         HPI:   36 y.o.  No LMP recorded.   Patient is  here for postpartum exam and is breast feeding. No depression.  had vasectomy. She is getting a "stinging feeling" in her breasts but states baby is not latching appropriately but does not have thrush. She denies bladder and bowel complaints.    ROS:  GENERAL: Denies weight gain or weight loss. Feeling well overall.   SKIN: Denies rash or lesions.   HEAD: Denies headache.   NODES: Denies enlarged lymph nodes.   CHEST: Denies shortness of breath.   ABDOMEN: No abdominal pain, constipation, diarrhea, nausea, vomiting or rectal bleeding.   URINARY: No frequency, dysuria, hematuria, or burning on urination.  REPRODUCTIVE: See HPI.   BREASTS: The patient denies lumps, or nipple discharge.   HEMATOLOGIC: No easy bruisability.   MUSCULOSKELETAL: Denies joint pain or back pain.   NEUROLOGIC: Denies weakness.   PSYCHIATRIC: Denies depression, anxiety or mood swings.    PE:   /84   Ht 5' 7" (1.702 m)   Wt 73.1 kg (161 lb 2.5 oz)   Breastfeeding? Yes   BMI 25.24 kg/m²   APPEARANCE: Well nourished, well developed, in no acute distress.  NECK: Neck symmetric " without  thyromegaly.  NODES: No inguinal, cervical lymph node enlargement.  CHEST: Lungs clear to auscultation.  HEART: Regular rate and rhythm, no murmurs, rubs or gallops.  ABDOMEN: Soft. No tenderness or masses. No hernias. Incision well healed  BREASTS: Symmetrical, some erythema of right nipple which maybe early candidiasis. No palpable masses, nipple discharge or adenopathy bilaterally.  PELVIC:   VULVA: Well healed scar left Bartholins gland. Normal female genitalia.  URETHRAL MEATUS: Normal size and location, no lesions, no prolapse.  URETHRA: No masses, tenderness, prolapse or scarring.  VAGINA: Moist and well rugated, no discharge, no significant cystocele or rectocele.  CERVIX: No lesions and discharge.  UTERUS: Normal size, regular shape, mobile, non-tender, bladder base nontender.  ADNEXA: No masses or tenderness.    PROCEDURES:  Pap smear    Assessment/Plan:  PP Exam-- resume all normal activities except crunches-- wait 4 months  Normally heavy periods (can't do OCP because of CHTN and MTHFR deficiency) so will see how periods are once she stops breast feeding but may want uterine ablation. If does ablation consider doing marsupialization of left Bartholins Gland for recurrent abscess.  Start probiotics and if any worsening of breast pain start Diflucan-- Rx sent  RTO 1 year

## 2017-09-21 ENCOUNTER — PATIENT MESSAGE (OUTPATIENT)
Dept: OBSTETRICS AND GYNECOLOGY | Facility: CLINIC | Age: 37
End: 2017-09-21

## 2017-09-21 NOTE — TELEPHONE ENCOUNTER
Is she still giving baby breast milk in any form? Since just light ok. Also, could it be coming from her Bartholin's gland??

## 2017-09-21 NOTE — TELEPHONE ENCOUNTER
Please note patient's mychart response:    I only breastfeed. It's from the vagina. I checked. But really light and I was over doing  moving stuff around yesterday but even then it was only gone for a day or 2  before it started again. More annoying and worried. U know me. Lol

## 2017-09-22 NOTE — TELEPHONE ENCOUNTER
Can add to schedule next week if she wants to be seen but there is nothing I can do to treat it unless it was a yeast infection. I would not be concerned about it

## 2018-07-06 ENCOUNTER — PATIENT MESSAGE (OUTPATIENT)
Dept: OBSTETRICS AND GYNECOLOGY | Facility: CLINIC | Age: 38
End: 2018-07-06

## 2018-07-08 ENCOUNTER — PATIENT MESSAGE (OUTPATIENT)
Dept: OBSTETRICS AND GYNECOLOGY | Facility: CLINIC | Age: 38
End: 2018-07-08

## 2018-07-09 ENCOUNTER — PATIENT MESSAGE (OUTPATIENT)
Dept: OBSTETRICS AND GYNECOLOGY | Facility: CLINIC | Age: 38
End: 2018-07-09

## 2018-07-09 NOTE — TELEPHONE ENCOUNTER
Apologize as a message was sent to her regarding going to Urgent Care and therefore the message was considered complated since the office was closed on Friday for maintenance. She can come Wednesday at 8:30am.

## 2018-11-08 ENCOUNTER — PATIENT MESSAGE (OUTPATIENT)
Dept: OBSTETRICS AND GYNECOLOGY | Facility: CLINIC | Age: 38
End: 2018-11-08

## 2018-11-08 ENCOUNTER — OFFICE VISIT (OUTPATIENT)
Dept: OBSTETRICS AND GYNECOLOGY | Facility: CLINIC | Age: 38
End: 2018-11-08
Payer: COMMERCIAL

## 2018-11-08 VITALS
BODY MASS INDEX: 23.76 KG/M2 | WEIGHT: 151.69 LBS | DIASTOLIC BLOOD PRESSURE: 100 MMHG | SYSTOLIC BLOOD PRESSURE: 136 MMHG

## 2018-11-08 DIAGNOSIS — N61.0 ACUTE MASTITIS: Primary | ICD-10-CM

## 2018-11-08 PROCEDURE — 99213 OFFICE O/P EST LOW 20 MIN: CPT | Mod: S$GLB,,, | Performed by: OBSTETRICS & GYNECOLOGY

## 2018-11-08 PROCEDURE — 3008F BODY MASS INDEX DOCD: CPT | Mod: CPTII,S$GLB,, | Performed by: OBSTETRICS & GYNECOLOGY

## 2018-11-08 PROCEDURE — 99999 PR PBB SHADOW E&M-EST. PATIENT-LVL II: CPT | Mod: PBBFAC,,, | Performed by: OBSTETRICS & GYNECOLOGY

## 2018-11-08 RX ORDER — SULFAMETHOXAZOLE AND TRIMETHOPRIM 800; 160 MG/1; MG/1
1 TABLET ORAL 2 TIMES DAILY
Qty: 14 TABLET | Refills: 0 | Status: SHIPPED | OUTPATIENT
Start: 2018-11-08 | End: 2018-12-28 | Stop reason: SDUPTHER

## 2018-11-08 RX ORDER — FLUCONAZOLE 200 MG/1
TABLET ORAL
Qty: 22 TABLET | Refills: 0 | Status: SHIPPED | OUTPATIENT
Start: 2018-11-08 | End: 2021-06-03

## 2018-11-08 NOTE — PROGRESS NOTES
OBSTETRIC HISTORY:   OB History      Para Term  AB Living    4 2 0 2 2 2    SAB TAB Ectopic Multiple Live Births    2     0 2         COMPREHENSIVE GYN HISTORY:  PAP History: Denies abnormal Paps.  Infection History: Denies STDs. Denies PID.  Benign History: Denies uterine fibroids. Denies ovarian cysts. Denies endometriosis.   Cancer History: Denies cervical cancer. Denies uterine cancer or hyperplasia. Denies ovarian cancer. Denies vulvar cancer or pre-cancer. Denies vaginal cancer or pre-cancer. Denies breast cancer. Denies colon cancer.  Sexual Activity History:  has no sexual activity history on file.   Menstrual History: Age of menarche: 13 years. Every 28 days, flows for 5 days. Moderate flow.  Dysmenorrhea History: Reports severe dysmenorrhea.  Contraception History: None           HPI:   38 y.o.  Patient's last menstrual period was 10/11/2018.   Patient is  here for breast pain of right nipple, still breast feeding pain with latching, fever at home (99.5 here), chills, body aches.  Baby does not have thrush. She denies bladder and bowel complaints.    ROS:  GENERAL: Denies weight gain or weight loss. Feeling well overall.   SKIN: Denies rash or lesions.   HEAD: Denies headache.   NODES: Denies enlarged lymph nodes.   CHEST: Denies shortness of breath.   ABDOMEN: No abdominal pain, constipation, diarrhea, nausea, vomiting or rectal bleeding.   URINARY: No frequency, dysuria, hematuria, or burning on urination.  REPRODUCTIVE: See HPI.   BREASTS: See HPI.   HEMATOLOGIC: No easy bruisability.   MUSCULOSKELETAL: Denies joint pain or back pain.   NEUROLOGIC: Denies weakness.   PSYCHIATRIC: Denies depression, anxiety or mood swings.    PE:   BP (!) 136/100   Wt 68.8 kg (151 lb 10.8 oz)   LMP 10/11/2018   BMI 23.76 kg/m²   PE:   APPEARANCE: Well nourished, well developed, in no acute distress.  CHEST: Lungs clear to auscultation.  HEART: Regular rate and rhythm, no murmurs, rubs or  gallops.  ABDOMEN: Soft. No tenderness or masses. No hernias.  BREASTS: Symmetrical, erythema of bottom portion of nipple only. No masses and no other erythema and no engorgement.    ASSESSMENT:  Nipple mastitis  Possible candidiasis      PLAN:  Allergic to PCN will do Bactrim but any worsening go to ED or RTO  Treat for candida  Alternate Tylenol and Motrin

## 2018-11-08 NOTE — TELEPHONE ENCOUNTER
Patient notified and verbalized understanding.  Patient states she is on her way, she's right down the street

## 2018-12-28 ENCOUNTER — TELEPHONE (OUTPATIENT)
Dept: OBSTETRICS AND GYNECOLOGY | Facility: CLINIC | Age: 38
End: 2018-12-28

## 2018-12-28 RX ORDER — SULFAMETHOXAZOLE AND TRIMETHOPRIM 800; 160 MG/1; MG/1
1 TABLET ORAL 2 TIMES DAILY
Qty: 14 TABLET | Refills: 0 | Status: SHIPPED | OUTPATIENT
Start: 2018-12-28 | End: 2021-06-03

## 2018-12-28 NOTE — TELEPHONE ENCOUNTER
Patient states that she has mastitis again. Left breast, all red and its tender and warm to the touch. Patient wants to know if you can send something to pharmacy. Drug allergies and pharmacy verified. Please advise

## 2018-12-28 NOTE — TELEPHONE ENCOUNTER
----- Message from Neleam Ariza sent at 12/28/2018  9:36 AM CST -----  Contact: Self [phone]  Patient would like to speak with you about having Mastitis and would like to know if she can get medication or does she have to come in. Please advise

## 2019-02-15 ENCOUNTER — TELEPHONE (OUTPATIENT)
Dept: FAMILY MEDICINE | Facility: CLINIC | Age: 39
End: 2019-02-15
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
## 2021-02-12 ENCOUNTER — IMMUNIZATION (OUTPATIENT)
Dept: PHARMACY | Facility: CLINIC | Age: 41
End: 2021-02-12
Payer: COMMERCIAL

## 2021-02-12 DIAGNOSIS — Z23 NEED FOR VACCINATION: Primary | ICD-10-CM

## 2021-03-12 ENCOUNTER — IMMUNIZATION (OUTPATIENT)
Dept: PHARMACY | Facility: CLINIC | Age: 41
End: 2021-03-12
Payer: COMMERCIAL

## 2021-03-12 DIAGNOSIS — Z23 NEED FOR VACCINATION: Primary | ICD-10-CM

## 2021-06-03 ENCOUNTER — TELEPHONE (OUTPATIENT)
Dept: INTERNAL MEDICINE | Facility: CLINIC | Age: 41
End: 2021-06-03

## 2021-06-03 DIAGNOSIS — J01.80 ACUTE NON-RECURRENT SINUSITIS OF OTHER SINUS: Primary | ICD-10-CM

## 2021-06-03 RX ORDER — AZELASTINE 1 MG/ML
1 SPRAY, METERED NASAL 2 TIMES DAILY
Qty: 30 ML | Refills: 3 | Status: SHIPPED | OUTPATIENT
Start: 2021-06-03 | End: 2021-08-25

## 2021-06-03 RX ORDER — FLUTICASONE PROPIONATE 50 MCG
2 SPRAY, SUSPENSION (ML) NASAL DAILY
Qty: 16 G | Refills: 12 | Status: SHIPPED | OUTPATIENT
Start: 2021-06-03 | End: 2021-07-03

## 2021-06-03 RX ORDER — AZITHROMYCIN 250 MG/1
TABLET, FILM COATED ORAL
Qty: 6 TABLET | Refills: 0 | Status: SHIPPED | OUTPATIENT
Start: 2021-06-03 | End: 2021-06-08

## 2021-11-11 DIAGNOSIS — J01.80 ACUTE NON-RECURRENT SINUSITIS OF OTHER SINUS: ICD-10-CM

## 2021-11-15 RX ORDER — AZELASTINE 1 MG/ML
SPRAY, METERED NASAL
Qty: 30 ML | Refills: 0 | Status: SHIPPED | OUTPATIENT
Start: 2021-11-15 | End: 2021-12-08

## 2021-11-29 ENCOUNTER — TELEPHONE (OUTPATIENT)
Dept: ADMINISTRATIVE | Facility: OTHER | Age: 41
End: 2021-11-29
Payer: COMMERCIAL

## 2021-12-07 DIAGNOSIS — J01.80 ACUTE NON-RECURRENT SINUSITIS OF OTHER SINUS: ICD-10-CM

## 2021-12-08 RX ORDER — AZELASTINE 1 MG/ML
SPRAY, METERED NASAL
Qty: 30 ML | Refills: 0 | Status: SHIPPED | OUTPATIENT
Start: 2021-12-08 | End: 2021-12-13 | Stop reason: SDUPTHER

## 2021-12-09 ENCOUNTER — TELEPHONE (OUTPATIENT)
Dept: FAMILY MEDICINE | Facility: CLINIC | Age: 41
End: 2021-12-09
Payer: COMMERCIAL

## 2021-12-09 DIAGNOSIS — Z12.31 ENCOUNTER FOR SCREENING MAMMOGRAM FOR MALIGNANT NEOPLASM OF BREAST: ICD-10-CM

## 2021-12-09 DIAGNOSIS — Z01.419 WELL WOMAN EXAM: Primary | ICD-10-CM

## 2021-12-09 DIAGNOSIS — Z02.0 ENCOUNTER FOR SCHOOL EXAMINATION: ICD-10-CM

## 2021-12-09 DIAGNOSIS — Z11.59 NEED FOR HEPATITIS C SCREENING TEST: ICD-10-CM

## 2021-12-10 ENCOUNTER — LAB VISIT (OUTPATIENT)
Dept: LAB | Facility: HOSPITAL | Age: 41
End: 2021-12-10
Attending: FAMILY MEDICINE
Payer: COMMERCIAL

## 2021-12-10 DIAGNOSIS — Z02.0 ENCOUNTER FOR SCHOOL EXAMINATION: ICD-10-CM

## 2021-12-10 DIAGNOSIS — Z11.59 NEED FOR HEPATITIS C SCREENING TEST: ICD-10-CM

## 2021-12-10 DIAGNOSIS — Z01.419 WELL WOMAN EXAM: ICD-10-CM

## 2021-12-10 LAB
25(OH)D3+25(OH)D2 SERPL-MCNC: 37 NG/ML (ref 30–96)
ALBUMIN SERPL BCP-MCNC: 4.3 G/DL (ref 3.5–5.2)
ALP SERPL-CCNC: 51 U/L (ref 55–135)
ALT SERPL W/O P-5'-P-CCNC: 8 U/L (ref 10–44)
ANION GAP SERPL CALC-SCNC: 11 MMOL/L (ref 8–16)
AST SERPL-CCNC: 14 U/L (ref 10–40)
BASOPHILS # BLD AUTO: 0.05 K/UL (ref 0–0.2)
BASOPHILS NFR BLD: 0.6 % (ref 0–1.9)
BILIRUB SERPL-MCNC: 0.7 MG/DL (ref 0.1–1)
BUN SERPL-MCNC: 17 MG/DL (ref 6–20)
CALCIUM SERPL-MCNC: 9.3 MG/DL (ref 8.7–10.5)
CHLORIDE SERPL-SCNC: 104 MMOL/L (ref 95–110)
CHOLEST SERPL-MCNC: 178 MG/DL (ref 120–199)
CHOLEST/HDLC SERPL: 3 {RATIO} (ref 2–5)
CO2 SERPL-SCNC: 24 MMOL/L (ref 23–29)
CREAT SERPL-MCNC: 0.8 MG/DL (ref 0.5–1.4)
DIFFERENTIAL METHOD: ABNORMAL
EOSINOPHIL # BLD AUTO: 0.2 K/UL (ref 0–0.5)
EOSINOPHIL NFR BLD: 2.1 % (ref 0–8)
ERYTHROCYTE [DISTWIDTH] IN BLOOD BY AUTOMATED COUNT: 12.4 % (ref 11.5–14.5)
EST. GFR  (AFRICAN AMERICAN): >60 ML/MIN/1.73 M^2
EST. GFR  (NON AFRICAN AMERICAN): >60 ML/MIN/1.73 M^2
ESTIMATED AVG GLUCOSE: 100 MG/DL (ref 68–131)
GLUCOSE SERPL-MCNC: 96 MG/DL (ref 70–110)
HBA1C MFR BLD: 5.1 % (ref 4–5.6)
HCT VFR BLD AUTO: 45 % (ref 37–48.5)
HDLC SERPL-MCNC: 59 MG/DL (ref 40–75)
HDLC SERPL: 33.1 % (ref 20–50)
HGB BLD-MCNC: 14.3 G/DL (ref 12–16)
IMM GRANULOCYTES # BLD AUTO: 0.05 K/UL (ref 0–0.04)
IMM GRANULOCYTES NFR BLD AUTO: 0.6 % (ref 0–0.5)
LDLC SERPL CALC-MCNC: 108 MG/DL (ref 63–159)
LYMPHOCYTES # BLD AUTO: 1.9 K/UL (ref 1–4.8)
LYMPHOCYTES NFR BLD: 22.4 % (ref 18–48)
MCH RBC QN AUTO: 29.5 PG (ref 27–31)
MCHC RBC AUTO-ENTMCNC: 31.8 G/DL (ref 32–36)
MCV RBC AUTO: 93 FL (ref 82–98)
MONOCYTES # BLD AUTO: 0.5 K/UL (ref 0.3–1)
MONOCYTES NFR BLD: 5.3 % (ref 4–15)
NEUTROPHILS # BLD AUTO: 5.9 K/UL (ref 1.8–7.7)
NEUTROPHILS NFR BLD: 69 % (ref 38–73)
NONHDLC SERPL-MCNC: 119 MG/DL
NRBC BLD-RTO: 0 /100 WBC
PLATELET # BLD AUTO: 339 K/UL (ref 150–450)
PMV BLD AUTO: 10 FL (ref 9.2–12.9)
POTASSIUM SERPL-SCNC: 3.6 MMOL/L (ref 3.5–5.1)
PROT SERPL-MCNC: 7.6 G/DL (ref 6–8.4)
RBC # BLD AUTO: 4.84 M/UL (ref 4–5.4)
SODIUM SERPL-SCNC: 139 MMOL/L (ref 136–145)
TRIGL SERPL-MCNC: 55 MG/DL (ref 30–150)
TSH SERPL DL<=0.005 MIU/L-ACNC: 1.6 UIU/ML (ref 0.4–4)
WBC # BLD AUTO: 8.6 K/UL (ref 3.9–12.7)

## 2021-12-10 PROCEDURE — 83036 HEMOGLOBIN GLYCOSYLATED A1C: CPT | Performed by: FAMILY MEDICINE

## 2021-12-10 PROCEDURE — 86762 RUBELLA ANTIBODY: CPT | Performed by: FAMILY MEDICINE

## 2021-12-10 PROCEDURE — 86787 VARICELLA-ZOSTER ANTIBODY: CPT | Performed by: FAMILY MEDICINE

## 2021-12-10 PROCEDURE — 80053 COMPREHEN METABOLIC PANEL: CPT | Performed by: FAMILY MEDICINE

## 2021-12-10 PROCEDURE — 80061 LIPID PANEL: CPT | Performed by: FAMILY MEDICINE

## 2021-12-10 PROCEDURE — 86706 HEP B SURFACE ANTIBODY: CPT | Performed by: FAMILY MEDICINE

## 2021-12-10 PROCEDURE — 86765 RUBEOLA ANTIBODY: CPT | Performed by: FAMILY MEDICINE

## 2021-12-10 PROCEDURE — 85025 COMPLETE CBC W/AUTO DIFF WBC: CPT | Performed by: FAMILY MEDICINE

## 2021-12-10 PROCEDURE — 84443 ASSAY THYROID STIM HORMONE: CPT | Performed by: FAMILY MEDICINE

## 2021-12-10 PROCEDURE — 86803 HEPATITIS C AB TEST: CPT | Performed by: FAMILY MEDICINE

## 2021-12-10 PROCEDURE — 86480 TB TEST CELL IMMUN MEASURE: CPT | Performed by: FAMILY MEDICINE

## 2021-12-10 PROCEDURE — 36415 COLL VENOUS BLD VENIPUNCTURE: CPT | Mod: PO | Performed by: FAMILY MEDICINE

## 2021-12-10 PROCEDURE — 86735 MUMPS ANTIBODY: CPT | Performed by: FAMILY MEDICINE

## 2021-12-10 PROCEDURE — 82306 VITAMIN D 25 HYDROXY: CPT | Performed by: FAMILY MEDICINE

## 2021-12-13 ENCOUNTER — OFFICE VISIT (OUTPATIENT)
Dept: FAMILY MEDICINE | Facility: CLINIC | Age: 41
End: 2021-12-13
Payer: COMMERCIAL

## 2021-12-13 VITALS
WEIGHT: 158 LBS | DIASTOLIC BLOOD PRESSURE: 80 MMHG | HEIGHT: 67 IN | BODY MASS INDEX: 24.8 KG/M2 | SYSTOLIC BLOOD PRESSURE: 130 MMHG

## 2021-12-13 DIAGNOSIS — Z76.89 ENCOUNTER TO ESTABLISH CARE WITH NEW DOCTOR: ICD-10-CM

## 2021-12-13 DIAGNOSIS — Z12.31 ENCOUNTER FOR SCREENING MAMMOGRAM FOR MALIGNANT NEOPLASM OF BREAST: ICD-10-CM

## 2021-12-13 DIAGNOSIS — Z23 NEED FOR MENINGITIS VACCINATION: ICD-10-CM

## 2021-12-13 DIAGNOSIS — Z23 NEED FOR HEPATITIS B VACCINATION: ICD-10-CM

## 2021-12-13 DIAGNOSIS — Z23 NEED FOR HEPATITIS A VACCINATION: ICD-10-CM

## 2021-12-13 DIAGNOSIS — Z01.419 WELL WOMAN EXAM: Primary | ICD-10-CM

## 2021-12-13 DIAGNOSIS — Z12.4 CERVICAL CANCER SCREENING: ICD-10-CM

## 2021-12-13 DIAGNOSIS — J32.9 CHRONIC SINUSITIS, UNSPECIFIED LOCATION: ICD-10-CM

## 2021-12-13 DIAGNOSIS — Z91.09 ENVIRONMENTAL ALLERGIES: ICD-10-CM

## 2021-12-13 DIAGNOSIS — Z80.8 FAMILY HISTORY OF SKIN CANCER: ICD-10-CM

## 2021-12-13 PROBLEM — Z34.93 ENCOUNTER FOR SUPERVISION OF NORMAL PREGNANCY IN THIRD TRIMESTER: Status: RESOLVED | Noted: 2017-02-06 | Resolved: 2021-12-13

## 2021-12-13 PROBLEM — O16.9 HYPERTENSION IN PREGNANCY, ANTEPARTUM: Status: RESOLVED | Noted: 2017-02-06 | Resolved: 2021-12-13

## 2021-12-13 LAB
HBV SURFACE AB SER QL IA: NEGATIVE
HBV SURFACE AB SERPL IA-ACNC: 6 MIU/ML
HCV AB SERPL QL IA: NEGATIVE
MUMPS IGG INTERPRETATION: POSITIVE
MUMPS IGG SCREEN: 1.58 ISR (ref 0–0.9)
RUBEOLA IGG ANTIBODY: 2.13 ISR (ref 0–0.9)
RUBEOLA INTERPRETATION: POSITIVE
RUBV IGG SER-ACNC: 47.6 IU/ML
RUBV IGG SER-IMP: REACTIVE
VARICELLA INTERPRETATION: POSITIVE
VARICELLA ZOSTER IGG: 3.12 ISR (ref 0–0.9)

## 2021-12-13 PROCEDURE — 99999 PR PBB SHADOW E&M-EST. PATIENT-LVL IV: ICD-10-PCS | Mod: PBBFAC,,, | Performed by: FAMILY MEDICINE

## 2021-12-13 PROCEDURE — 90734 MENINGOCOCCAL CONJUGATE VACCINE 4-VALENT IM (MENVEO): ICD-10-PCS | Mod: S$GLB,,, | Performed by: FAMILY MEDICINE

## 2021-12-13 PROCEDURE — 90734 MENACWYD/MENACWYCRM VACC IM: CPT | Mod: S$GLB,,, | Performed by: FAMILY MEDICINE

## 2021-12-13 PROCEDURE — 90471 IMMUNIZATION ADMIN: CPT | Mod: S$GLB,,, | Performed by: FAMILY MEDICINE

## 2021-12-13 PROCEDURE — 90472 MENINGOCOCCAL CONJUGATE VACCINE 4-VALENT IM (MENVEO): ICD-10-PCS | Mod: S$GLB,,, | Performed by: FAMILY MEDICINE

## 2021-12-13 PROCEDURE — 90636 HEPATITIS A HEPATITIS B COMBINED VACCINE IM: ICD-10-PCS | Mod: S$GLB,,, | Performed by: FAMILY MEDICINE

## 2021-12-13 PROCEDURE — 99386 PREV VISIT NEW AGE 40-64: CPT | Mod: 25,S$GLB,, | Performed by: FAMILY MEDICINE

## 2021-12-13 PROCEDURE — 99386 PR PREVENTIVE VISIT,NEW,40-64: ICD-10-PCS | Mod: 25,S$GLB,, | Performed by: FAMILY MEDICINE

## 2021-12-13 PROCEDURE — 90472 IMMUNIZATION ADMIN EACH ADD: CPT | Mod: S$GLB,,, | Performed by: FAMILY MEDICINE

## 2021-12-13 PROCEDURE — 90636 HEP A/HEP B VACC ADULT IM: CPT | Mod: S$GLB,,, | Performed by: FAMILY MEDICINE

## 2021-12-13 PROCEDURE — 99999 PR PBB SHADOW E&M-EST. PATIENT-LVL IV: CPT | Mod: PBBFAC,,, | Performed by: FAMILY MEDICINE

## 2021-12-13 PROCEDURE — 90471 HEPATITIS A HEPATITIS B COMBINED VACCINE IM: ICD-10-PCS | Mod: S$GLB,,, | Performed by: FAMILY MEDICINE

## 2021-12-13 RX ORDER — IPRATROPIUM BROMIDE 21 UG/1
2 SPRAY, METERED NASAL 2 TIMES DAILY
Qty: 30 ML | Refills: 1 | Status: SHIPPED | OUTPATIENT
Start: 2021-12-13 | End: 2022-01-12

## 2021-12-13 RX ORDER — AZELASTINE 1 MG/ML
SPRAY, METERED NASAL
Qty: 30 ML | Refills: 2 | Status: SHIPPED | OUTPATIENT
Start: 2021-12-13 | End: 2022-04-18

## 2021-12-14 LAB
GAMMA INTERFERON BACKGROUND BLD IA-ACNC: 0.02 IU/ML
M TB IFN-G CD4+ BCKGRND COR BLD-ACNC: 0.02 IU/ML
MITOGEN IGNF BCKGRD COR BLD-ACNC: 7.93 IU/ML
TB GOLD PLUS: NEGATIVE
TB2 - NIL: 0.03 IU/ML

## 2021-12-27 ENCOUNTER — PATIENT MESSAGE (OUTPATIENT)
Dept: FAMILY MEDICINE | Facility: CLINIC | Age: 41
End: 2021-12-27
Payer: COMMERCIAL

## 2021-12-27 DIAGNOSIS — R05.9 COUGH: Primary | ICD-10-CM

## 2021-12-27 LAB
CTP QC/QA: YES
SARS-COV-2 RDRP RESP QL NAA+PROBE: POSITIVE

## 2021-12-27 PROCEDURE — U0002 COVID-19 LAB TEST NON-CDC: HCPCS | Mod: QW,S$GLB,, | Performed by: FAMILY MEDICINE

## 2021-12-27 PROCEDURE — U0002: ICD-10-PCS | Mod: QW,S$GLB,, | Performed by: FAMILY MEDICINE

## 2022-01-11 DIAGNOSIS — Z91.09 ENVIRONMENTAL ALLERGIES: ICD-10-CM

## 2022-01-11 NOTE — TELEPHONE ENCOUNTER
No new care gaps identified.  Powered by [employer]. Reference number: [id number].   1/11/2022 5:47:43 PM CST

## 2022-01-12 RX ORDER — IPRATROPIUM BROMIDE 21 UG/1
SPRAY, METERED NASAL
Qty: 90 ML | Refills: 11 | Status: SHIPPED | OUTPATIENT
Start: 2022-01-12

## 2022-01-13 ENCOUNTER — CLINICAL SUPPORT (OUTPATIENT)
Dept: FAMILY MEDICINE | Facility: CLINIC | Age: 42
End: 2022-01-13
Payer: COMMERCIAL

## 2022-01-13 DIAGNOSIS — Z23 NEED FOR HEPATITIS A AND B VACCINATION: Primary | ICD-10-CM

## 2022-01-13 PROCEDURE — 90636 HEP A/HEP B VACC ADULT IM: CPT | Mod: S$GLB,,, | Performed by: FAMILY MEDICINE

## 2022-01-13 PROCEDURE — 90636 HEPATITIS A HEPATITIS B COMBINED VACCINE IM: ICD-10-PCS | Mod: S$GLB,,, | Performed by: FAMILY MEDICINE

## 2022-01-13 PROCEDURE — 90471 IMMUNIZATION ADMIN: CPT | Mod: S$GLB,,, | Performed by: FAMILY MEDICINE

## 2022-01-13 PROCEDURE — 90471 HEPATITIS A HEPATITIS B COMBINED VACCINE IM: ICD-10-PCS | Mod: S$GLB,,, | Performed by: FAMILY MEDICINE

## 2022-01-16 ENCOUNTER — IMMUNIZATION (OUTPATIENT)
Dept: PRIMARY CARE CLINIC | Facility: CLINIC | Age: 42
End: 2022-01-16
Payer: COMMERCIAL

## 2022-01-16 DIAGNOSIS — Z23 NEED FOR VACCINATION: Primary | ICD-10-CM

## 2022-01-16 PROCEDURE — 0064A COVID-19, MRNA, LNP-S, PF, 100 MCG/0.25 ML DOSE VACCINE (MODERNA BOOSTER): CPT | Mod: PBBFAC,CV19 | Performed by: INTERNAL MEDICINE

## 2022-01-22 ENCOUNTER — HOSPITAL ENCOUNTER (OUTPATIENT)
Dept: RADIOLOGY | Facility: HOSPITAL | Age: 42
Discharge: HOME | End: 2022-01-22
Attending: FAMILY MEDICINE
Payer: COMMERCIAL

## 2022-01-22 DIAGNOSIS — Z12.31 ENCOUNTER FOR SCREENING MAMMOGRAM FOR MALIGNANT NEOPLASM OF BREAST: ICD-10-CM

## 2022-01-22 PROCEDURE — 77067 SCR MAMMO BI INCL CAD: CPT | Mod: 26,,, | Performed by: RADIOLOGY

## 2022-01-22 PROCEDURE — 77067 SCR MAMMO BI INCL CAD: CPT | Mod: TC

## 2022-01-22 PROCEDURE — 77063 MAMMO DIGITAL SCREENING BILAT WITH TOMO: ICD-10-PCS | Mod: 26,,, | Performed by: RADIOLOGY

## 2022-01-22 PROCEDURE — 77063 BREAST TOMOSYNTHESIS BI: CPT | Mod: TC

## 2022-01-22 PROCEDURE — 77063 BREAST TOMOSYNTHESIS BI: CPT | Mod: 26,,, | Performed by: RADIOLOGY

## 2022-01-22 PROCEDURE — 77067 MAMMO DIGITAL SCREENING BILAT WITH TOMO: ICD-10-PCS | Mod: 26,,, | Performed by: RADIOLOGY

## 2022-04-17 DIAGNOSIS — Z91.09 ENVIRONMENTAL ALLERGIES: ICD-10-CM

## 2022-04-17 NOTE — TELEPHONE ENCOUNTER
No new care gaps identified.  Powered by [employer]. Reference number: [id number].   4/17/2022 12:14:12 AM CDT

## 2022-04-18 RX ORDER — AZELASTINE 1 MG/ML
SPRAY, METERED NASAL
Qty: 90 ML | Refills: 2 | Status: SHIPPED | OUTPATIENT
Start: 2022-04-18 | End: 2023-06-29 | Stop reason: SDUPTHER

## 2022-04-18 NOTE — TELEPHONE ENCOUNTER
Refill Authorization Note   Crystal Hurley  is requesting a refill authorization.  Brief Assessment and Rationale for Refill:  Approve     Medication Therapy Plan:       Medication Reconciliation Completed: No   Comments:     No Care Gaps recommended.     Note composed:7:15 AM 04/18/2022

## 2022-05-19 ENCOUNTER — TELEPHONE (OUTPATIENT)
Dept: FAMILY MEDICINE | Facility: CLINIC | Age: 42
End: 2022-05-19
Payer: COMMERCIAL

## 2022-05-31 ENCOUNTER — PATIENT MESSAGE (OUTPATIENT)
Dept: ADMINISTRATIVE | Facility: HOSPITAL | Age: 42
End: 2022-05-31
Payer: COMMERCIAL

## 2022-06-29 ENCOUNTER — CLINICAL SUPPORT (OUTPATIENT)
Dept: FAMILY MEDICINE | Facility: CLINIC | Age: 42
End: 2022-06-29
Payer: COMMERCIAL

## 2022-06-29 DIAGNOSIS — Z23 NEED FOR HEPATITIS A AND B VACCINATION: Primary | ICD-10-CM

## 2022-06-29 PROCEDURE — 90471 HEPATITIS A HEPATITIS B COMBINED VACCINE IM: ICD-10-PCS | Mod: S$GLB,,, | Performed by: FAMILY MEDICINE

## 2022-06-29 PROCEDURE — 90636 HEPATITIS A HEPATITIS B COMBINED VACCINE IM: ICD-10-PCS | Mod: S$GLB,,, | Performed by: FAMILY MEDICINE

## 2022-06-29 PROCEDURE — 99999 PR PBB SHADOW E&M-EST. PATIENT-LVL I: CPT | Mod: PBBFAC,,,

## 2022-06-29 PROCEDURE — 90471 IMMUNIZATION ADMIN: CPT | Mod: S$GLB,,, | Performed by: FAMILY MEDICINE

## 2022-06-29 PROCEDURE — 90636 HEP A/HEP B VACC ADULT IM: CPT | Mod: S$GLB,,, | Performed by: FAMILY MEDICINE

## 2022-06-29 PROCEDURE — 99999 PR PBB SHADOW E&M-EST. PATIENT-LVL I: ICD-10-PCS | Mod: PBBFAC,,,

## 2022-07-14 ENCOUNTER — PATIENT MESSAGE (OUTPATIENT)
Dept: FAMILY MEDICINE | Facility: CLINIC | Age: 42
End: 2022-07-14
Payer: COMMERCIAL

## 2022-07-14 RX ORDER — DOXYCYCLINE HYCLATE 100 MG
100 TABLET ORAL EVERY 12 HOURS
Qty: 14 TABLET | Refills: 0 | Status: SHIPPED | OUTPATIENT
Start: 2022-07-14 | End: 2022-07-21

## 2022-08-24 ENCOUNTER — PATIENT MESSAGE (OUTPATIENT)
Dept: ADMINISTRATIVE | Facility: HOSPITAL | Age: 42
End: 2022-08-24
Payer: COMMERCIAL

## 2022-09-13 ENCOUNTER — PATIENT MESSAGE (OUTPATIENT)
Dept: FAMILY MEDICINE | Facility: CLINIC | Age: 42
End: 2022-09-13
Payer: COMMERCIAL

## 2022-09-13 DIAGNOSIS — Z02.0 SCHOOL PHYSICAL EXAM: Primary | ICD-10-CM

## 2022-09-15 ENCOUNTER — LAB VISIT (OUTPATIENT)
Dept: LAB | Facility: HOSPITAL | Age: 42
End: 2022-09-15
Attending: FAMILY MEDICINE
Payer: COMMERCIAL

## 2022-09-15 DIAGNOSIS — Z02.0 SCHOOL PHYSICAL EXAM: ICD-10-CM

## 2022-09-15 PROCEDURE — 36415 COLL VENOUS BLD VENIPUNCTURE: CPT | Mod: PO | Performed by: FAMILY MEDICINE

## 2022-09-15 PROCEDURE — 86706 HEP B SURFACE ANTIBODY: CPT | Performed by: FAMILY MEDICINE

## 2022-09-19 LAB
HBV SURFACE AB SER QL IA: POSITIVE
HBV SURFACE AB SERPL IA-ACNC: NORMAL MIU/ML

## 2022-10-10 ENCOUNTER — PATIENT MESSAGE (OUTPATIENT)
Dept: ADMINISTRATIVE | Facility: HOSPITAL | Age: 42
End: 2022-10-10
Payer: COMMERCIAL

## 2022-10-31 ENCOUNTER — PATIENT MESSAGE (OUTPATIENT)
Dept: FAMILY MEDICINE | Facility: CLINIC | Age: 42
End: 2022-10-31
Payer: COMMERCIAL

## 2022-10-31 DIAGNOSIS — Z11.1 SCREENING-PULMONARY TB: Primary | ICD-10-CM

## 2022-11-02 ENCOUNTER — LAB VISIT (OUTPATIENT)
Dept: LAB | Facility: HOSPITAL | Age: 42
End: 2022-11-02
Attending: FAMILY MEDICINE
Payer: COMMERCIAL

## 2022-11-02 DIAGNOSIS — Z11.1 SCREENING-PULMONARY TB: ICD-10-CM

## 2022-11-02 PROCEDURE — 86480 TB TEST CELL IMMUN MEASURE: CPT | Performed by: FAMILY MEDICINE

## 2022-11-03 LAB
GAMMA INTERFERON BACKGROUND BLD IA-ACNC: 0.01 IU/ML
M TB IFN-G CD4+ BCKGRND COR BLD-ACNC: 0.03 IU/ML
MITOGEN IGNF BCKGRD COR BLD-ACNC: 9.99 IU/ML
TB GOLD PLUS: NEGATIVE
TB2 - NIL: 0.06 IU/ML

## 2023-02-08 DIAGNOSIS — Z12.31 OTHER SCREENING MAMMOGRAM: ICD-10-CM

## 2023-02-24 NOTE — TELEPHONE ENCOUNTER
Initial Anesthesia Post-op Note    Patient: Ingrid Charles  Procedure(s) Performed: DILATION AND CURETTAGE  Anesthesia type: L&D Epidural    Vitals Value Taken Time   Temp 36 02/24/23 1739   Pulse 114 02/24/23 1654   Resp 12 02/24/23 1739   SpO2 95 % 02/24/23 1654   BP 95/75 02/24/23 1739   Vitals shown include unvalidated device data.      Patient Location: PACU Phase 1  Post-op Vital Signs:stable  Level of Consciousness: awake and alert  Respiratory Status: spontaneous ventilation  Cardiovascular stable and blood pressure returned to baseline  Hydration: euvolemic  Pain Management: adequately controlled  Handoff: Handoff to receiving clinician was performed and questions were answered  Vomiting: none  Nausea: None  Airway Patency:patent  Post-op Assessment: no complications, patient tolerated procedure well with no complications and regional anesthetic in place - able to participate      No notable events documented.   Ordered, please schedule.

## 2023-04-11 ENCOUNTER — PATIENT MESSAGE (OUTPATIENT)
Dept: ADMINISTRATIVE | Facility: HOSPITAL | Age: 43
End: 2023-04-11
Payer: COMMERCIAL

## 2023-04-25 ENCOUNTER — PATIENT MESSAGE (OUTPATIENT)
Dept: FAMILY MEDICINE | Facility: CLINIC | Age: 43
End: 2023-04-25
Payer: COMMERCIAL

## 2023-06-29 DIAGNOSIS — Z91.09 ENVIRONMENTAL ALLERGIES: ICD-10-CM

## 2023-06-29 RX ORDER — AZELASTINE 1 MG/ML
2 SPRAY, METERED NASAL 2 TIMES DAILY
Qty: 30 ML | Refills: 1 | Status: SHIPPED | OUTPATIENT
Start: 2023-06-29 | End: 2023-07-13

## 2023-06-29 NOTE — TELEPHONE ENCOUNTER
Refill Routing Note   Medication(s) are not appropriate for processing by [facility] for the following reason(s):      Patient not seen by PCP within 15 months    [facility] action(s):  Defer None identified            Appointments  past 12m or future 3m with PCP    Date Provider   Last Visit   12/13/2021 Ankur Schmidt DO   Next Visit   Visit date not found Ankur Schmidt DO   ED visits in past 90 days: 0        Note composed:8:56 AM 06/29/2023

## 2023-06-29 NOTE — TELEPHONE ENCOUNTER
No care due was identified.  Health [facility] Embedded Care Due Messages. Reference number: [id number].   6/29/2023 6:13:08 AM CDT

## 2023-07-13 DIAGNOSIS — Z91.09 ENVIRONMENTAL ALLERGIES: ICD-10-CM

## 2023-07-13 RX ORDER — AZELASTINE 1 MG/ML
2 SPRAY, METERED NASAL 2 TIMES DAILY
Qty: 30 ML | Refills: 3 | Status: SHIPPED | OUTPATIENT
Start: 2023-07-13

## 2023-07-13 NOTE — TELEPHONE ENCOUNTER
No care due was identified.  Health [facility] Embedded Care Due Messages. Reference number: [id number].   7/13/2023 11:02:19 AM CDT

## 2023-10-31 ENCOUNTER — TELEPHONE (OUTPATIENT)
Dept: INTERNAL MEDICINE | Facility: CLINIC | Age: 43
End: 2023-10-31
Payer: COMMERCIAL

## 2023-10-31 DIAGNOSIS — Z23 FLU VACCINE NEED: Primary | ICD-10-CM

## 2023-11-01 ENCOUNTER — CLINICAL SUPPORT (OUTPATIENT)
Dept: FAMILY MEDICINE | Facility: CLINIC | Age: 43
End: 2023-11-01
Payer: COMMERCIAL

## 2023-11-01 DIAGNOSIS — Z23 NEED FOR VACCINATION: Primary | ICD-10-CM

## 2023-11-01 PROCEDURE — 99999 PR PBB SHADOW E&M-EST. PATIENT-LVL I: ICD-10-PCS | Mod: PBBFAC,,,

## 2023-11-01 PROCEDURE — G0008 ADMIN INFLUENZA VIRUS VAC: HCPCS | Mod: S$GLB,,, | Performed by: FAMILY MEDICINE

## 2023-11-01 PROCEDURE — 99999 PR PBB SHADOW E&M-EST. PATIENT-LVL I: CPT | Mod: PBBFAC,,,

## 2023-11-01 PROCEDURE — 90686 FLU VACCINE (QUAD) GREATER THAN OR EQUAL TO 3YO PRESERVATIVE FREE IM: ICD-10-PCS | Mod: S$GLB,,, | Performed by: FAMILY MEDICINE

## 2023-11-01 PROCEDURE — G0008 FLU VACCINE (QUAD) GREATER THAN OR EQUAL TO 3YO PRESERVATIVE FREE IM: ICD-10-PCS | Mod: S$GLB,,, | Performed by: FAMILY MEDICINE

## 2023-11-01 PROCEDURE — 90686 IIV4 VACC NO PRSV 0.5 ML IM: CPT | Mod: S$GLB,,, | Performed by: FAMILY MEDICINE

## 2023-12-07 ENCOUNTER — PATIENT MESSAGE (OUTPATIENT)
Dept: FAMILY MEDICINE | Facility: CLINIC | Age: 43
End: 2023-12-07
Payer: COMMERCIAL

## 2023-12-07 DIAGNOSIS — Z11.1 SCREENING-PULMONARY TB: Primary | ICD-10-CM

## 2023-12-13 ENCOUNTER — LAB VISIT (OUTPATIENT)
Dept: LAB | Facility: HOSPITAL | Age: 43
End: 2023-12-13
Attending: FAMILY MEDICINE
Payer: COMMERCIAL

## 2023-12-13 DIAGNOSIS — Z11.1 SCREENING-PULMONARY TB: ICD-10-CM

## 2023-12-13 PROCEDURE — 86480 TB TEST CELL IMMUN MEASURE: CPT | Performed by: FAMILY MEDICINE

## 2023-12-14 LAB
GAMMA INTERFERON BACKGROUND BLD IA-ACNC: 0.03 IU/ML
M TB IFN-G CD4+ BCKGRND COR BLD-ACNC: 0.06 IU/ML
M TB IFN-G CD4+ BCKGRND COR BLD-ACNC: 0.06 IU/ML
MITOGEN IGNF BCKGRD COR BLD-ACNC: 9.96 IU/ML
TB GOLD PLUS: NEGATIVE

## 2024-03-19 ENCOUNTER — PATIENT MESSAGE (OUTPATIENT)
Dept: FAMILY MEDICINE | Facility: CLINIC | Age: 44
End: 2024-03-19
Payer: COMMERCIAL

## 2024-04-01 ENCOUNTER — HOSPITAL ENCOUNTER (OUTPATIENT)
Dept: RADIOLOGY | Facility: HOSPITAL | Age: 44
Discharge: HOME | End: 2024-04-01
Attending: FAMILY MEDICINE
Payer: COMMERCIAL

## 2024-04-01 ENCOUNTER — TELEPHONE (OUTPATIENT)
Dept: FAMILY MEDICINE | Facility: CLINIC | Age: 44
End: 2024-04-01
Payer: COMMERCIAL

## 2024-04-01 DIAGNOSIS — Z12.31 SCREENING MAMMOGRAM FOR BREAST CANCER: ICD-10-CM

## 2024-04-01 DIAGNOSIS — Z12.31 SCREENING MAMMOGRAM FOR BREAST CANCER: Primary | ICD-10-CM

## 2024-04-01 PROCEDURE — 77063 BREAST TOMOSYNTHESIS BI: CPT | Mod: 26,,, | Performed by: RADIOLOGY

## 2024-04-01 PROCEDURE — 77063 BREAST TOMOSYNTHESIS BI: CPT | Mod: TC

## 2024-04-01 PROCEDURE — 77067 SCR MAMMO BI INCL CAD: CPT | Mod: 26,,, | Performed by: RADIOLOGY

## 2024-07-15 ENCOUNTER — OFFICE VISIT (OUTPATIENT)
Dept: OBSTETRICS AND GYNECOLOGY | Facility: CLINIC | Age: 44
End: 2024-07-15
Payer: COMMERCIAL

## 2024-07-15 VITALS — WEIGHT: 159.63 LBS | SYSTOLIC BLOOD PRESSURE: 140 MMHG | DIASTOLIC BLOOD PRESSURE: 108 MMHG | BODY MASS INDEX: 25 KG/M2

## 2024-07-15 DIAGNOSIS — Z12.4 ROUTINE CERVICAL SMEAR: ICD-10-CM

## 2024-07-15 DIAGNOSIS — Z01.419 WELL WOMAN EXAM WITH ROUTINE GYNECOLOGICAL EXAM: Primary | ICD-10-CM

## 2024-07-15 PROCEDURE — 1160F RVW MEDS BY RX/DR IN RCRD: CPT | Mod: CPTII,S$GLB,, | Performed by: OBSTETRICS & GYNECOLOGY

## 2024-07-15 PROCEDURE — 1159F MED LIST DOCD IN RCRD: CPT | Mod: CPTII,S$GLB,, | Performed by: OBSTETRICS & GYNECOLOGY

## 2024-07-15 PROCEDURE — 99396 PREV VISIT EST AGE 40-64: CPT | Mod: S$GLB,,, | Performed by: OBSTETRICS & GYNECOLOGY

## 2024-07-15 PROCEDURE — 87624 HPV HI-RISK TYP POOLED RSLT: CPT | Performed by: OBSTETRICS & GYNECOLOGY

## 2024-07-15 PROCEDURE — 99999 PR PBB SHADOW E&M-EST. PATIENT-LVL III: CPT | Mod: PBBFAC,,, | Performed by: OBSTETRICS & GYNECOLOGY

## 2024-07-15 PROCEDURE — 3077F SYST BP >= 140 MM HG: CPT | Mod: CPTII,S$GLB,, | Performed by: OBSTETRICS & GYNECOLOGY

## 2024-07-15 PROCEDURE — 3080F DIAST BP >= 90 MM HG: CPT | Mod: CPTII,S$GLB,, | Performed by: OBSTETRICS & GYNECOLOGY

## 2024-07-15 PROCEDURE — 3008F BODY MASS INDEX DOCD: CPT | Mod: CPTII,S$GLB,, | Performed by: OBSTETRICS & GYNECOLOGY

## 2024-07-15 NOTE — PROGRESS NOTES
OBSTETRIC HISTORY:   OB History          18    Para   15    Term   13       2    AB   2    Living   2         SAB   2    IAB        Ectopic        Multiple   0    Live Births   2                  COMPREHENSIVE GYN HISTORY:  PAP History: Denies abnormal Paps.  Infection History: Denies STDs. Denies PID.  Benign History: Denies uterine fibroids. Denies ovarian cysts. Denies endometriosis.   Cancer History: Denies cervical cancer. Denies uterine cancer or hyperplasia. Denies ovarian cancer. Denies vulvar cancer or pre-cancer. Denies vaginal cancer or pre-cancer. Denies breast cancer. Denies colon cancer.  Sexual Activity History:  has no sexual activity history on file.   Menstrual History: Age of menarche: 13 years. Every 28 days, flows for 5 days. Moderate flow.  Dysmenorrhea History: Reports severe dysmenorrhea.  Contraception History: None       HPI:   43 y.o.  Patient's last menstrual period was 2024.   Patient is  here for her annual gynecologic exam.  She has no GYN complaints. She denies bladder, breast and bowel complaints.    ROS:  GENERAL: No weight gain or weight loss.   CHEST: No shortness of breath.   ABDOMEN: No abdominal pain, constipation, diarrhea, nausea, vomiting or rectal bleeding.   URINARY: No frequency, dysuria, hematuria, or burning on urination.  REPRODUCTIVE: See HPI.   BREASTS: No breast pain, lumps, or nipple discharge.   PSYCHIATRIC: No depression or anxiety.    PE:   BP (!) 140/108   Wt 72.4 kg (159 lb 9.8 oz)   LMP 2024   BMI 25.00 kg/m²   APPEARANCE: Well nourished, well developed, in no acute distress.  NECK: Neck symmetric without  thyromegaly.  NODES: No inguinal, cervical lymph node enlargement.  CHEST: Lungs clear to auscultation.  HEART: Regular rate and rhythm, no murmurs, rubs or gallops.  ABDOMEN: Soft. No tenderness or masses. No hernias.  BREASTS: Symmetrical, no skin changes or visible lesions. No palpable masses, nipple discharge or adenopathy  bilaterally.  PELVIC:   VULVA: No lesions. Normal female genitalia. Patient reports that her Bartholin cyst has marsupialized and is draining on its own.  URETHRAL MEATUS: Normal size and location, no lesions, no prolapse.  URETHRA: No masses, tenderness, prolapse or scarring.  VAGINA: Moist and well rugated, no discharge, no significant cystocele or rectocele.  CERVIX: No lesions and discharge.  UTERUS: Normal size, regular shape, mobile, non-tender, bladder base nontender.  ADNEXA: No masses or tenderness.    PROCEDURES:  Pap smear  HRHPV    Assessment:  Normal Gynecologic Exam    Plan:  Mammogram and Colonoscopy if indicated by current recommendations.  Return to clinic in one year or for any problems or complaints.    Counseling:  Patient was counseled today on A.C.S. Pap guidelines and recommendations for yearly pelvic exams and monthly self breast exams; to see her PCP for other health maintenance.     As of April 1, 2021, the Cures Act has been passed nationally. This new law requires that all doctors progress notes, lab results, pathology reports and radiology reports be released IMMEDIATELY to the patient in the patient portal. That means that the results are released to you at the EXACT same time they are released to me. Therefore, with all of the patients that I have I am not able to reply to each patient exactly when the results come in. So there will be a delay from when you see the results to when I see them and have time to come up with a response to send you. Also I only see these results when I am on the computer at work. So if the results come in over the weekend or after 5 pm of a work day, I will not see them until the next business day. As you can tell, this is a challenge as a physician to give every patient the quick response they hope for and deserve. So please be patient!     Thanks for understanding,

## 2024-07-22 LAB
HPV HR 12 DNA SPEC QL NAA+PROBE: NEGATIVE
HPV16 AG SPEC QL: NEGATIVE
HPV18 DNA SPEC QL NAA+PROBE: NEGATIVE

## 2024-08-15 ENCOUNTER — PATIENT MESSAGE (OUTPATIENT)
Dept: FAMILY MEDICINE | Facility: CLINIC | Age: 44
End: 2024-08-15
Payer: COMMERCIAL

## 2024-08-15 DIAGNOSIS — Z91.09 ENVIRONMENTAL ALLERGIES: ICD-10-CM

## 2024-08-15 RX ORDER — IPRATROPIUM BROMIDE 21 UG/1
1 SPRAY, METERED NASAL 2 TIMES DAILY
Qty: 90 ML | Refills: 11 | Status: SHIPPED | OUTPATIENT
Start: 2024-08-15

## 2024-08-15 RX ORDER — AZELASTINE 1 MG/ML
2 SPRAY, METERED NASAL 2 TIMES DAILY
Qty: 30 ML | Refills: 3 | Status: SHIPPED | OUTPATIENT
Start: 2024-08-15 | End: 2024-08-15 | Stop reason: SDUPTHER

## 2024-08-15 RX ORDER — FLUTICASONE PROPIONATE 50 MCG
2 SPRAY, SUSPENSION (ML) NASAL DAILY
Qty: 16 G | Refills: 12 | Status: SHIPPED | OUTPATIENT
Start: 2024-08-15 | End: 2024-09-14

## 2024-08-15 RX ORDER — AZELASTINE 1 MG/ML
2 SPRAY, METERED NASAL 2 TIMES DAILY
Qty: 30 ML | Refills: 3 | Status: SHIPPED | OUTPATIENT
Start: 2024-08-15

## 2024-08-15 RX ORDER — AZELASTINE 1 MG/ML
2 SPRAY, METERED NASAL 2 TIMES DAILY
Qty: 30 ML | Refills: 3 | OUTPATIENT
Start: 2024-08-15

## 2024-08-15 RX ORDER — FLUTICASONE PROPIONATE 50 MCG
2 SPRAY, SUSPENSION (ML) NASAL DAILY
Qty: 16 G | Refills: 12 | OUTPATIENT
Start: 2024-08-15 | End: 2024-09-14

## 2024-08-15 NOTE — TELEPHONE ENCOUNTER
Care Due:                  Date            Visit Type   Department     Provider  --------------------------------------------------------------------------------    Last Visit: None Found      None         None Found  Next Visit: None Scheduled  None         None Found                                                            Last  Test          Frequency    Reason                     Performed    Due Date  --------------------------------------------------------------------------------    Office Visit  15 months..  azelastine...............  Not Found    Overdue    [employer] Embedded Care Due Messages. Reference number: [id number].   8/15/2024 7:53:19 AM CDT

## 2024-08-15 NOTE — TELEPHONE ENCOUNTER
No care due was identified.  [facility] Embedded Care Due Messages. Reference number: [id number].   8/15/2024 8:08:59 AM CDT

## 2024-08-15 NOTE — TELEPHONE ENCOUNTER
Refill Decision Note   Crystal Hurley  is requesting a refill authorization.  Brief Assessment and Rationale for Refill:  Quick Discontinue     Medication Therapy Plan:  Receipt confirmed by pharmacy (8/15/2024  1:33 PM CDT)      Comments:     Note composed:1:45 PM 08/15/2024

## 2024-08-20 ENCOUNTER — LAB VISIT (OUTPATIENT)
Dept: LAB | Facility: HOSPITAL | Age: 44
End: 2024-08-20
Attending: FAMILY MEDICINE
Payer: COMMERCIAL

## 2024-08-20 ENCOUNTER — OFFICE VISIT (OUTPATIENT)
Dept: FAMILY MEDICINE | Facility: CLINIC | Age: 44
End: 2024-08-20
Payer: COMMERCIAL

## 2024-08-20 VITALS
OXYGEN SATURATION: 98 % | TEMPERATURE: 99 F | HEART RATE: 79 BPM | DIASTOLIC BLOOD PRESSURE: 100 MMHG | WEIGHT: 159.63 LBS | HEIGHT: 67 IN | SYSTOLIC BLOOD PRESSURE: 158 MMHG | BODY MASS INDEX: 25.06 KG/M2

## 2024-08-20 DIAGNOSIS — R03.0 ELEVATED BLOOD PRESSURE READING: ICD-10-CM

## 2024-08-20 DIAGNOSIS — Z00.00 ENCOUNTER FOR ROUTINE HISTORY AND PHYSICAL EXAM IN FEMALE: Primary | ICD-10-CM

## 2024-08-20 DIAGNOSIS — Z00.00 ENCOUNTER FOR ROUTINE HISTORY AND PHYSICAL EXAM IN FEMALE: ICD-10-CM

## 2024-08-20 DIAGNOSIS — F41.9 ANXIETY: ICD-10-CM

## 2024-08-20 LAB
ALBUMIN SERPL BCP-MCNC: 4.1 G/DL (ref 3.5–5.2)
ALP SERPL-CCNC: 49 U/L (ref 55–135)
ALT SERPL W/O P-5'-P-CCNC: 11 U/L (ref 10–44)
ANION GAP SERPL CALC-SCNC: 9 MMOL/L (ref 8–16)
AST SERPL-CCNC: 16 U/L (ref 10–40)
BASOPHILS # BLD AUTO: 0.05 K/UL (ref 0–0.2)
BASOPHILS NFR BLD: 0.6 % (ref 0–1.9)
BILIRUB SERPL-MCNC: 1.7 MG/DL (ref 0.1–1)
BUN SERPL-MCNC: 11 MG/DL (ref 6–20)
CALCIUM SERPL-MCNC: 9.4 MG/DL (ref 8.7–10.5)
CHLORIDE SERPL-SCNC: 105 MMOL/L (ref 95–110)
CHOLEST SERPL-MCNC: 167 MG/DL (ref 120–199)
CHOLEST/HDLC SERPL: 3.3 {RATIO} (ref 2–5)
CO2 SERPL-SCNC: 23 MMOL/L (ref 23–29)
CREAT SERPL-MCNC: 0.8 MG/DL (ref 0.5–1.4)
DIFFERENTIAL METHOD BLD: NORMAL
EOSINOPHIL # BLD AUTO: 0.2 K/UL (ref 0–0.5)
EOSINOPHIL NFR BLD: 2.3 % (ref 0–8)
ERYTHROCYTE [DISTWIDTH] IN BLOOD BY AUTOMATED COUNT: 12.5 % (ref 11.5–14.5)
EST. GFR  (NO RACE VARIABLE): >60 ML/MIN/1.73 M^2
ESTIMATED AVG GLUCOSE: 97 MG/DL (ref 68–131)
GLUCOSE SERPL-MCNC: 109 MG/DL (ref 70–110)
HBA1C MFR BLD: 5 % (ref 4–5.6)
HCT VFR BLD AUTO: 44.9 % (ref 37–48.5)
HDLC SERPL-MCNC: 50 MG/DL (ref 40–75)
HDLC SERPL: 29.9 % (ref 20–50)
HGB BLD-MCNC: 14.5 G/DL (ref 12–16)
IMM GRANULOCYTES # BLD AUTO: 0.02 K/UL (ref 0–0.04)
IMM GRANULOCYTES NFR BLD AUTO: 0.3 % (ref 0–0.5)
LDLC SERPL CALC-MCNC: 101.4 MG/DL (ref 63–159)
LYMPHOCYTES # BLD AUTO: 1.7 K/UL (ref 1–4.8)
LYMPHOCYTES NFR BLD: 21.6 % (ref 18–48)
MAGNESIUM SERPL-MCNC: 2 MG/DL (ref 1.6–2.6)
MCH RBC QN AUTO: 29.8 PG (ref 27–31)
MCHC RBC AUTO-ENTMCNC: 32.3 G/DL (ref 32–36)
MCV RBC AUTO: 92 FL (ref 82–98)
MONOCYTES # BLD AUTO: 0.4 K/UL (ref 0.3–1)
MONOCYTES NFR BLD: 4.6 % (ref 4–15)
NEUTROPHILS # BLD AUTO: 5.6 K/UL (ref 1.8–7.7)
NEUTROPHILS NFR BLD: 70.6 % (ref 38–73)
NONHDLC SERPL-MCNC: 117 MG/DL
NRBC BLD-RTO: 0 /100 WBC
PHOSPHATE SERPL-MCNC: 3.7 MG/DL (ref 2.7–4.5)
PLATELET # BLD AUTO: 286 K/UL (ref 150–450)
PMV BLD AUTO: 10.3 FL (ref 9.2–12.9)
POTASSIUM SERPL-SCNC: 4.6 MMOL/L (ref 3.5–5.1)
PROT SERPL-MCNC: 7.1 G/DL (ref 6–8.4)
RBC # BLD AUTO: 4.87 M/UL (ref 4–5.4)
SODIUM SERPL-SCNC: 137 MMOL/L (ref 136–145)
TRIGL SERPL-MCNC: 78 MG/DL (ref 30–150)
TSH SERPL DL<=0.005 MIU/L-ACNC: 1.2 UIU/ML (ref 0.4–4)
WBC # BLD AUTO: 7.86 K/UL (ref 3.9–12.7)

## 2024-08-20 PROCEDURE — 99999 PR PBB SHADOW E&M-EST. PATIENT-LVL III: CPT | Mod: PBBFAC,,, | Performed by: FAMILY MEDICINE

## 2024-08-20 PROCEDURE — 36415 COLL VENOUS BLD VENIPUNCTURE: CPT | Mod: PO | Performed by: FAMILY MEDICINE

## 2024-08-20 PROCEDURE — 80053 COMPREHEN METABOLIC PANEL: CPT | Performed by: FAMILY MEDICINE

## 2024-08-20 PROCEDURE — 85025 COMPLETE CBC W/AUTO DIFF WBC: CPT | Performed by: FAMILY MEDICINE

## 2024-08-20 PROCEDURE — 84100 ASSAY OF PHOSPHORUS: CPT | Performed by: FAMILY MEDICINE

## 2024-08-20 PROCEDURE — 99213 OFFICE O/P EST LOW 20 MIN: CPT | Mod: PBBFAC,PO | Performed by: FAMILY MEDICINE

## 2024-08-20 PROCEDURE — 83036 HEMOGLOBIN GLYCOSYLATED A1C: CPT | Performed by: FAMILY MEDICINE

## 2024-08-20 PROCEDURE — 84443 ASSAY THYROID STIM HORMONE: CPT | Performed by: FAMILY MEDICINE

## 2024-08-20 PROCEDURE — 99396 PREV VISIT EST AGE 40-64: CPT | Mod: S$GLB,,, | Performed by: FAMILY MEDICINE

## 2024-08-20 PROCEDURE — 83735 ASSAY OF MAGNESIUM: CPT | Performed by: FAMILY MEDICINE

## 2024-08-20 PROCEDURE — 80061 LIPID PANEL: CPT | Performed by: FAMILY MEDICINE

## 2024-08-20 NOTE — PROGRESS NOTES
Subjective:       Patient ID: Crystal Hurley is a 43 y.o. female.    Chief Complaint: Annual Exam      Crystal Hurley is a 43 y.o. female who presents today for an annual exam    Diet: eats healthy.   Exercise: she works out frequently. 4-5 days a week.     Labs: ordered     She is having anxiety. She reports it has increased in the last 3 months. She is stressed out about her new career change. Has tried lexapro in the past. Didn't like the way it made her feel. She is concerned about weight gain and sedation.     She does not do self breast exams, but reports ob/gyn may have palpated something on her breasts. Recent mammogram was normal.     Elevated BP: stress related?    Colon Cancer Screening:   at 46    Mammogram: UTD  Pap: 7/24/2024      PMHx: reviewed in EMR and updated  Meds: reviewed in EMR and updated  Shx: reviewed in EMR and updated  FMHx: no family history of colon cancer, breast cancer, ovarian cancer  Social: lives with dad, son and daughter. 2 dogs at home along with 2 cats and a turtle. No smokers at home.         Review of Systems   Constitutional:  Negative for chills and fever.   Respiratory:  Negative for shortness of breath.    Cardiovascular:  Negative for chest pain.   Gastrointestinal:  Negative for nausea and vomiting.   Neurological:  Negative for dizziness, light-headedness and headaches.   Psychiatric/Behavioral:  Positive for sleep disturbance. Negative for suicidal ideas. The patient is nervous/anxious.          Health Maintenance Due   Topic Date Due    COVID-19 Vaccine (5 - 2023-24 season) 09/01/2023     Immunization History   Administered Date(s) Administered    COVID-19 MRNA, LN-S PF (MODERNA HALF 0.25 ML DOSE) 01/16/2022    COVID-19, MRNA, LN-S, PF (MODERNA FULL 0.5 ML DOSE) 02/10/2021, 02/12/2021, 03/12/2021    Hepatitis A / Hepatitis B 12/13/2021, 01/13/2022, 06/29/2022    Influenza - Quadrivalent 11/04/2014, 10/07/2016, 10/14/2017    Influenza - Quadrivalent - MDCK - PF  "11/04/2019    Influenza - Quadrivalent - PF *Preferred* (6 months and older) 12/16/2015, 11/05/2018, 10/07/2020, 10/05/2021, 11/01/2023    Influenza - Trivalent - PF (ADULT) 12/16/2015    Influenza Split 12/16/2015    MMR 02/01/1983, 02/01/1995    Meningococcal Conjugate (MCV4O) 2 Vial (2mo-55yr) 12/13/2021    Tdap 10/01/1996, 07/07/2017           Objective:     Vitals:    08/20/24 1048 08/20/24 1158   BP: (!) 162/98 (!) 158/100   BP Location: Left arm    Patient Position: Sitting    BP Method: Large (Manual)    Pulse: 79    Temp: 98.6 °F (37 °C)    SpO2: 98%    Weight: 72.4 kg (159 lb 9.8 oz)    Height: 5' 7" (1.702 m)         Physical Exam  Vitals and nursing note reviewed.   Constitutional:       General: She is not in acute distress.     Appearance: She is not ill-appearing, toxic-appearing or diaphoretic.   Cardiovascular:      Rate and Rhythm: Normal rate and regular rhythm.   Pulmonary:      Effort: Pulmonary effort is normal.      Breath sounds: Normal breath sounds.   Chest:   Breasts:     Right: No swelling, inverted nipple, mass or skin change.      Left: No swelling, inverted nipple, mass or skin change.      Comments: Chaperone: student doctor Cara  Abdominal:      Palpations: Abdomen is soft.      Tenderness: There is no abdominal tenderness.   Musculoskeletal:         General: No swelling.   Lymphadenopathy:      Upper Body:      Right upper body: No supraclavicular or axillary adenopathy.      Left upper body: No supraclavicular or axillary adenopathy.   Neurological:      General: No focal deficit present.      Mental Status: She is alert.   Psychiatric:         Mood and Affect: Mood normal.         Behavior: Behavior normal.         Thought Content: Thought content normal.         Judgment: Judgment normal.         Assessment:       1. Encounter for routine history and physical exam in female    2. Elevated blood pressure reading    3. Anxiety    4. BMI 25.0-25.9,adult        Plan:         Needs " anxiety medication?  Needs BP medication? Anxious today  Monitor at home  120/80 or less    Declines valium      Encounter for routine history and physical exam in female  -     CBC Auto Differential; Future; Expected date: 08/20/2024  -     Comprehensive Metabolic Panel; Future; Expected date: 08/20/2024  -     Hemoglobin A1C; Future; Expected date: 08/20/2024  -     Lipid Panel; Future; Expected date: 08/20/2024  -     TSH; Future; Expected date: 08/20/2024  -     Magnesium; Future; Expected date: 08/20/2024  -     PHOSPHORUS; Future; Expected date: 08/20/2024    Elevated blood pressure reading  -     Pharmacogenomics Panel; Future; Expected date: 08/20/2024    Anxiety  -     CBC Auto Differential; Future; Expected date: 08/20/2024  -     Comprehensive Metabolic Panel; Future; Expected date: 08/20/2024  -     TSH; Future; Expected date: 08/20/2024  -     Pharmacogenomics Panel; Future; Expected date: 08/20/2024    BMI 25.0-25.9,adult

## 2024-08-21 ENCOUNTER — TELEPHONE (OUTPATIENT)
Dept: FAMILY MEDICINE | Facility: CLINIC | Age: 44
End: 2024-08-21
Payer: COMMERCIAL

## 2024-08-21 DIAGNOSIS — R17 HIGH BILIRUBIN: Primary | ICD-10-CM

## 2024-08-30 LAB
ONEOME COMMENT: NORMAL
ONEOME METHOD: NORMAL

## 2024-09-03 ENCOUNTER — TELEPHONE (OUTPATIENT)
Dept: FAMILY MEDICINE | Facility: CLINIC | Age: 44
End: 2024-09-03
Payer: COMMERCIAL

## 2024-09-03 DIAGNOSIS — F41.9 ANXIETY: Primary | ICD-10-CM

## 2024-09-03 RX ORDER — BUSPIRONE HYDROCHLORIDE 5 MG/1
5 TABLET ORAL 2 TIMES DAILY
Qty: 60 TABLET | Refills: 11 | Status: SHIPPED | OUTPATIENT
Start: 2024-09-03 | End: 2025-09-03

## 2024-09-03 NOTE — TELEPHONE ENCOUNTER
Reviewed pharmacogenomics panel  Has moderate interaction with multiple SSRI/effexor    Can try cymbalta?    Will start buspar 5 mg once daily   Increase to BID in 1 week

## 2024-09-20 ENCOUNTER — OFFICE VISIT (OUTPATIENT)
Dept: FAMILY MEDICINE | Facility: CLINIC | Age: 44
End: 2024-09-20
Payer: COMMERCIAL

## 2024-09-20 VITALS
BODY MASS INDEX: 24.96 KG/M2 | WEIGHT: 159 LBS | SYSTOLIC BLOOD PRESSURE: 156 MMHG | HEART RATE: 81 BPM | HEIGHT: 67 IN | DIASTOLIC BLOOD PRESSURE: 110 MMHG | OXYGEN SATURATION: 98 %

## 2024-09-20 DIAGNOSIS — F41.9 ANXIETY: ICD-10-CM

## 2024-09-20 DIAGNOSIS — I10 ESSENTIAL HYPERTENSION: Primary | ICD-10-CM

## 2024-09-20 PROCEDURE — 99999 PR PBB SHADOW E&M-EST. PATIENT-LVL III: CPT | Mod: PBBFAC,,, | Performed by: FAMILY MEDICINE

## 2024-09-20 RX ORDER — DIAZEPAM 2 MG/1
2 TABLET ORAL EVERY 6 HOURS PRN
Qty: 30 TABLET | Refills: 0 | Status: SHIPPED | OUTPATIENT
Start: 2024-09-20 | End: 2024-10-20

## 2024-09-20 RX ORDER — BUSPIRONE HYDROCHLORIDE 7.5 MG/1
7.5 TABLET ORAL NIGHTLY
Start: 2024-09-20 | End: 2025-09-20

## 2024-09-20 NOTE — PROGRESS NOTES
"Subjective:       Patient ID: Crystal Hurley is a 43 y.o. female.    Chief Complaint: Anxiety    Crystal is a 43 y.o. female who presents today for f/u on anxiety  Buspar is helping  Only taking 5 mg nightly  Makes her sleepy  But still having anxiety  Still having some panic    BP high but "always" high at doctors office  Has been high at doctors offices since teen years  At that time, had extensive evaluation that was normal    Does have strong family history of HTN including her parents, siblings, and other relatives    No symptoms of HTN    Sleep is better.       Review of Systems   Psychiatric/Behavioral:  Negative for self-injury, sleep disturbance and suicidal ideas. The patient is nervous/anxious.            Objective:     Vitals:    09/20/24 1150 09/20/24 1153   BP: (!) 170/98 (!) 156/110   Pulse: 81    SpO2: 98%    Weight: 72.1 kg (159 lb)    Height: 5' 7" (1.702 m)         Physical Exam  Vitals and nursing note reviewed.   Constitutional:       Appearance: She is not ill-appearing, toxic-appearing or diaphoretic.   Neurological:      Mental Status: She is alert.   Psychiatric:         Mood and Affect: Affect is tearful.         Assessment:       1. Essential hypertension    2. Anxiety        Plan:       Continue buspar  Only taking 5 mg nightly  Try 7.5 mg or 10 mg nightly  Can taper up  Can also add 5 mg in the AM  Valium short term prn 2 mg rx given    HTN?? Strong family history  Suspect white coat  But multiple pressures high  Will place diagnosis and digital HTN order.     Essential hypertension  -     Hypertension Digital Medicine (HDMP) Enrollment Order    Anxiety  -     busPIRone (BUSPAR) 7.5 MG tablet; Take 1 tablet (7.5 mg total) by mouth every evening.  -     diazePAM (VALIUM) 2 MG tablet; Take 1 tablet (2 mg total) by mouth every 6 (six) hours as needed for Anxiety.  Dispense: 30 tablet; Refill: 0        "

## 2024-10-09 ENCOUNTER — PATIENT MESSAGE (OUTPATIENT)
Dept: ADMINISTRATIVE | Facility: HOSPITAL | Age: 44
End: 2024-10-09
Payer: COMMERCIAL

## 2024-10-20 NOTE — PLAN OF CARE
Problem: Patient Care Overview  Goal: Plan of Care Review  Outcome: Ongoing (interventions implemented as appropriate)  Mom will pump/hand express at least 8+ times/24 hrs with [employer]phony breast pump as instructed. Will collect, label & store EBM as discussed. Will do skin to skin as soon as baby able to do so. Will call for any needs.        declines

## 2024-11-11 ENCOUNTER — TELEPHONE (OUTPATIENT)
Dept: OPHTHALMOLOGY | Facility: CLINIC | Age: 44
End: 2024-11-11
Payer: COMMERCIAL

## 2024-11-11 NOTE — TELEPHONE ENCOUNTER
"----- Message from Iris sent at 11/11/2024  2:48 PM CST -----  Good afternoon  One of my doctors here is requesting a sooner appointment.  Diagnosis "Mimi'  I scheduled her to see Dr Nash next week on Monday November18 but if possible she would like to see someone sooner  Any chance you can help me getting her in sooner.  She is in Clinic so if you could please let me know if possible, I will appreciate it.  Thanks    Iris  "

## 2024-11-18 ENCOUNTER — OFFICE VISIT (OUTPATIENT)
Dept: OPTOMETRY | Facility: CLINIC | Age: 44
End: 2024-11-18
Payer: COMMERCIAL

## 2024-11-18 DIAGNOSIS — H52.4 PRESBYOPIA: Primary | ICD-10-CM

## 2024-11-18 DIAGNOSIS — G43.109 OCULAR MIGRAINE: ICD-10-CM

## 2024-11-18 PROCEDURE — 99999 PR PBB SHADOW E&M-EST. PATIENT-LVL III: CPT | Mod: PBBFAC,,, | Performed by: OPTOMETRIST

## 2024-11-18 PROCEDURE — 3044F HG A1C LEVEL LT 7.0%: CPT | Mod: CPTII,S$GLB,, | Performed by: OPTOMETRIST

## 2024-11-18 PROCEDURE — 92015 DETERMINE REFRACTIVE STATE: CPT | Mod: S$GLB,,, | Performed by: OPTOMETRIST

## 2024-11-18 PROCEDURE — 92004 COMPRE OPH EXAM NEW PT 1/>: CPT | Mod: S$GLB,,, | Performed by: OPTOMETRIST

## 2024-11-18 PROCEDURE — 1160F RVW MEDS BY RX/DR IN RCRD: CPT | Mod: CPTII,S$GLB,, | Performed by: OPTOMETRIST

## 2024-11-18 PROCEDURE — 1159F MED LIST DOCD IN RCRD: CPT | Mod: CPTII,S$GLB,, | Performed by: OPTOMETRIST

## 2024-11-18 NOTE — PROGRESS NOTES
HPI    DLS: 10+ Years Ago    Patient is here today due to blurred va. States that she has noticed that   she is having trouble focusing. Denies pain. Occasional floaters when   straining at times. Denies flashes, glare, dryness and diplopia. Slight   photophobia when outdoors. States that she suffers with her sinuses and   uses lots of nasal spray, but no gtts.     No gtts  Last edited by Luci Ji on 11/18/2024 10:01 AM.            Assessment /Plan     For exam results, see Encounter Report.    Presbyopia    Ocular migraine      Presby sx--wrote reading Rx, or otc +1.25 OK.  Discussed CRIZAL for glare  1 episode oc migraine (reports had another episode in past, maybe 6 years ago).  Discussed relation to stress/etc  Rare PVD photopsia for years--discussed S+S of RD    PLAN:      Rtc 1 yr, or sooner if inc F/F, or migs inc in frequency/severity/duration

## 2024-12-23 ENCOUNTER — TELEPHONE (OUTPATIENT)
Dept: FAMILY MEDICINE | Facility: CLINIC | Age: 44
End: 2024-12-23
Payer: COMMERCIAL

## 2024-12-23 DIAGNOSIS — Z91.09 ENVIRONMENTAL ALLERGIES: ICD-10-CM

## 2024-12-23 DIAGNOSIS — B96.89 ACUTE BACTERIAL SINUSITIS: Primary | ICD-10-CM

## 2024-12-23 DIAGNOSIS — J01.90 ACUTE BACTERIAL SINUSITIS: Primary | ICD-10-CM

## 2024-12-23 RX ORDER — AZELASTINE 1 MG/ML
2 SPRAY, METERED NASAL 2 TIMES DAILY
Qty: 30 ML | Refills: 3 | Status: SHIPPED | OUTPATIENT
Start: 2024-12-23

## 2024-12-23 RX ORDER — FLUCONAZOLE 150 MG/1
150 TABLET ORAL DAILY
Qty: 1 TABLET | Refills: 0 | Status: SHIPPED | OUTPATIENT
Start: 2024-12-23 | End: 2024-12-24

## 2024-12-23 RX ORDER — IPRATROPIUM BROMIDE 21 UG/1
1 SPRAY, METERED NASAL 2 TIMES DAILY
Qty: 90 ML | Refills: 11 | Status: SHIPPED | OUTPATIENT
Start: 2024-12-23

## 2024-12-23 RX ORDER — BENZONATATE 100 MG/1
100 CAPSULE ORAL 3 TIMES DAILY PRN
Qty: 30 CAPSULE | Refills: 0 | Status: SHIPPED | OUTPATIENT
Start: 2024-12-23 | End: 2025-01-02

## 2024-12-23 RX ORDER — AZITHROMYCIN 250 MG/1
TABLET, FILM COATED ORAL
Qty: 6 TABLET | Refills: 0 | Status: SHIPPED | OUTPATIENT
Start: 2024-12-23 | End: 2024-12-28

## 2024-12-23 RX ORDER — FLUTICASONE PROPIONATE 50 MCG
2 SPRAY, SUSPENSION (ML) NASAL DAILY
Qty: 16 G | Refills: 12 | Status: SHIPPED | OUTPATIENT
Start: 2024-12-23 | End: 2025-01-22

## 2024-12-27 ENCOUNTER — PATIENT MESSAGE (OUTPATIENT)
Dept: OPTOMETRY | Facility: CLINIC | Age: 44
End: 2024-12-27
Payer: COMMERCIAL

## 2025-02-19 ENCOUNTER — RESULTS FOLLOW-UP (OUTPATIENT)
Dept: FAMILY MEDICINE | Facility: CLINIC | Age: 45
End: 2025-02-19

## 2025-02-19 ENCOUNTER — HOSPITAL ENCOUNTER (OUTPATIENT)
Dept: RADIOLOGY | Facility: HOSPITAL | Age: 45
Discharge: HOME | End: 2025-02-19
Attending: FAMILY MEDICINE
Payer: COMMERCIAL

## 2025-02-19 ENCOUNTER — TELEPHONE (OUTPATIENT)
Dept: FAMILY MEDICINE | Facility: CLINIC | Age: 45
End: 2025-02-19
Payer: COMMERCIAL

## 2025-02-19 DIAGNOSIS — M79.671 RIGHT FOOT PAIN: Primary | ICD-10-CM

## 2025-02-19 DIAGNOSIS — M79.671 RIGHT FOOT PAIN: ICD-10-CM

## 2025-02-19 DIAGNOSIS — F41.9 ANXIETY: ICD-10-CM

## 2025-02-19 PROCEDURE — 73630 X-RAY EXAM OF FOOT: CPT | Mod: TC,FY,PO,RT

## 2025-02-19 RX ORDER — BUSPIRONE HYDROCHLORIDE 7.5 MG/1
7.5 TABLET ORAL 2 TIMES DAILY
Qty: 180 TABLET | Refills: 3 | Status: SHIPPED | OUTPATIENT
Start: 2025-02-19 | End: 2026-02-19

## 2025-02-19 NOTE — TELEPHONE ENCOUNTER
No care due was identified.  [facility] Embedded Care Due Messages. Reference number: [id number].   2/19/2025 11:36:29 AM CST

## 2025-03-12 ENCOUNTER — RESULTS FOLLOW-UP (OUTPATIENT)
Dept: FAMILY MEDICINE | Facility: CLINIC | Age: 45
End: 2025-03-12

## 2025-03-12 ENCOUNTER — LAB VISIT (OUTPATIENT)
Dept: LAB | Facility: HOSPITAL | Age: 45
End: 2025-03-12
Attending: FAMILY MEDICINE
Payer: COMMERCIAL

## 2025-03-12 DIAGNOSIS — R17 HIGH BILIRUBIN: ICD-10-CM

## 2025-03-12 LAB
ALBUMIN SERPL BCP-MCNC: 3.9 G/DL (ref 3.5–5.2)
ALP SERPL-CCNC: 43 U/L (ref 40–150)
ALT SERPL W/O P-5'-P-CCNC: 8 U/L (ref 10–44)
ANION GAP SERPL CALC-SCNC: 4 MMOL/L (ref 8–16)
AST SERPL-CCNC: 14 U/L (ref 10–40)
BILIRUB SERPL-MCNC: 0.9 MG/DL (ref 0.1–1)
BUN SERPL-MCNC: 12 MG/DL (ref 6–20)
CALCIUM SERPL-MCNC: 8.7 MG/DL (ref 8.7–10.5)
CHLORIDE SERPL-SCNC: 107 MMOL/L (ref 95–110)
CO2 SERPL-SCNC: 27 MMOL/L (ref 23–29)
CREAT SERPL-MCNC: 0.7 MG/DL (ref 0.5–1.4)
EST. GFR  (NO RACE VARIABLE): >60 ML/MIN/1.73 M^2
GLUCOSE SERPL-MCNC: 102 MG/DL (ref 70–110)
POTASSIUM SERPL-SCNC: 4.3 MMOL/L (ref 3.5–5.1)
PROT SERPL-MCNC: 6.7 G/DL (ref 6–8.4)
SODIUM SERPL-SCNC: 138 MMOL/L (ref 136–145)

## 2025-03-12 PROCEDURE — 36415 COLL VENOUS BLD VENIPUNCTURE: CPT | Mod: PO | Performed by: FAMILY MEDICINE

## 2025-03-12 PROCEDURE — 80053 COMPREHEN METABOLIC PANEL: CPT | Performed by: FAMILY MEDICINE

## 2025-05-12 ENCOUNTER — OFFICE VISIT (OUTPATIENT)
Dept: FAMILY MEDICINE | Facility: CLINIC | Age: 45
End: 2025-05-12
Payer: COMMERCIAL

## 2025-05-12 VITALS
HEART RATE: 65 BPM | BODY MASS INDEX: 24.17 KG/M2 | HEIGHT: 67 IN | DIASTOLIC BLOOD PRESSURE: 92 MMHG | WEIGHT: 154 LBS | SYSTOLIC BLOOD PRESSURE: 144 MMHG | OXYGEN SATURATION: 99 %

## 2025-05-12 DIAGNOSIS — R22.2 SUBCUTANEOUS MASS OF BACK: Primary | ICD-10-CM

## 2025-05-12 PROCEDURE — 99999 PR PBB SHADOW E&M-EST. PATIENT-LVL III: CPT | Mod: PBBFAC,,, | Performed by: STUDENT IN AN ORGANIZED HEALTH CARE EDUCATION/TRAINING PROGRAM

## 2025-05-12 PROCEDURE — 88304 TISSUE EXAM BY PATHOLOGIST: CPT | Mod: TC | Performed by: STUDENT IN AN ORGANIZED HEALTH CARE EDUCATION/TRAINING PROGRAM

## 2025-05-12 NOTE — PROGRESS NOTES
Excision of Lesion    Date/Time: 5/12/2025 3:00 PM    Performed by: Panchito Willett DO  Authorized by: Panchito Willett, DO    Consent Done?:  Yes (Written)  Timeout: prior to procedure the correct patient, procedure, and site was verified    Local anesthesia used?: Yes    Anesthesia:  Local infiltration  Local anesthetic:  Lidocaine 1% with epinephrine  Anesthetic total (ml):  2.5  Assistants?: No    Indications:  Cyst  Body area:  Shoulder  Laterality:  Left  Position:  Prone  Anesthesia:  Local infiltration  Local anesthetic:  Lidocaine 1% with epinephrine  Excision type:  Tumor  Excision depth:  Subcutaneous  Excision size (cm):  1  Scalpel size:  10  Incision type:  Elliptical  Specimens?: Yes     Specimens submitted to pathology.   Hemostasis was obtained.  Estimated blood loss (cc):  2  Wound closure:  Simple  Wound repair size (cm):  2  Sutures:  4-0 Monocryl  Sterile dressings:  Telfa gauze  Post-op diagnosis:  Same as pre-op diagnosis.   Needle, instrument, and sponge counts were correct.   Patient tolerated the procedure well with no immediate complications.   Post-operative instructions were provided for the patient.   Patient was discharged and will follow up for wound check.  Comments:      Lidocaine LOT:[id number]  EXP: 01/2027    Panchito Willett DO  Family Medicine

## 2025-05-15 LAB
ESTROGEN SERPL-MCNC: NORMAL PG/ML
INSULIN SERPL-ACNC: NORMAL U[IU]/ML
LAB AP CLINICAL INFORMATION: NORMAL
LAB AP GROSS DESCRIPTION: NORMAL
LAB AP PERFORMING LOCATION(S): NORMAL
LAB AP REPORT FOOTNOTES: NORMAL
T3RU NFR SERPL: NORMAL %

## 2025-05-16 ENCOUNTER — RESULTS FOLLOW-UP (OUTPATIENT)
Dept: FAMILY MEDICINE | Facility: CLINIC | Age: 45
End: 2025-05-16

## 2025-05-19 ENCOUNTER — OFFICE VISIT (OUTPATIENT)
Dept: FAMILY MEDICINE | Facility: CLINIC | Age: 45
End: 2025-05-19
Payer: COMMERCIAL

## 2025-05-19 VITALS
HEIGHT: 67 IN | BODY MASS INDEX: 24.01 KG/M2 | SYSTOLIC BLOOD PRESSURE: 138 MMHG | DIASTOLIC BLOOD PRESSURE: 84 MMHG | WEIGHT: 153 LBS | HEART RATE: 69 BPM | OXYGEN SATURATION: 99 %

## 2025-05-19 DIAGNOSIS — Z48.02 VISIT FOR SUTURE REMOVAL: Primary | ICD-10-CM

## 2025-05-19 DIAGNOSIS — R22.2 SUBCUTANEOUS MASS OF BACK: ICD-10-CM

## 2025-05-19 PROCEDURE — 99499 UNLISTED E&M SERVICE: CPT | Mod: S$GLB,,, | Performed by: STUDENT IN AN ORGANIZED HEALTH CARE EDUCATION/TRAINING PROGRAM

## 2025-05-19 PROCEDURE — 99999 PR PBB SHADOW E&M-EST. PATIENT-LVL III: CPT | Mod: PBBFAC,,, | Performed by: STUDENT IN AN ORGANIZED HEALTH CARE EDUCATION/TRAINING PROGRAM

## 2025-05-19 PROCEDURE — 99024 POSTOP FOLLOW-UP VISIT: CPT | Mod: S$GLB,,, | Performed by: STUDENT IN AN ORGANIZED HEALTH CARE EDUCATION/TRAINING PROGRAM

## 2025-05-19 NOTE — PROGRESS NOTES
Suture Removal    Date/Time: 5/19/2025 3:40 PM  Location procedure was performed: [facility]    Performed by: Panchito Willett DO  Authorized by: Panchito Willett DO  Body area: trunk  Location details: back  Description of findings: 5x sutures on L upper back removed   Wound Appearance: clean, well healed, normal color and no drainage  Sutures Removed: 5  Staples Removed: 0  Post-removal: no dressing applied  Facility: sutures placed in this facility  Complications: No  Estimated blood loss (mL): 0  Specimens: No  Implants: No  Patient tolerance: Patient tolerated the procedure well with no immediate complications      Panchito Willett DO  Family Medicine

## 2025-07-30 ENCOUNTER — TELEPHONE (OUTPATIENT)
Dept: FAMILY MEDICINE | Facility: CLINIC | Age: 45
End: 2025-07-30
Payer: COMMERCIAL

## 2025-07-30 DIAGNOSIS — H01.002 BLEPHARITIS OF RIGHT LOWER EYELID, UNSPECIFIED TYPE: ICD-10-CM

## 2025-07-30 DIAGNOSIS — Z12.31 ENCOUNTER FOR SCREENING MAMMOGRAM FOR MALIGNANT NEOPLASM OF BREAST: Primary | ICD-10-CM

## 2025-07-30 RX ORDER — ERYTHROMYCIN 5 MG/G
OINTMENT OPHTHALMIC 3 TIMES DAILY
Qty: 3.5 G | Refills: 0 | Status: SHIPPED | OUTPATIENT
Start: 2025-07-30

## 2025-07-30 RX ORDER — DOXYCYCLINE HYCLATE 100 MG
100 TABLET ORAL 2 TIMES DAILY
Qty: 14 TABLET | Refills: 0 | Status: SHIPPED | OUTPATIENT
Start: 2025-07-30 | End: 2025-08-06

## 2025-08-02 ENCOUNTER — HOSPITAL ENCOUNTER (OUTPATIENT)
Dept: RADIOLOGY | Facility: HOSPITAL | Age: 45
Discharge: HOME | End: 2025-08-02
Attending: FAMILY MEDICINE
Payer: COMMERCIAL

## 2025-08-02 DIAGNOSIS — Z12.31 ENCOUNTER FOR SCREENING MAMMOGRAM FOR MALIGNANT NEOPLASM OF BREAST: ICD-10-CM

## 2025-08-02 PROCEDURE — 77067 SCR MAMMO BI INCL CAD: CPT | Mod: 26,,, | Performed by: RADIOLOGY

## 2025-08-02 PROCEDURE — 77063 BREAST TOMOSYNTHESIS BI: CPT | Mod: 26,,, | Performed by: RADIOLOGY

## 2025-08-02 PROCEDURE — 77063 BREAST TOMOSYNTHESIS BI: CPT | Mod: TC

## 2025-08-25 ENCOUNTER — PATIENT MESSAGE (OUTPATIENT)
Dept: FAMILY MEDICINE | Facility: CLINIC | Age: 45
End: 2025-08-25
Payer: COMMERCIAL

## 2025-08-25 DIAGNOSIS — F41.9 ANXIETY: Primary | ICD-10-CM

## 2025-08-25 RX ORDER — BUSPIRONE HYDROCHLORIDE 10 MG/1
10 TABLET ORAL NIGHTLY
Qty: 90 TABLET | Refills: 0 | Status: SHIPPED | OUTPATIENT
Start: 2025-08-25 | End: 2026-08-25

## 2025-08-25 RX ORDER — BUSPIRONE HYDROCHLORIDE 7.5 MG/1
7.5 TABLET ORAL EVERY MORNING
Qty: 90 TABLET | Refills: 3 | Status: SHIPPED | OUTPATIENT
Start: 2025-08-25 | End: 2026-08-25